# Patient Record
Sex: MALE | Race: WHITE | NOT HISPANIC OR LATINO | Employment: OTHER | URBAN - NONMETROPOLITAN AREA
[De-identification: names, ages, dates, MRNs, and addresses within clinical notes are randomized per-mention and may not be internally consistent; named-entity substitution may affect disease eponyms.]

---

## 2017-01-10 ENCOUNTER — OFFICE VISIT (OUTPATIENT)
Dept: FAMILY MEDICINE CLINIC | Facility: CLINIC | Age: 37
End: 2017-01-10

## 2017-01-10 VITALS
HEIGHT: 67 IN | DIASTOLIC BLOOD PRESSURE: 82 MMHG | BODY MASS INDEX: 37.51 KG/M2 | OXYGEN SATURATION: 97 % | HEART RATE: 91 BPM | SYSTOLIC BLOOD PRESSURE: 140 MMHG | WEIGHT: 239 LBS

## 2017-01-10 DIAGNOSIS — B35.6 JOCK ITCH: ICD-10-CM

## 2017-01-10 DIAGNOSIS — F20.9 SCHIZOPHRENIA, UNSPECIFIED TYPE (HCC): Primary | ICD-10-CM

## 2017-01-10 DIAGNOSIS — F32.A CHRONIC DEPRESSION: ICD-10-CM

## 2017-01-10 DIAGNOSIS — I10 ESSENTIAL HYPERTENSION: ICD-10-CM

## 2017-01-10 PROCEDURE — 99214 OFFICE O/P EST MOD 30 MIN: CPT | Performed by: FAMILY MEDICINE

## 2017-01-10 RX ORDER — CLOTRIMAZOLE 1 G/ML
SOLUTION TOPICAL 2 TIMES DAILY
Qty: 60 ML | Refills: 2 | Status: SHIPPED | OUTPATIENT
Start: 2017-01-10 | End: 2017-07-13

## 2017-01-10 NOTE — MR AVS SNAPSHOT
Moshe Duane Nuha   1/10/2017 1:30 PM   Office Visit    Dept Phone:  [phone]   Encounter #:  [id number]    Provider:  Calos Greenberg MD   Department:  [facility] FAMILY MEDICINE                Your Full Care Plan              Today's Medication Changes          These changes are accurate as of: 1/10/17  3:06 PM.  If you have any questions, ask your nurse or doctor.               New Medication(s)Ordered:     clotrimazole 1 % external solution   Commonly known as:  LOTRIMIN   Apply  topically 2 (Two) Times a Day.   Started by:  Calos Greenberg MD            Where to Get Your Medications      These medications were sent to [facility] [address] [phone] [facility] [phone] [address] [address]     Phone:  [phone]     clotrimazole 1 % external solution                  Your Updated Medication List          This list is accurate as of: 1/10/17  3:06 PM.  Always use your most recent med list.                amLODIPine 10 MG tablet   Commonly known as:  NORVASC   TAKE ONE TABLET BY MOUTH EVERY DAY       CELEXA 20 MG tablet   Generic drug:  citalopram       clotrimazole 1 % external solution   Commonly known as:  LOTRIMIN   Apply  topically 2 (Two) Times a Day.       haloperidol 2 MG tablet   Commonly known as:  HALDOL       hydrOXYzine 25 MG tablet   Commonly known as:  ATARAX       KEPPRA 500 MG tablet   Generic drug:  levETIRAcetam       metoprolol tartrate 50 MG tablet   Commonly known as:  LOPRESSOR   TAKE ONE TABLET BY MOUTH TWO TIMES A DAY       nystatin 018896 UNIT/GM ointment   Commonly known as:  MYCOSTATIN       primidone 50 MG tablet   Commonly known as:  MYSOLINE       risperiDONE 4 MG tablet   Commonly known as:  risperDAL       TEGRETOL- MG 12 hr tablet   Generic drug:  carBAMazepine XR       traZODone 50 MG tablet   Commonly known as:  DESYREL               We Performed the  Following     CBC & Differential     Comprehensive Metabolic Panel     Lipid Panel       You Were Diagnosed With        Codes Comments    Schizophrenia, unspecified type    -  Primary ICD-10-CM: F20.9     Chronic depression     ICD-10-CM: F32.9  ICD-9-CM: 311     Essential hypertension     ICD-10-CM: I10  ICD-9-CM: 401.9     Jock itch     ICD-10-CM: L29.8  ICD-9-CM: 698.9       Instructions    Return in 3 months.  Will contact with labs.     Patient Instructions History      Upcoming Appointments     Visit Type Date Time Department    OFFICE VISIT 1/10/2017  1:30 PM [facility] FM FACULTY [facility]    FOLLOW UP 6/8/2017 10:30 AM [facility] SLEEP CENTER [facility]    OFFICE VISIT 8/28/2017  1:15 PM [facility] CARDIOLOGY [facility]      [facility] Signup     Our records indicate that you have an active [employer] account.    You can view your After Visit Summary by going to [employer] and logging in with your [employer] username and password.  If you don't have a [employer] username and password but a parent or guardian has access to your record, the parent or guardian should login with their own [employer] username and password and access your record to view the After Visit Summary.    If you have questions, you can email [email or web address] or call [phone] to talk to our [employer] staff.  Remember, [employer] is NOT to be used for urgent needs.  For medical emergencies, dial 911.               Other Info from Your Visit           Your Appointments     Jun 08, 2017 10:30 AM CDT   Follow Up with SLEEP CLINIC [facility] (--)    [address] [place] [address] [place] [address] [phone]           Arrive 15 minutes prior to appointment.            Aug 28, 2017  1:15 PM CDT   Office Visit with Nick Lao MD PhD   [facility] CARDIOLOGY (--)    [address] [place] [address] [address]  [facility] [address] [place] [id number] [phone]           Arrive 15 minutes prior to appointment.             "  Allergies     Abilify [Aripiprazole]  Confusion, Hallucinations    Depakote [Valproic Acid]  Other (See Comments)    Other reaction(s): TREMORS    Lexapro [Escitalopram]  Other (See Comments)    Other reaction(s): JERKING    Mirtazapine  Other (See Comments)    Other reaction(s): JERKING IN SLEEP    Phenobarbital  Confusion, Hallucinations      Reason for Visit     Insomnia     Hypertension     Depression           Vital Signs     Blood Pressure Pulse Height Weight Oxygen Saturation Body Mass Index    140/82 (BP Location: Right arm, Patient Position: Sitting, Cuff Size: Adult) 91 67\" (170.2 cm) 239 lb (108 kg) 97% 37.43 kg/m2    Smoking Status                   Never Smoker           Problems and Diagnoses Noted     Mood problem    High blood pressure    Mental health problem    Jock itch            "

## 2017-01-10 NOTE — PROGRESS NOTES
Subjective   Nicholas Duane Stovall is a 36 y.o. male.     History of Present Illness The patient presents for check of his chronic medical issues. He is doing well. He is having itching in the genital area.    The following portions of the patient's history were reviewed and updated as appropriate: allergies, current medications, past family history, past medical history, past social history, past surgical history and problem list.    Review of Systems   Constitutional: Negative for fatigue and fever.   Respiratory: Negative for cough, chest tightness and wheezing.    Cardiovascular: Negative for chest pain, palpitations and leg swelling.       Objective   Physical Exam   Constitutional: He is oriented to person, place, and time. He appears well-developed and well-nourished.   HENT:   Head: Normocephalic and atraumatic.   Right Ear: External ear normal.   Left Ear: External ear normal.   Nose: Nose normal.   Mouth/Throat: Oropharynx is clear and moist.   Eyes: EOM are normal. Pupils are equal, round, and reactive to light.   Neck: Normal range of motion.   Cardiovascular: Normal rate, regular rhythm and normal heart sounds.  Exam reveals no gallop and no friction rub.    No murmur heard.  Pulmonary/Chest: Effort normal and breath sounds normal. No respiratory distress. He has no wheezes. He has no rales.   Abdominal: Soft. Bowel sounds are normal. There is no tenderness.   Musculoskeletal: Normal range of motion.   Neurological: He is alert and oriented to person, place, and time.   Skin: Skin is warm and dry.   Some erythema in the creases around the groin.   Psychiatric: He has a normal mood and affect. His behavior is normal. Thought content normal.   Nursing note and vitals reviewed.      Assessment/Plan   Moshe was seen today for insomnia, hypertension and depression.    Diagnoses and all orders for this visit:    Schizophrenia, unspecified type    Chronic depression    Essential hypertension  -     CBC &  Differential  -     Comprehensive Metabolic Panel  -     Lipid Panel    Jock itch    Other orders  -     clotrimazole (LOTRIMIN) 1 % external solution; Apply  topically 2 (Two) Times a Day.          Return in three months.  Will contact with labs.

## 2017-01-13 LAB
ALBUMIN SERPL-MCNC: 3.8 GM/DL (ref 3.4–4.8)
ALP SERPL-CCNC: 86 U/L (ref 38–126)
ALT SERPL-CCNC: 54 U/L (ref 21–72)
ANION GAP SERPL CALCULATED.3IONS-SCNC: 8 MMOL/L (ref 5–15)
AST SERPL-CCNC: 45 U/L (ref 17–59)
BASOPHILS # BLD AUTO: 0.13 X1000/UL (ref 0–0.2)
BASOPHILS NFR BLD AUTO: 2 % (ref 0–2)
BILIRUB SERPL-MCNC: 0.5 MG/DL (ref 0.2–1.3)
BUN SERPL-MCNC: 8 MG/DL (ref 7–21)
CALCIUM SERPL-MCNC: 8.8 MG/DL (ref 8.4–10.2)
CHLORIDE SERPL-SCNC: 103 MMOL/L (ref 95–110)
CHOLEST SERPL-MCNC: 169 MG/DL (ref 0–199)
CO2 SERPL-SCNC: 28 MMOL/L (ref 22–31)
CONV AUTO IG#: 0.03 X1000/UL (ref 0.01–0.02)
CONV AUTO IG%: 0.5 % (ref 0–0.5)
CREAT SERPL-MCNC: 0.8 MG/DL (ref 0.7–1.3)
EOSINOPHIL # BLD AUTO: 0.13 X1000/UL (ref 0–0.7)
EOSINOPHIL NFR BLD AUTO: 2 % (ref 0–7)
ERYTHROCYTE [DISTWIDTH] IN BLOOD: 13.5 % (ref 11.5–14.5)
GLUCOSE SERPL-MCNC: 83 MG/DL (ref 60–100)
GRANULOCYTES # BLD AUTO: 2.63 X1000/UL (ref 2–8.6)
GRANULOCYTES NFR BLD AUTO: 40.9 % (ref 37–80)
HCT VFR BLD CALC: 38.2 % (ref 39–49)
HDLC SERPL-MCNC: 30 MG/DL (ref 60–200)
HGB BLD-MCNC: 13.3 GM/DL (ref 13.7–17.3)
LDLC SERPL CALC-MCNC: 105 MG/DL (ref 0–129)
LYMPHOCYTES # BLD AUTO: 2.97 X1000/UL (ref 0.6–4.2)
LYMPHOCYTES NFR BLD AUTO: 46.3 % (ref 10–50)
MCH RBC QN: 28.2 PG (ref 26–34)
MCHC RBC-ENTMCNC: 34.8 GM/DL (ref 31.5–36.3)
MCV RBC: 80.9 FL (ref 80–98)
MONOCYTES # BLD AUTO: 0.53 X1000/UL (ref 0–0.9)
MONOCYTES NFR BLD AUTO: 8.3 % (ref 0–12)
NRBC BLD AUTO-RTO: 0 % (ref 0–0.2)
NRBC SPEC MANUAL: 0 X1000/UL
PLATELET # BLD: 168 X1000/MM3 (ref 150–450)
PMV BLD: 8.6 FL (ref 8–12)
POTASSIUM SERPL-SCNC: 4.3 MMOL/L (ref 3.5–5.1)
PROT SERPL-MCNC: 6.4 GM/DL (ref 6.3–8.6)
RBC # BLD: 4.72 MEGA/MM3 (ref 4.37–5.74)
SODIUM SERPL-SCNC: 139 MMOL/L (ref 137–145)
TRIGL SERPL-MCNC: 172 MG/DL (ref 20–199)
WBC # BLD: 6.4 X1000/UL (ref 3.2–9.8)

## 2017-02-23 RX ORDER — METOPROLOL TARTRATE 50 MG/1
TABLET, FILM COATED ORAL
Qty: 60 TABLET | Refills: 5 | Status: SHIPPED | OUTPATIENT
Start: 2017-02-23 | End: 2017-09-18 | Stop reason: SDUPTHER

## 2017-04-11 ENCOUNTER — OFFICE VISIT (OUTPATIENT)
Dept: FAMILY MEDICINE CLINIC | Facility: CLINIC | Age: 37
End: 2017-04-11

## 2017-04-11 ENCOUNTER — APPOINTMENT (OUTPATIENT)
Dept: LAB | Facility: HOSPITAL | Age: 37
End: 2017-04-11

## 2017-04-11 VITALS
SYSTOLIC BLOOD PRESSURE: 102 MMHG | HEIGHT: 67 IN | DIASTOLIC BLOOD PRESSURE: 70 MMHG | WEIGHT: 245 LBS | HEART RATE: 72 BPM | BODY MASS INDEX: 38.45 KG/M2 | OXYGEN SATURATION: 96 %

## 2017-04-11 DIAGNOSIS — I10 ESSENTIAL HYPERTENSION: ICD-10-CM

## 2017-04-11 DIAGNOSIS — F20.9 SCHIZOPHRENIA, UNSPECIFIED TYPE (HCC): Primary | ICD-10-CM

## 2017-04-11 DIAGNOSIS — G40.909 NONINTRACTABLE EPILEPSY WITHOUT STATUS EPILEPTICUS, UNSPECIFIED EPILEPSY TYPE (HCC): ICD-10-CM

## 2017-04-11 LAB
ALBUMIN SERPL-MCNC: 4.8 G/DL (ref 3.4–4.8)
ALBUMIN/GLOB SERPL: 2.3 G/DL (ref 1.1–1.8)
ALP SERPL-CCNC: 89 U/L (ref 38–126)
ALT SERPL W P-5'-P-CCNC: 68 U/L (ref 21–72)
ANION GAP SERPL CALCULATED.3IONS-SCNC: 11 MMOL/L (ref 5–15)
AST SERPL-CCNC: 77 U/L (ref 17–59)
BASOPHILS # BLD AUTO: 0.18 10*3/MM3 (ref 0–0.2)
BASOPHILS NFR BLD AUTO: 3.7 % (ref 0–2)
BILIRUB SERPL-MCNC: 0.9 MG/DL (ref 0.2–1.3)
BUN BLD-MCNC: 14 MG/DL (ref 7–21)
BUN/CREAT SERPL: 15.6 (ref 7–25)
CALCIUM SPEC-SCNC: 9 MG/DL (ref 8.4–10.2)
CHLORIDE SERPL-SCNC: 103 MMOL/L (ref 95–110)
CO2 SERPL-SCNC: 25 MMOL/L (ref 22–31)
CREAT BLD-MCNC: 0.9 MG/DL (ref 0.7–1.3)
DEPRECATED RDW RBC AUTO: 51.4 FL (ref 35.1–43.9)
EOSINOPHIL # BLD AUTO: 0.07 10*3/MM3 (ref 0–0.7)
EOSINOPHIL NFR BLD AUTO: 1.4 % (ref 0–7)
ERYTHROCYTE [DISTWIDTH] IN BLOOD BY AUTOMATED COUNT: 16.5 % (ref 11.5–14.5)
GFR SERPL CREATININE-BSD FRML MDRD: 94 ML/MIN/1.73 (ref 70–162)
GLOBULIN UR ELPH-MCNC: 2.1 GM/DL (ref 2.3–3.5)
GLUCOSE BLD-MCNC: 98 MG/DL (ref 60–100)
HCT VFR BLD AUTO: 29.7 % (ref 39–49)
HGB BLD-MCNC: 10.7 G/DL (ref 13.7–17.3)
IMM GRANULOCYTES # BLD: 0.03 10*3/MM3 (ref 0–0.02)
IMM GRANULOCYTES NFR BLD: 0.6 % (ref 0–0.5)
LYMPHOCYTES # BLD AUTO: 2.14 10*3/MM3 (ref 0.6–4.2)
LYMPHOCYTES NFR BLD AUTO: 43.8 % (ref 10–50)
MCH RBC QN AUTO: 31.5 PG (ref 26.5–34)
MCHC RBC AUTO-ENTMCNC: 36 G/DL (ref 31.5–36.3)
MCV RBC AUTO: 87.4 FL (ref 80–98)
MONOCYTES # BLD AUTO: 0.53 10*3/MM3 (ref 0–0.9)
MONOCYTES NFR BLD AUTO: 10.8 % (ref 0–12)
NEUTROPHILS # BLD AUTO: 1.94 10*3/MM3 (ref 2–8.6)
NEUTROPHILS NFR BLD AUTO: 39.7 % (ref 37–80)
PLATELET # BLD AUTO: 195 10*3/MM3 (ref 150–450)
PMV BLD AUTO: 8.4 FL (ref 8–12)
POTASSIUM BLD-SCNC: 4.1 MMOL/L (ref 3.5–5.1)
PROT SERPL-MCNC: 6.9 G/DL (ref 6.3–8.6)
RBC # BLD AUTO: 3.4 10*6/MM3 (ref 4.37–5.74)
SODIUM BLD-SCNC: 139 MMOL/L (ref 137–145)
WBC NRBC COR # BLD: 4.89 10*3/MM3 (ref 3.2–9.8)

## 2017-04-11 PROCEDURE — 85025 COMPLETE CBC W/AUTO DIFF WBC: CPT | Performed by: FAMILY MEDICINE

## 2017-04-11 PROCEDURE — 80053 COMPREHEN METABOLIC PANEL: CPT | Performed by: FAMILY MEDICINE

## 2017-04-11 PROCEDURE — 36415 COLL VENOUS BLD VENIPUNCTURE: CPT | Performed by: FAMILY MEDICINE

## 2017-04-11 PROCEDURE — 99213 OFFICE O/P EST LOW 20 MIN: CPT | Performed by: FAMILY MEDICINE

## 2017-04-11 NOTE — PROGRESS NOTES
Subjective   Nicholas Duane Stovall is a 37 y.o. male.     History of Present Illness   Patient comes in for check of his chronic medical issues.  He is at his baseline.He has no major issues today.    The following portions of the patient's history were reviewed and updated as appropriate: allergies, current medications, past family history, past medical history, past social history, past surgical history and problem list.    Review of Systems   Constitutional: Negative for fatigue and fever.   Respiratory: Negative for cough, chest tightness and wheezing.    Cardiovascular: Negative for chest pain, palpitations and leg swelling.   Neurological: Negative for dizziness.   Psychiatric/Behavioral: Negative for agitation, behavioral problems and confusion.       Objective   Physical Exam   Constitutional: He appears well-developed and well-nourished.   HENT:   Head: Normocephalic and atraumatic.   Right Ear: External ear normal.   Left Ear: External ear normal.   Nose: Nose normal.   Mouth/Throat: Oropharynx is clear and moist.   Eyes: EOM are normal. Pupils are equal, round, and reactive to light.   Cardiovascular: Normal rate, regular rhythm and normal heart sounds.  Exam reveals no gallop and no friction rub.    No murmur heard.  Pulmonary/Chest: Effort normal and breath sounds normal. No respiratory distress. He has no wheezes. He has no rales.   Abdominal: Soft. Bowel sounds are normal. He exhibits no distension. There is no tenderness.   Skin: Skin is warm and dry.   Vitals reviewed.      Assessment/Plan   Moshe was seen today for hypertension, schizophrenia and follow-up.    Diagnoses and all orders for this visit:    Schizophrenia, unspecified type    Nonintractable epilepsy without status epilepticus, unspecified epilepsy type    Essential hypertension  -     CBC & Differential  -     Comprehensive Metabolic Panel      Will contact with labs.  Return to clinic in 3 months.

## 2017-04-21 DIAGNOSIS — D50.9 IRON DEFICIENCY ANEMIA, UNSPECIFIED IRON DEFICIENCY ANEMIA TYPE: Primary | ICD-10-CM

## 2017-05-03 ENCOUNTER — LAB (OUTPATIENT)
Dept: LAB | Facility: HOSPITAL | Age: 37
End: 2017-05-03

## 2017-05-03 DIAGNOSIS — D50.9 IRON DEFICIENCY ANEMIA, UNSPECIFIED IRON DEFICIENCY ANEMIA TYPE: ICD-10-CM

## 2017-05-03 LAB — HEMOCCULT STL QL IA: NEGATIVE

## 2017-05-03 PROCEDURE — 82274 ASSAY TEST FOR BLOOD FECAL: CPT | Performed by: FAMILY MEDICINE

## 2017-06-19 RX ORDER — AMLODIPINE BESYLATE 10 MG/1
TABLET ORAL
Qty: 30 TABLET | Refills: 5 | Status: SHIPPED | OUTPATIENT
Start: 2017-06-19 | End: 2017-12-11 | Stop reason: SDUPTHER

## 2017-07-10 ENCOUNTER — OFFICE VISIT (OUTPATIENT)
Dept: FAMILY MEDICINE CLINIC | Facility: CLINIC | Age: 37
End: 2017-07-10

## 2017-07-10 ENCOUNTER — LAB (OUTPATIENT)
Dept: LAB | Facility: HOSPITAL | Age: 37
End: 2017-07-10

## 2017-07-10 VITALS
DIASTOLIC BLOOD PRESSURE: 88 MMHG | BODY MASS INDEX: 37.35 KG/M2 | HEIGHT: 67 IN | HEART RATE: 68 BPM | WEIGHT: 238 LBS | OXYGEN SATURATION: 98 % | SYSTOLIC BLOOD PRESSURE: 120 MMHG

## 2017-07-10 DIAGNOSIS — I10 ESSENTIAL HYPERTENSION: Primary | ICD-10-CM

## 2017-07-10 DIAGNOSIS — G40.909 NONINTRACTABLE EPILEPSY WITHOUT STATUS EPILEPTICUS, UNSPECIFIED EPILEPSY TYPE (HCC): ICD-10-CM

## 2017-07-10 DIAGNOSIS — F20.9 SCHIZOPHRENIA, UNSPECIFIED TYPE (HCC): ICD-10-CM

## 2017-07-10 DIAGNOSIS — T63.464A WASP STING, UNDETERMINED INTENT, INITIAL ENCOUNTER: ICD-10-CM

## 2017-07-10 LAB
ALBUMIN SERPL-MCNC: 4.6 G/DL (ref 3.4–4.8)
ALBUMIN/GLOB SERPL: 2.1 G/DL (ref 1.1–1.8)
ALP SERPL-CCNC: 93 U/L (ref 38–126)
ALT SERPL W P-5'-P-CCNC: 70 U/L (ref 21–72)
ANION GAP SERPL CALCULATED.3IONS-SCNC: 12 MMOL/L (ref 5–15)
AST SERPL-CCNC: 60 U/L (ref 17–59)
BASOPHILS # BLD AUTO: 0.08 10*3/MM3 (ref 0–0.2)
BASOPHILS NFR BLD AUTO: 1.7 % (ref 0–2)
BILIRUB SERPL-MCNC: 0.3 MG/DL (ref 0.2–1.3)
BUN BLD-MCNC: 11 MG/DL (ref 7–21)
BUN/CREAT SERPL: 13.3 (ref 7–25)
CALCIUM SPEC-SCNC: 8.7 MG/DL (ref 8.4–10.2)
CHLORIDE SERPL-SCNC: 102 MMOL/L (ref 95–110)
CO2 SERPL-SCNC: 26 MMOL/L (ref 22–31)
CREAT BLD-MCNC: 0.83 MG/DL (ref 0.7–1.3)
DEPRECATED RDW RBC AUTO: 38.4 FL (ref 35.1–43.9)
EOSINOPHIL # BLD AUTO: 0.01 10*3/MM3 (ref 0–0.7)
EOSINOPHIL NFR BLD AUTO: 0.2 % (ref 0–7)
ERYTHROCYTE [DISTWIDTH] IN BLOOD BY AUTOMATED COUNT: 13 % (ref 11.5–14.5)
GFR SERPL CREATININE-BSD FRML MDRD: 103 ML/MIN/1.73 (ref 70–162)
GLOBULIN UR ELPH-MCNC: 2.2 GM/DL (ref 2.3–3.5)
GLUCOSE BLD-MCNC: 84 MG/DL (ref 60–100)
HCT VFR BLD AUTO: 37.9 % (ref 39–49)
HGB BLD-MCNC: 13.3 G/DL (ref 13.7–17.3)
IMM GRANULOCYTES # BLD: 0.01 10*3/MM3 (ref 0–0.02)
IMM GRANULOCYTES NFR BLD: 0.2 % (ref 0–0.5)
LYMPHOCYTES # BLD AUTO: 2.38 10*3/MM3 (ref 0.6–4.2)
LYMPHOCYTES NFR BLD AUTO: 49.9 % (ref 10–50)
MCH RBC QN AUTO: 28.6 PG (ref 26.5–34)
MCHC RBC AUTO-ENTMCNC: 35.1 G/DL (ref 31.5–36.3)
MCV RBC AUTO: 81.5 FL (ref 80–98)
MONOCYTES # BLD AUTO: 0.36 10*3/MM3 (ref 0–0.9)
MONOCYTES NFR BLD AUTO: 7.5 % (ref 0–12)
NEUTROPHILS # BLD AUTO: 1.93 10*3/MM3 (ref 2–8.6)
NEUTROPHILS NFR BLD AUTO: 40.5 % (ref 37–80)
PLATELET # BLD AUTO: 150 10*3/MM3 (ref 150–450)
PMV BLD AUTO: 9.2 FL (ref 8–12)
POTASSIUM BLD-SCNC: 4.2 MMOL/L (ref 3.5–5.1)
PROT SERPL-MCNC: 6.8 G/DL (ref 6.3–8.6)
RBC # BLD AUTO: 4.65 10*6/MM3 (ref 4.37–5.74)
SODIUM BLD-SCNC: 140 MMOL/L (ref 137–145)
WBC NRBC COR # BLD: 4.77 10*3/MM3 (ref 3.2–9.8)

## 2017-07-10 PROCEDURE — 90471 IMMUNIZATION ADMIN: CPT | Performed by: FAMILY MEDICINE

## 2017-07-10 PROCEDURE — 85025 COMPLETE CBC W/AUTO DIFF WBC: CPT | Performed by: FAMILY MEDICINE

## 2017-07-10 PROCEDURE — 99214 OFFICE O/P EST MOD 30 MIN: CPT | Performed by: FAMILY MEDICINE

## 2017-07-10 PROCEDURE — 80177 DRUG SCRN QUAN LEVETIRACETAM: CPT | Performed by: FAMILY MEDICINE

## 2017-07-10 PROCEDURE — 36415 COLL VENOUS BLD VENIPUNCTURE: CPT | Performed by: FAMILY MEDICINE

## 2017-07-10 PROCEDURE — 80053 COMPREHEN METABOLIC PANEL: CPT | Performed by: FAMILY MEDICINE

## 2017-07-10 PROCEDURE — 90715 TDAP VACCINE 7 YRS/> IM: CPT | Performed by: FAMILY MEDICINE

## 2017-07-10 PROCEDURE — 80165 DIPROPYLACETIC ACID FREE: CPT | Performed by: FAMILY MEDICINE

## 2017-07-10 RX ORDER — METRONIDAZOLE 7.5 MG/G
1 GEL TOPICAL 2 TIMES DAILY
COMMUNITY
End: 2019-08-14

## 2017-07-10 RX ORDER — MINOCYCLINE HYDROCHLORIDE 100 MG/1
100 CAPSULE ORAL DAILY
COMMUNITY
End: 2021-06-28 | Stop reason: ALTCHOICE

## 2017-07-10 RX ORDER — OXCARBAZEPINE 150 MG/1
150 TABLET, FILM COATED ORAL DAILY
COMMUNITY
End: 2017-10-02 | Stop reason: ALTCHOICE

## 2017-07-10 NOTE — PATIENT INSTRUCTIONS
Tdap given.  Baking soda mixed with water to make a paste applied to bee stings twice daily.  Will contact with labs.  Return to clinic in 3 months.

## 2017-07-10 NOTE — PROGRESS NOTES
Subjective   Nicholas Duane Stovall is a 37 y.o. male.     History of Present Illness no seizures.  The patient comes in for check of his chronic medical issues.  He is doing well.  No seizures.  The following portions of the patient's history were reviewed and updated as appropriate: allergies, current medications, past family history, past medical history, past social history, past surgical history and problem list.    Review of Systems   Constitutional: Negative for fatigue and fever.   Respiratory: Negative for cough and wheezing.    Neurological: Negative for seizures and syncope.       Objective   Physical Exam   Constitutional: He appears well-nourished.   HENT:   Head: Normocephalic and atraumatic.   Right Ear: External ear normal.   Left Ear: External ear normal.   Nose: Nose normal.   Mouth/Throat: Oropharynx is clear and moist.   Eyes: EOM are normal. Pupils are equal, round, and reactive to light.   Cardiovascular: Normal rate, regular rhythm, normal heart sounds and intact distal pulses.  Exam reveals no gallop and no friction rub.    No murmur heard.  Pulmonary/Chest: Effort normal and breath sounds normal. No respiratory distress. He has no wheezes. He has no rales.   Abdominal: Soft. Bowel sounds are normal. He exhibits no distension. There is no tenderness.   Skin:   Area of wasp sting under the scapulae.   Vitals reviewed.      Assessment/Plan   Moshe was seen today for hypertension and seizures.    Diagnoses and all orders for this visit:    Essential hypertension  -     CBC & Differential  -     Comprehensive Metabolic Panel  -     CBC Auto Differential    Schizophrenia, unspecified type    Nonintractable epilepsy without status epilepticus, unspecified epilepsy type  -     Valproic Acid Level, Free; Future  -     Levetiracetam Level (Keppra); Future    Wasp sting, undetermined intent, initial encounter    Other orders  -     Tdap Vaccine Greater Than or Equal To 8yo IM    Baking Soda paste to  back as discussed.  RTC in 3 months.  Will contact with labs.  Tdap as ordered.

## 2017-07-13 ENCOUNTER — OFFICE VISIT (OUTPATIENT)
Dept: SLEEP MEDICINE | Facility: HOSPITAL | Age: 37
End: 2017-07-13

## 2017-07-13 VITALS
HEART RATE: 68 BPM | WEIGHT: 237 LBS | BODY MASS INDEX: 37.2 KG/M2 | OXYGEN SATURATION: 97 % | DIASTOLIC BLOOD PRESSURE: 64 MMHG | HEIGHT: 67 IN | SYSTOLIC BLOOD PRESSURE: 124 MMHG

## 2017-07-13 DIAGNOSIS — G47.30 HYPERSOMNIA WITH SLEEP APNEA: Primary | ICD-10-CM

## 2017-07-13 DIAGNOSIS — G47.10 HYPERSOMNIA WITH SLEEP APNEA: Primary | ICD-10-CM

## 2017-07-13 PROCEDURE — 99213 OFFICE O/P EST LOW 20 MIN: CPT | Performed by: INTERNAL MEDICINE

## 2017-07-13 NOTE — PROGRESS NOTES
Sleep Clinic Follow Up    Date: 7/13/2017  Primary Care Physician: Calos Greenberg MD    CHIEF COMPLAINT: Follow up HEAVEN    Interim History:  Since the last visit on 06/09/2016, patient has remained compliant with CPAP.     PAP Data:  Time frame: 06/09/2016 - 07/12/2017   Compliance 97.7 %  PAP range : 10 cm H2O  Average 90% pressure: 10 cmH2O  Leak: 02:31:43 hours  Average AHI 4.9 events/hr  Mask type: FFM  DME: [facility]      Bed time: 2100  Sleep latency: 10 minutes  Number of times awakens during the night: 0-1  Wake time: 0645  Estimated total sleep time at night: 7 hours  Caffeine intake: 2-3 in the morning  Alcohol intake: none  Nap time: 9-10:30 am, 8:30-9 pm  Driving: not driving  Mask and machine issues: mask leak     He denies abnormal dreams, sleep paralysis, hypnagogic hallucinations, cataplexy symptoms     REVIEW OF SYSTEMS:   Negative for chest pain, fever, chills, SOA, abdominal pain.      Exam:  Vitals:    07/13/17 1500   BP: 124/64   Pulse: 68   SpO2: 97%     Body mass index is 37.12 kg/(m^2).    Gen:  No distress, appears stated age, alert, oriented to person, place, and time  Heent:   NC/AT, PERRLA, EOMI, anicteric sclera, external ears normal, OP clear, Mallamati 4, nares patent  NECK:  Supple, midline trachea  LUNGS: Clear breath sounds bilaterally, nonlabored breathing  CV:  Normal S1/S2  ABD:  Non tender, obese  EXT:  No cyanosis or clubbing    Past Medical History:   Diagnosis Date   • Abnormal electrocardiogram     Abnormal electrocardiogram [ECG] [EKG]     • Abnormal result of cardiovascular function study     Abnormal result of other cardiovascular function study   • Acute bronchitis    • Allergic rhinitis    • Anemia    • Burn of mouth and pharynx    • Chronic depression    • Chronic undifferentiated schizophrenia    • Common cold     Common cold - improving      • Contact dermatitis    • Cough    • Encounter for surgical aftercare following surgery of circulatory system     Encounter for  surgical aftercare following surgery on the circulatory system - VNS Implant 2/16/16   • Epidermoid cyst    • Epilepsy    • Epistaxis    • Essential hypertension    • History of echocardiogram 12/31/2015    Echocardiogram W/ color flow 34729 (1) - Mildly dilated left ventricle with normal function with Ef of 55-60%.Mildly dilated right ventricle with normal function. right ventricular EF of 50%.Diastolic function normal.No significant valvular regurg or stenosis.   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, intractable, with status epilepticus     Localization-related (focal) (partial) idiopathic epilepsy and epileptic syndromes with seizures of localized onset, intractable, without status epilepticus   • Multiple acquired skin tags     Multiple skin tags - Chronically irritated   • Need for immunization against influenza     Needs influenza immunization      • Panic disorder    • Renal function test abnormal     Renal function tests abnormal   • Schizophrenia     Schizophrenia, unspecified   • Sebaceous cyst    • Sleep apnea    • Snoring    • Tinea cruris    • Upper respiratory infection        Current Outpatient Prescriptions:   •  amLODIPine (NORVASC) 10 MG tablet, TAKE ONE TABLET BY MOUTH EVERY DAY, Disp: 30 tablet, Rfl: 5  •  carBAMazepine XR (TEGRETOL-XR) 200 MG 12 hr tablet, Take 200 mg by mouth 2 (two) times a day. Med Name: Tegretol  mg tablet,extended release; Indication: seizures, Disp: , Rfl:   •  citalopram (CELEXA) 20 MG tablet, Take 40 mg by mouth daily. Indication: depression, Disp: , Rfl:   •  haloperidol (HALDOL) 2 MG tablet, Take 2 mg by mouth. One tablet in the AM and two at night, Disp: , Rfl:   •  hydrOXYzine (ATARAX) 25 MG tablet, Take 1 tablet by mouth daily., Disp: , Rfl:   •  levETIRAcetam (KEPPRA) 500 MG tablet, Take  by mouth daily. 6 tablet;  Indication: seizures, Disp: , Rfl:   •  metoprolol tartrate (LOPRESSOR) 50 MG tablet, TAKE ONE TABLET BY MOUTH TWO TIMES A DAY, Disp: 60 tablet, Rfl: 5  •  metroNIDAZOLE (METROGEL) 0.75 % gel, Apply 1 application topically 2 (Two) Times a Day., Disp: , Rfl:   •  minocycline (MINOCIN,DYNACIN) 100 MG capsule, Take 100 mg by mouth Daily., Disp: , Rfl:   •  OXcarbazepine (TRILEPTAL) 150 MG tablet, Take 150 mg by mouth Daily., Disp: , Rfl:   •  primidone (MYSOLINE) 50 MG tablet, Take 3 tablets by mouth 2 (two) times a day., Disp: , Rfl:   •  risperiDONE (RisperDAL) 4 MG tablet, Take 4 mg by mouth 2 (two) times a day. Indication: schizophrenia, Disp: , Rfl:     ASSESSMENT:  1. Obstructive sleep apnea (PSG on 09/17/2010, AHI of 28.5, on CPAP of 8 cm H2O), currently on 10cm H2O, but starting to have more sleepiness. This can be multifactorial. His weight is essentially unchanged since his study in 2010. Will start with autoCPAP 5-20. Has mask leak - needs new supplies and education  2. Epilepsy - had one in March, has about q 4-6 months. Has vagus nerve stimulator.  3. RTC in 3 months        Jass Mendoza MD        CC: Calos Greenberg MD          No ref. provider found

## 2017-07-14 LAB — LEVETIRACETAM SERPL-MCNC: 32.5 UG/ML (ref 10–40)

## 2017-07-16 LAB — VALPROATE FREE SERPL-MCNC: NORMAL UG/ML (ref 6–22)

## 2017-08-28 ENCOUNTER — OFFICE VISIT (OUTPATIENT)
Dept: CARDIOLOGY | Facility: CLINIC | Age: 37
End: 2017-08-28

## 2017-08-28 VITALS
OXYGEN SATURATION: 97 % | BODY MASS INDEX: 37.46 KG/M2 | DIASTOLIC BLOOD PRESSURE: 80 MMHG | HEIGHT: 67 IN | HEART RATE: 62 BPM | SYSTOLIC BLOOD PRESSURE: 134 MMHG | WEIGHT: 238.7 LBS

## 2017-08-28 DIAGNOSIS — Q20.8 ABNORMALITY OF RIGHT VENTRICLE OF HEART: Primary | ICD-10-CM

## 2017-08-28 DIAGNOSIS — I51.7 LVH (LEFT VENTRICULAR HYPERTROPHY): Primary | ICD-10-CM

## 2017-08-28 PROCEDURE — 93000 ELECTROCARDIOGRAM COMPLETE: CPT | Performed by: INTERNAL MEDICINE

## 2017-08-28 PROCEDURE — 99213 OFFICE O/P EST LOW 20 MIN: CPT | Performed by: INTERNAL MEDICINE

## 2017-08-28 NOTE — PROGRESS NOTES
Cardiovascular Medicine   Nick Lao M.D., Ph.D., [facility]      The patient returns to cardiology clinic for follow-up of the following cardiac problems:    PROBLEM LIST:    1.  Seizure disorder  2.  Hypertension  a. Mild LVH  b. Mild LV dilation  3.  Schizophrenia  4. Mild RV dilation  a. RV EF: 50%  5. HEAVEN  a. CPAP compliant      RV Dilation  The patient had an echocardiogram in the past which showed mild RV dilation.  8 second echocardiogram showed no interval dilation.  He returns in follow-up today.  He remains asymptomatic.  He's had no resting, exertional or nocturnal angina.  He's had no dyspnea.  No early abdominal satiety, abdominal ascites or lower extremity edema.      Current Outpatient Prescriptions on File Prior to Visit   Medication Sig Dispense Refill   • amLODIPine (NORVASC) 10 MG tablet TAKE ONE TABLET BY MOUTH EVERY DAY 30 tablet 5   • carBAMazepine XR (TEGRETOL-XR) 200 MG 12 hr tablet Take 200 mg by mouth 2 (two) times a day. Med Name: Tegretol  mg tablet,extended release; Indication: seizures     • citalopram (CELEXA) 20 MG tablet Take 40 mg by mouth daily. Indication: depression     • haloperidol (HALDOL) 2 MG tablet Take 2 mg by mouth. One tablet in the AM and two at night     • hydrOXYzine (ATARAX) 25 MG tablet Take 1 tablet by mouth daily.     • levETIRAcetam (KEPPRA) 500 MG tablet Take  by mouth daily. 6 tablet; Indication: seizures     • metoprolol tartrate (LOPRESSOR) 50 MG tablet TAKE ONE TABLET BY MOUTH TWO TIMES A DAY 60 tablet 5   • metroNIDAZOLE (METROGEL) 0.75 % gel Apply 1 application topically 2 (Two) Times a Day.     • minocycline (MINOCIN,DYNACIN) 100 MG capsule Take 100 mg by mouth Daily.     • OXcarbazepine (TRILEPTAL) 150 MG tablet Take 150 mg by mouth Daily.     • primidone (MYSOLINE) 50 MG tablet Take 3 tablets by mouth 2 (two) times a day.     • risperiDONE (RisperDAL) 4 MG tablet Take 4 mg by mouth 2 (two) times a day. Indication: schizophrenia       No  current facility-administered medications on file prior to visit.      Allergies   Allergen Reactions   • Abilify [Aripiprazole] Confusion and Hallucinations   • Depakote [Valproic Acid] Other (See Comments)     Other reaction(s): TREMORS   • Lexapro [Escitalopram] Other (See Comments)     Other reaction(s): JERKING   • Mirtazapine Other (See Comments)     Other reaction(s): JERKING IN SLEEP   • Phenobarbital Confusion and Hallucinations     Social History     Social History   • Marital status: Single     Spouse name: N/A   • Number of children: N/A   • Years of education: N/A     Occupational History   • Not on file.     Social History Main Topics   • Smoking status: Never Smoker   • Smokeless tobacco: Never Used   • Alcohol use No   • Drug use: No   • Sexual activity: Not on file      Comment: Marital status: Single     Other Topics Concern   • Not on file     Social History Narrative       Physical Exam:  Vitals:    08/28/17 1304   BP: 134/80   Pulse: 62   SpO2: 97%     Body mass index is 37.39 kg/(m^2).    GEN: alert, appears stated age and cooperative  JVP:  6 HJR: Nonsustained.     Carotid:  Upstroke: Brisk.  Volume: Normal.    Carotid Bruit:  None  Subclavian Bruit: Not present.    Chest:  Normal Excursion: Normal.  I:E: Normal  Pulmonary:clear to auscultation, no wheezes, rales or rhonchi, symmetric air entry      Precordium:  No palpable heaves or thrusts. P2 is not palpable.   [place]:  normal size and placement Palpable S4: Not present.   Heart rate: normal     Heart Sounds: S1: normal intensity  S2: normal intensity  S3: Absent      S4: Absent  Opening Snap: Absent.      A2-OS:  N/A  Pericardial rub: None  Ejection click: None      Murmurs: Systolic: Not present  Diastolic: Not present  Extremity: no edema, cyanosis  Trophic changes: None   Pallor on elevation: Absent.    Rubor on dependency: None  Pulses: Right radial artery has 2+ (normal) pulse and Left radial artery has 2+ (normal) pulse    DATA  REVIEWED:   8/2016: Stable RV dilation    Echocardiogram finding December 31, 2015  Left ventricle mildly dilated 58 mm  LVEF 55-60%  Right ventricle mildly dilated  Right ventricle EF 50%  Diastolic function normal    Assessment/Plan      1. Mild RV dilation.  Unclear etiology.  Asymptomatic.   Repeat TTE with stable RV dimensions.   -TTE with agitated saline contrast    2.  Cardiac risk assessment: Low risk  -No current indication for aspirin or statin    3.  Tobacco status: Patient is a nonsmoker.            This document has been electronically signed by Nick Lao MD PhD on August 28, 2017 12:44 PM

## 2017-09-06 LAB
BH CV ECHO MEAS - AO ISTHMUS: 2.9 CM
BH CV ECHO MEAS - AO MAX PG (FULL): 4.8 MMHG
BH CV ECHO MEAS - AO MAX PG: 8.3 MMHG
BH CV ECHO MEAS - AO MEAN PG (FULL): 3 MMHG
BH CV ECHO MEAS - AO MEAN PG: 5 MMHG
BH CV ECHO MEAS - AO V2 MAX: 144 CM/SEC
BH CV ECHO MEAS - AO V2 MEAN: 104 CM/SEC
BH CV ECHO MEAS - AO V2 VTI: 29.5 CM
BH CV ECHO MEAS - ASC AORTA: 3.2 CM
BH CV ECHO MEAS - AVA(I,A): 2.3 CM^2
BH CV ECHO MEAS - AVA(I,D): 2.3 CM^2
BH CV ECHO MEAS - AVA(V,A): 2.2 CM^2
BH CV ECHO MEAS - AVA(V,D): 2.2 CM^2
BH CV ECHO MEAS - EDV(CUBED): 205.4 ML
BH CV ECHO MEAS - EDV(TEICH): 173.2 ML
BH CV ECHO MEAS - EF(CUBED): 66.4 %
BH CV ECHO MEAS - EF(TEICH): 57.1 %
BH CV ECHO MEAS - ESV(CUBED): 68.9 ML
BH CV ECHO MEAS - ESV(TEICH): 74.2 ML
BH CV ECHO MEAS - FS: 30.5 %
BH CV ECHO MEAS - IVS/LVPW: 1.1
BH CV ECHO MEAS - IVSD: 1 CM
BH CV ECHO MEAS - LA DIMENSION: 4.3 CM
BH CV ECHO MEAS - LV IVRT: 0.07 SEC
BH CV ECHO MEAS - LV MASS(C)D: 224.6 GRAMS
BH CV ECHO MEAS - LV MAX PG: 3.5 MMHG
BH CV ECHO MEAS - LV MEAN PG: 2 MMHG
BH CV ECHO MEAS - LV V1 MAX: 93.4 CM/SEC
BH CV ECHO MEAS - LV V1 MEAN: 62.7 CM/SEC
BH CV ECHO MEAS - LV V1 VTI: 19.8 CM
BH CV ECHO MEAS - LVIDD: 5.9 CM
BH CV ECHO MEAS - LVIDS: 4.1 CM
BH CV ECHO MEAS - LVOT AREA (M): 3.5 CM^2
BH CV ECHO MEAS - LVOT AREA: 3.5 CM^2
BH CV ECHO MEAS - LVOT DIAM: 2.1 CM
BH CV ECHO MEAS - LVPWD: 0.9 CM
BH CV ECHO MEAS - MR MAX PG: 67.9 MMHG
BH CV ECHO MEAS - MR MAX VEL: 412 CM/SEC
BH CV ECHO MEAS - MV A MAX VEL: 54 CM/SEC
BH CV ECHO MEAS - MV DEC SLOPE: 611 CM/SEC^2
BH CV ECHO MEAS - MV E MAX VEL: 71.9 CM/SEC
BH CV ECHO MEAS - MV E/A: 1.3
BH CV ECHO MEAS - MV MAX PG: 2.8 MMHG
BH CV ECHO MEAS - MV MEAN PG: 1 MMHG
BH CV ECHO MEAS - MV P1/2T MAX VEL: 85.4 CM/SEC
BH CV ECHO MEAS - MV P1/2T: 40.9 MSEC
BH CV ECHO MEAS - MV V2 MAX: 83.5 CM/SEC
BH CV ECHO MEAS - MV V2 MEAN: 49.5 CM/SEC
BH CV ECHO MEAS - MV V2 VTI: 20 CM
BH CV ECHO MEAS - MVA P1/2T LCG: 2.6 CM^2
BH CV ECHO MEAS - MVA(P1/2T): 5.4 CM^2
BH CV ECHO MEAS - MVA(VTI): 3.4 CM^2
BH CV ECHO MEAS - PA MAX PG: 4.9 MMHG
BH CV ECHO MEAS - PA V2 MAX: 111 CM/SEC
BH CV ECHO MEAS - RAP SYSTOLE: 5 MMHG
BH CV ECHO MEAS - RVDD: 3.2 CM
BH CV ECHO MEAS - RVSP: 19 MMHG
BH CV ECHO MEAS - SV(CUBED): 136.5 ML
BH CV ECHO MEAS - SV(LVOT): 68.6 ML
BH CV ECHO MEAS - SV(TEICH): 99 ML

## 2017-09-13 ENCOUNTER — APPOINTMENT (OUTPATIENT)
Dept: LAB | Facility: HOSPITAL | Age: 37
End: 2017-09-13

## 2017-09-13 ENCOUNTER — TRANSCRIBE ORDERS (OUTPATIENT)
Dept: LAB | Facility: HOSPITAL | Age: 37
End: 2017-09-13

## 2017-09-13 DIAGNOSIS — Z51.81 ENCOUNTER FOR THERAPEUTIC DRUG LEVEL MONITORING: Primary | ICD-10-CM

## 2017-09-13 DIAGNOSIS — G40.119 LOCALIZATION-RELATED (FOCAL) (PARTIAL) SYMPTOMATIC EPILEPSY AND EPILEPTIC SYNDROMES WITH SIMPLE PARTIAL SEIZURES, INTRACTABLE, WITHOUT STATUS EPILEPTICUS (HCC): ICD-10-CM

## 2017-09-13 LAB
ALBUMIN SERPL-MCNC: 4.5 G/DL (ref 3.4–4.8)
ALBUMIN/GLOB SERPL: 2 G/DL (ref 1.1–1.8)
ALP SERPL-CCNC: 75 U/L (ref 38–126)
ALT SERPL W P-5'-P-CCNC: 64 U/L (ref 21–72)
ANION GAP SERPL CALCULATED.3IONS-SCNC: 10 MMOL/L (ref 5–15)
AST SERPL-CCNC: 48 U/L (ref 17–59)
BASOPHILS # BLD AUTO: 0.11 10*3/MM3 (ref 0–0.2)
BASOPHILS NFR BLD AUTO: 2.1 % (ref 0–2)
BILIRUB SERPL-MCNC: 0.5 MG/DL (ref 0.2–1.3)
BUN BLD-MCNC: 9 MG/DL (ref 7–21)
BUN/CREAT SERPL: 11.3 (ref 7–25)
CALCIUM SPEC-SCNC: 9.2 MG/DL (ref 8.4–10.2)
CHLORIDE SERPL-SCNC: 100 MMOL/L (ref 95–110)
CO2 SERPL-SCNC: 29 MMOL/L (ref 22–31)
CREAT BLD-MCNC: 0.8 MG/DL (ref 0.7–1.3)
DEPRECATED RDW RBC AUTO: 40.6 FL (ref 35.1–43.9)
EOSINOPHIL # BLD AUTO: 0.14 10*3/MM3 (ref 0–0.7)
EOSINOPHIL NFR BLD AUTO: 2.6 % (ref 0–7)
ERYTHROCYTE [DISTWIDTH] IN BLOOD BY AUTOMATED COUNT: 13.7 % (ref 11.5–14.5)
GFR SERPL CREATININE-BSD FRML MDRD: 108 ML/MIN/1.73 (ref 70–162)
GLOBULIN UR ELPH-MCNC: 2.2 GM/DL (ref 2.3–3.5)
GLUCOSE BLD-MCNC: 81 MG/DL (ref 60–100)
HCT VFR BLD AUTO: 39.6 % (ref 39–49)
HGB BLD-MCNC: 13.5 G/DL (ref 13.7–17.3)
IMM GRANULOCYTES # BLD: 0.02 10*3/MM3 (ref 0–0.02)
IMM GRANULOCYTES NFR BLD: 0.4 % (ref 0–0.5)
LYMPHOCYTES # BLD AUTO: 2.57 10*3/MM3 (ref 0.6–4.2)
LYMPHOCYTES NFR BLD AUTO: 48.4 % (ref 10–50)
MCH RBC QN AUTO: 27.7 PG (ref 26.5–34)
MCHC RBC AUTO-ENTMCNC: 34.1 G/DL (ref 31.5–36.3)
MCV RBC AUTO: 81.3 FL (ref 80–98)
MONOCYTES # BLD AUTO: 0.42 10*3/MM3 (ref 0–0.9)
MONOCYTES NFR BLD AUTO: 7.9 % (ref 0–12)
NEUTROPHILS # BLD AUTO: 2.05 10*3/MM3 (ref 2–8.6)
NEUTROPHILS NFR BLD AUTO: 38.6 % (ref 37–80)
PLATELET # BLD AUTO: 149 10*3/MM3 (ref 150–450)
PMV BLD AUTO: 8.7 FL (ref 8–12)
POTASSIUM BLD-SCNC: 4.5 MMOL/L (ref 3.5–5.1)
PROT SERPL-MCNC: 6.7 G/DL (ref 6.3–8.6)
RBC # BLD AUTO: 4.87 10*6/MM3 (ref 4.37–5.74)
SODIUM BLD-SCNC: 139 MMOL/L (ref 137–145)
WBC NRBC COR # BLD: 5.31 10*3/MM3 (ref 3.2–9.8)

## 2017-09-13 PROCEDURE — 36415 COLL VENOUS BLD VENIPUNCTURE: CPT | Performed by: PSYCHIATRY & NEUROLOGY

## 2017-09-13 PROCEDURE — 85025 COMPLETE CBC W/AUTO DIFF WBC: CPT | Performed by: PSYCHIATRY & NEUROLOGY

## 2017-09-13 PROCEDURE — 80053 COMPREHEN METABOLIC PANEL: CPT | Performed by: PSYCHIATRY & NEUROLOGY

## 2017-09-13 PROCEDURE — 80183 DRUG SCRN QUANT OXCARBAZEPIN: CPT | Performed by: PSYCHIATRY & NEUROLOGY

## 2017-09-17 LAB — OXCARBAZEPINE: NORMAL UG/ML (ref 10–35)

## 2017-09-18 RX ORDER — METOPROLOL TARTRATE 50 MG/1
TABLET, FILM COATED ORAL
Qty: 60 TABLET | Refills: 5 | Status: SHIPPED | OUTPATIENT
Start: 2017-09-18 | End: 2018-02-13 | Stop reason: SDUPTHER

## 2017-10-02 ENCOUNTER — OFFICE VISIT (OUTPATIENT)
Dept: CARDIOLOGY | Facility: CLINIC | Age: 37
End: 2017-10-02

## 2017-10-02 VITALS
SYSTOLIC BLOOD PRESSURE: 124 MMHG | OXYGEN SATURATION: 97 % | DIASTOLIC BLOOD PRESSURE: 80 MMHG | HEIGHT: 67 IN | WEIGHT: 239.1 LBS | HEART RATE: 69 BPM | BODY MASS INDEX: 37.53 KG/M2

## 2017-10-02 DIAGNOSIS — Q20.8 ABNORMALITY OF RIGHT VENTRICLE OF HEART: ICD-10-CM

## 2017-10-02 DIAGNOSIS — I34.0 NON-RHEUMATIC MITRAL REGURGITATION: Primary | ICD-10-CM

## 2017-10-02 PROCEDURE — 99213 OFFICE O/P EST LOW 20 MIN: CPT | Performed by: INTERNAL MEDICINE

## 2017-10-02 NOTE — PROGRESS NOTES
Cardiovascular Medicine   Nick Lao M.D., Ph.D., [facility]      The patient returns to cardiology clinic for follow-up of the following cardiac problems:    PROBLEM LIST:    1.  Seizure disorder  2.  Hypertension  a. Mild LVH  b. Mild LV dilation  3.  Schizophrenia  4. Mild RV dilation  a. RV EF: 50%  5. HEAVEN  a. CPAP compliant      RV Dilation  The patient had an echocardiogram in the past which showed mild RV dilation.  A second echocardiogram showed no interval dilation.  He returns in follow-up today.  He remains asymptomatic.  He's had no resting, exertional or nocturnal angina.  He's had no dyspnea.  No early abdominal satiety, abdominal ascites or lower extremity edema.  I repeated his echocardiogram with agitated saline contrast.  No evidence of a PFO or ASD.  Right ventricle remained stable in diameter.    Mild MR  Most recent echocardiogram returned with mild MR.  The patient remains asymptomatic.      Current Outpatient Prescriptions on File Prior to Visit   Medication Sig Dispense Refill   • amLODIPine (NORVASC) 10 MG tablet TAKE ONE TABLET BY MOUTH EVERY DAY 30 tablet 5   • carBAMazepine XR (TEGRETOL-XR) 200 MG 12 hr tablet Take 200 mg by mouth 2 (two) times a day. Med Name: Tegretol  mg tablet,extended release; Indication: seizures     • citalopram (CELEXA) 20 MG tablet Take 40 mg by mouth daily. Indication: depression     • haloperidol (HALDOL) 2 MG tablet Take 2 mg by mouth. One tablet in the AM and two at night     • hydrOXYzine (ATARAX) 25 MG tablet Take 1 tablet by mouth daily.     • levETIRAcetam (KEPPRA) 500 MG tablet Take  by mouth daily. 6 tablet; Indication: seizures     • metoprolol tartrate (LOPRESSOR) 50 MG tablet TAKE ONE TABLET BY MOUTH TWO TIMES A DAY 60 tablet 5   • metroNIDAZOLE (METROGEL) 0.75 % gel Apply 1 application topically 2 (Two) Times a Day.     • minocycline (MINOCIN,DYNACIN) 100 MG capsule Take 100 mg by mouth Daily.     • OXcarbazepine (TRILEPTAL) 150 MG tablet  Take 150 mg by mouth Daily.     • primidone (MYSOLINE) 50 MG tablet Take 3 tablets by mouth 2 (two) times a day.     • risperiDONE (RisperDAL) 4 MG tablet Take 4 mg by mouth 2 (two) times a day. Indication: schizophrenia       No current facility-administered medications on file prior to visit.      Allergies   Allergen Reactions   • Abilify [Aripiprazole] Confusion and Hallucinations   • Depakote [Valproic Acid] Other (See Comments)     Other reaction(s): TREMORS   • Lexapro [Escitalopram] Other (See Comments)     Other reaction(s): JERKING   • Mirtazapine Other (See Comments)     Other reaction(s): JERKING IN SLEEP   • Phenobarbital Confusion and Hallucinations     Social History     Social History   • Marital status: Single     Spouse name: N/A   • Number of children: N/A   • Years of education: N/A     Occupational History   • Not on file.     Social History Main Topics   • Smoking status: Never Smoker   • Smokeless tobacco: Never Used   • Alcohol use No   • Drug use: No   • Sexual activity: Not on file      Comment: Marital status: Single     Other Topics Concern   • Not on file     Social History Narrative       Physical Exam:  There were no vitals filed for this visit.  There is no height or weight on file to calculate BMI.    GEN: alert, appears stated age and cooperative  JVP:  4  HJR: Nonsustained.     Pulmonary:clear to auscultation, no wheezes, rales or rhonchi, symmetric air entry      Precordium:  No palpable heaves or thrusts. P2 is not palpable.   [place]:  normal size and placement Palpable S4: Not present.   Heart rate: normal     Heart Sounds: S1: normal intensity  S2: normal intensity  S3: Absent      S4: Absent  Opening Snap: Absent.      A2-OS:  N/A  Pericardial rub: None  Ejection click: None      Murmurs: Systolic: Not present  Diastolic: Not present  Extremity: no edema, cyanosis  Pulses: Right radial artery has 2+ (normal) pulse and Left radial artery has 2+ (normal) pulse    DATA REVIEWED:    · Left Ventricle: Left ventricular systolic function is low normal. Estimated EF appears to be in the range of 51 - 55%  · Left ventricular diastolic function is normal.  · Right Ventricle: Normal right ventricular wall thickness, systolic function and septal motion noted. Right ventricular cavity is mildly dilated.  · RV EF: 50-55%  · No evidence of a patent foramen ovale. No evidence of an atrial septal defect present. Saline test results are negative  · Mild mitral valve regurgitation is present  · No evidence of pulmonary hypertension is present  · There is no evidence of pericardial effusion.    8/2016: Stable RV dilation    Echocardiogram finding December 31, 2015  Left ventricle mildly dilated 58 mm  LVEF 55-60%  Right ventricle mildly dilated  Right ventricle EF 50%  Diastolic function normal    Assessment/Plan      1. Mild RV dilation.  Unclear etiology.  Asymptomatic.   Repeat TTE with stable RV dimensions and no evidence of shunt.  -Reassurance    2. Mild MR. NYHA stage B.  -Annual follow-up for clinical examination  -Will plan on repeat TTE in 2020    3.   Cardiac risk assessment: Low risk  -No current indication for aspirin or statin    4.   Tobacco status: Patient is a nonsmoker.      Follow-up: One year          This document has been electronically signed by Nick Lao MD PhD on October 2, 2017 2:31 PM

## 2017-10-11 ENCOUNTER — OFFICE VISIT (OUTPATIENT)
Dept: SLEEP MEDICINE | Facility: HOSPITAL | Age: 37
End: 2017-10-11

## 2017-10-11 VITALS
HEIGHT: 67 IN | WEIGHT: 238 LBS | DIASTOLIC BLOOD PRESSURE: 60 MMHG | BODY MASS INDEX: 37.35 KG/M2 | SYSTOLIC BLOOD PRESSURE: 110 MMHG

## 2017-10-11 DIAGNOSIS — F51.3 SLEEPWALKING: ICD-10-CM

## 2017-10-11 DIAGNOSIS — G47.33 OBSTRUCTIVE SLEEP APNEA, ADULT: Primary | ICD-10-CM

## 2017-10-11 PROCEDURE — 99213 OFFICE O/P EST LOW 20 MIN: CPT | Performed by: INTERNAL MEDICINE

## 2017-10-11 NOTE — PROGRESS NOTES
Sleep Clinic Follow Up    Date: 10/11/2017  Primary Care Physician: Calos Greenberg MD      Interim History (1/3):  Since the last visit on 07/13/2017, patient was started on autocpap 5-20 in place of his 8cm H2O fixed.     1)  HEAVEN - Has remained compliant with CPAP. He denies machine issues, dry mouth, headaches, pressures intolerance, or non-compliance. He denies abnormal dreams, sleep paralysis, hypnagogic hallucinations, or cataplexy symptoms. He continues with high leak and stomach sleeping. He attributes leak to higher pressures.     PAP Data:  Time frame: 07/20/2017 - 10/10/2017   Compliance 100 %  PAP range : 5-20 cm H2O  Average 90% pressure: 9.8-13.1 cmH2O  Leak: 2 hours 45 minutes   Average AHI 6.8 events/hr  Mask type: FFM  DME:     Bed time: 2100  Sleep latency: 30-45 minutes  Number of times awakens during the night: 2-3  Wake time: 0600, but then falls asleep after breakfast until ~ 1100. He attributes this to his medication  Estimated total sleep time at night: 8-10 hours  Caffeine intake: 4-6 per day  Alcohol intake: none  Nap time: rarely after lunch  Sleepiness with Driving: N/A    [place] - 6    2) Patient denies RLS symptoms.     PMHx, FH, SH reviewed and pertinent changes are: oxcarbamazepine stopped, haldol reduced. Sleepwalking has stopped.     REVIEW OF SYSTEMS:   Negative for chest pain, fever, chills, SOA, abdominal pain. Smoking: none      Exam (6-11/12):    Vitals:    10/11/17 0844   BP: 110/60     Body mass index is 37.28 kg/(m^2).  Gen:  No distress, conversant, pleasant, appears stated age, alert, oriented  Eyes:   Anicteric sclera, moist conjunctiva, no lid lag     PERRLA, EOMI   Heent:   NC/AT    Oropharynx clear, Mallampati 4    Normal hearing  Lungs:  Normal effort, non-labored breathing    Clear to auscultation    CV:  Normal S1/S2, without murmur    No lower extremity edema  ABD:  Soft, normal bowel sounds    Skin:  Normal tone, texture and turgor    Psych:  Appropriate  affect  Neuro:  CN 2-12 intact        Past Medical History:   Diagnosis Date   • Abnormal electrocardiogram     Abnormal electrocardiogram [ECG] [EKG]     • Abnormal result of cardiovascular function study     Abnormal result of other cardiovascular function study   • Acute bronchitis    • Allergic rhinitis    • Anemia    • Burn of mouth and pharynx    • Chronic depression    • Chronic undifferentiated schizophrenia    • Common cold     Common cold - improving      • Contact dermatitis    • Cough    • Encounter for surgical aftercare following surgery of circulatory system     Encounter for surgical aftercare following surgery on the circulatory system - VNS Implant 2/16/16   • Epidermoid cyst    • Epilepsy    • Epistaxis    • Essential hypertension    • History of echocardiogram 12/31/2015    Echocardiogram W/ color flow 93473 (1) - Mildly dilated left ventricle with normal function with Ef of 55-60%.Mildly dilated right ventricle with normal function. right ventricular EF of 50%.Diastolic function normal.No significant valvular regurg or stenosis.   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, intractable, with status epilepticus     Localization-related (focal) (partial) idiopathic epilepsy and epileptic syndromes with seizures of localized onset, intractable, without status epilepticus   • Multiple acquired skin tags     Multiple skin tags - Chronically irritated   • Need for immunization against influenza     Needs influenza immunization      • Panic disorder    • Renal function test abnormal     Renal function tests abnormal   • Schizophrenia     Schizophrenia, unspecified   • Sebaceous cyst    • Sleep apnea    • Snoring    • Tinea cruris    • Upper respiratory infection        Current Outpatient Prescriptions:   •  amLODIPine (NORVASC) 10 MG tablet, TAKE ONE TABLET BY MOUTH EVERY  DAY, Disp: 30 tablet, Rfl: 5  •  carBAMazepine XR (TEGRETOL-XR) 200 MG 12 hr tablet, Take 200 mg by mouth 2 (two) times a day. Med Name: Tegretol  mg tablet,extended release; Indication: seizures, Disp: , Rfl:   •  citalopram (CELEXA) 20 MG tablet, Take 40 mg by mouth daily. Indication: depression, Disp: , Rfl:   •  haloperidol (HALDOL) 2 MG tablet, Take 2 mg by mouth. 3 hs, Disp: , Rfl:   •  hydrOXYzine (ATARAX) 25 MG tablet, Take 1 tablet by mouth daily., Disp: , Rfl:   •  levETIRAcetam (KEPPRA) 500 MG tablet, Take  by mouth daily. 6 tablet; Indication: seizures, Disp: , Rfl:   •  metoprolol tartrate (LOPRESSOR) 50 MG tablet, TAKE ONE TABLET BY MOUTH TWO TIMES A DAY, Disp: 60 tablet, Rfl: 5  •  metroNIDAZOLE (METROGEL) 0.75 % gel, Apply 1 application topically 2 (Two) Times a Day., Disp: , Rfl:   •  minocycline (MINOCIN,DYNACIN) 100 MG capsule, Take 100 mg by mouth Daily., Disp: , Rfl:   •  primidone (MYSOLINE) 50 MG tablet, Take 3 tablets by mouth 2 (two) times a day., Disp: , Rfl:   •  risperiDONE (RisperDAL) 4 MG tablet, Take 4 mg by mouth 2 (two) times a day. Indication: schizophrenia, Disp: , Rfl:       ASSESSMENT / PLAN:     1. Obstructive sleep apnea  1. PSG on 09/17/2010, AHI of 28.5  2. CPAP titration on same day, recommended 8 cm H2O  3. Currently on auto 5-20 cm H2O  4. Schedule sleep education to evaluate pressures and leak  5. Consider positional therapy with a foam wedge to prevent stomach sleeping  6. Suboptimal control  2. Sleepwalking - improved with change in medication  3. Schizophrenia - stable on medication  4. Caffeine excess - recommend reducing caffeine intake over time to no more than (3) caffeine beverages per day, all before 4pm.      Total time 25 min, more than half spent in face to face counseling and coordination of care.     This document has been electronically signed by Jass Mendoza MD on October 11, 2017         CC: Calos Gerenberg MD          No ref. provider found

## 2017-10-12 ENCOUNTER — APPOINTMENT (OUTPATIENT)
Dept: LAB | Facility: HOSPITAL | Age: 37
End: 2017-10-12

## 2017-10-12 ENCOUNTER — OFFICE VISIT (OUTPATIENT)
Dept: FAMILY MEDICINE CLINIC | Facility: CLINIC | Age: 37
End: 2017-10-12

## 2017-10-12 VITALS
HEART RATE: 71 BPM | HEIGHT: 67 IN | DIASTOLIC BLOOD PRESSURE: 82 MMHG | OXYGEN SATURATION: 96 % | BODY MASS INDEX: 37.67 KG/M2 | WEIGHT: 240 LBS | SYSTOLIC BLOOD PRESSURE: 130 MMHG

## 2017-10-12 DIAGNOSIS — F20.9 SCHIZOPHRENIA, UNSPECIFIED TYPE (HCC): ICD-10-CM

## 2017-10-12 DIAGNOSIS — D64.9 ANEMIA, UNSPECIFIED TYPE: ICD-10-CM

## 2017-10-12 DIAGNOSIS — I10 ESSENTIAL HYPERTENSION: Primary | ICD-10-CM

## 2017-10-12 LAB
ALBUMIN SERPL-MCNC: 4.6 G/DL (ref 3.4–4.8)
ALBUMIN/GLOB SERPL: 2.1 G/DL (ref 1.1–1.8)
ALP SERPL-CCNC: 85 U/L (ref 38–126)
ALT SERPL W P-5'-P-CCNC: 73 U/L (ref 21–72)
ANION GAP SERPL CALCULATED.3IONS-SCNC: 12 MMOL/L (ref 5–15)
AST SERPL-CCNC: 49 U/L (ref 17–59)
BASOPHILS # BLD AUTO: 0.09 10*3/MM3 (ref 0–0.2)
BASOPHILS NFR BLD AUTO: 1.8 % (ref 0–2)
BILIRUB SERPL-MCNC: 0.5 MG/DL (ref 0.2–1.3)
BUN BLD-MCNC: 10 MG/DL (ref 7–21)
BUN/CREAT SERPL: 11.6 (ref 7–25)
CALCIUM SPEC-SCNC: 9 MG/DL (ref 8.4–10.2)
CHLORIDE SERPL-SCNC: 99 MMOL/L (ref 95–110)
CO2 SERPL-SCNC: 29 MMOL/L (ref 22–31)
CREAT BLD-MCNC: 0.86 MG/DL (ref 0.7–1.3)
DEPRECATED RDW RBC AUTO: 38.9 FL (ref 35.1–43.9)
EOSINOPHIL # BLD AUTO: 0.08 10*3/MM3 (ref 0–0.7)
EOSINOPHIL NFR BLD AUTO: 1.6 % (ref 0–7)
ERYTHROCYTE [DISTWIDTH] IN BLOOD BY AUTOMATED COUNT: 13.3 % (ref 11.5–14.5)
GFR SERPL CREATININE-BSD FRML MDRD: 99 ML/MIN/1.73 (ref 70–162)
GLOBULIN UR ELPH-MCNC: 2.2 GM/DL (ref 2.3–3.5)
GLUCOSE BLD-MCNC: 83 MG/DL (ref 60–100)
HCT VFR BLD AUTO: 38.8 % (ref 39–49)
HGB BLD-MCNC: 14 G/DL (ref 13.7–17.3)
IMM GRANULOCYTES # BLD: 0.02 10*3/MM3 (ref 0–0.02)
IMM GRANULOCYTES NFR BLD: 0.4 % (ref 0–0.5)
LYMPHOCYTES # BLD AUTO: 2.26 10*3/MM3 (ref 0.6–4.2)
LYMPHOCYTES NFR BLD AUTO: 44.1 % (ref 10–50)
MCH RBC QN AUTO: 28.8 PG (ref 26.5–34)
MCHC RBC AUTO-ENTMCNC: 36.1 G/DL (ref 31.5–36.3)
MCV RBC AUTO: 79.8 FL (ref 80–98)
MONOCYTES # BLD AUTO: 0.35 10*3/MM3 (ref 0–0.9)
MONOCYTES NFR BLD AUTO: 6.8 % (ref 0–12)
NEUTROPHILS # BLD AUTO: 2.32 10*3/MM3 (ref 2–8.6)
NEUTROPHILS NFR BLD AUTO: 45.3 % (ref 37–80)
PLATELET # BLD AUTO: 171 10*3/MM3 (ref 150–450)
PMV BLD AUTO: 8.3 FL (ref 8–12)
POTASSIUM BLD-SCNC: 4 MMOL/L (ref 3.5–5.1)
PROT SERPL-MCNC: 6.8 G/DL (ref 6.3–8.6)
RBC # BLD AUTO: 4.86 10*6/MM3 (ref 4.37–5.74)
SODIUM BLD-SCNC: 140 MMOL/L (ref 137–145)
WBC NRBC COR # BLD: 5.12 10*3/MM3 (ref 3.2–9.8)

## 2017-10-12 PROCEDURE — 90686 IIV4 VACC NO PRSV 0.5 ML IM: CPT | Performed by: FAMILY MEDICINE

## 2017-10-12 PROCEDURE — G0008 ADMIN INFLUENZA VIRUS VAC: HCPCS | Performed by: FAMILY MEDICINE

## 2017-10-12 PROCEDURE — 80053 COMPREHEN METABOLIC PANEL: CPT | Performed by: FAMILY MEDICINE

## 2017-10-12 PROCEDURE — 99214 OFFICE O/P EST MOD 30 MIN: CPT | Performed by: FAMILY MEDICINE

## 2017-10-12 PROCEDURE — 85025 COMPLETE CBC W/AUTO DIFF WBC: CPT | Performed by: FAMILY MEDICINE

## 2017-10-12 PROCEDURE — 36415 COLL VENOUS BLD VENIPUNCTURE: CPT | Performed by: FAMILY MEDICINE

## 2017-10-12 NOTE — PROGRESS NOTES
Subjective   Nicholas Duane Stovall is a 37 y.o. male.     History of Present Illness   The patient comes in for check of his chronic medical issues.  He has been stung by a wasp on his arm 4 times.  He has a rash on his ankles.  He is due for a flu shot today.  The following portions of the patient's history were reviewed and updated as appropriate: allergies, current medications, past family history, past medical history, past social history, past surgical history and problem list.    Review of Systems   Cardiovascular: Negative for chest pain, palpitations and leg swelling.   Skin: Positive for rash.       Objective   Physical Exam   Constitutional: He appears well-developed and well-nourished.   HENT:   Head: Normocephalic and atraumatic.   Right Ear: External ear normal.   Left Ear: External ear normal.   Nose: Nose normal.   Mouth/Throat: Oropharynx is clear and moist.   Eyes: Pupils are equal, round, and reactive to light.   Neck: Normal range of motion.   Cardiovascular: Normal rate, regular rhythm and normal heart sounds.  Exam reveals no gallop and no friction rub.    No murmur heard.  Pulmonary/Chest: Effort normal and breath sounds normal. No respiratory distress. He has no wheezes. He has no rales.   Abdominal: Soft. Bowel sounds are normal.   Neurological: He is alert. He has normal reflexes.   Skin:   Bee sting on left forearm.  Rash on the ankles that is consistent with chiggers.   Vitals reviewed.      Assessment/Plan   Moshe was seen today for hypertension.    Diagnoses and all orders for this visit:    Essential hypertension  -     CBC & Differential  -     Comprehensive Metabolic Panel    Anemia, unspecified type    Schizophrenia, unspecified type    Other orders  -     Flu Vaccine Quad PF 3YR+ (FLUARIX [id number])    Continue with current meds.  Will contact with labs.  Flu shot given.  Return in 3 months.

## 2017-11-02 ENCOUNTER — HOSPITAL ENCOUNTER (OUTPATIENT)
Dept: SLEEP MEDICINE | Facility: HOSPITAL | Age: 37
Discharge: HOME | End: 2017-11-02
Attending: INTERNAL MEDICINE

## 2017-12-11 RX ORDER — AMLODIPINE BESYLATE 10 MG/1
TABLET ORAL
Qty: 30 TABLET | Refills: 5 | Status: SHIPPED | OUTPATIENT
Start: 2017-12-11 | End: 2018-06-11 | Stop reason: SDUPTHER

## 2018-01-04 ENCOUNTER — OFFICE VISIT (OUTPATIENT)
Dept: SLEEP MEDICINE | Facility: HOSPITAL | Age: 38
End: 2018-01-04

## 2018-01-04 VITALS
DIASTOLIC BLOOD PRESSURE: 70 MMHG | WEIGHT: 246 LBS | BODY MASS INDEX: 38.61 KG/M2 | HEIGHT: 67 IN | SYSTOLIC BLOOD PRESSURE: 130 MMHG

## 2018-01-04 DIAGNOSIS — G47.33 OBSTRUCTIVE SLEEP APNEA, ADULT: Primary | ICD-10-CM

## 2018-01-04 PROCEDURE — 99213 OFFICE O/P EST LOW 20 MIN: CPT | Performed by: INTERNAL MEDICINE

## 2018-01-04 NOTE — PROGRESS NOTES
Sleep Clinic Follow Up    Date: 1/4/2018  Primary Care Physician: Calos Greenberg MD      Interim History (1/3):  Since the last visit on 10/11/2017, patient has:      1)  HEAVEN - Has remained compliant with CPAP. He denies machine issues, dry mouth, headaches, pressures intolerance, or non-compliance. He develped ulceration on the bridge of the nose. We discussed gel pads, loosening the mask, sleep education, and [place] style.  He denies abnormal dreams, sleep paralysis, hypnagogic hallucinations, or cataplexy symptoms. He continues with high leak from stomach sleeping. His CPAP pressures have increased over time from (9-13) up to (11-16). His leak remains high, but the residual AHI   Has not increased.     PAP Data:  Time frame: 10/05/2017 - 01/02/2018   Compliance 100 %  PAP range : 5-20 cm H2O  Average 90% pressure: 14.4 cmH2O  Leak: 2.5 hours   Average AHI 6.6 events/hr  Mask type: FFM  DME: [facility]    Bed time: 2100  Sleep latency: 30-45minutes  Number of times awakens during the night: 1-2  Wake time: 0700  Estimated total sleep time at night: 8-9 hours  Caffeine intake: 2 coffee, 1 tea, 1 soda  Alcohol intake: none  Nap time: some in the early morning which he contributes to his bipolar medication  Sleepiness with Driving: N/A    [place] - 7    2) Patient denies RLS symptoms.     PMHx, FH, SH reviewed and pertinent changes are: unchanged from last office visit on 10/11/2017      REVIEW OF SYSTEMS:   Negative for chest pain, fever, chills, SOA, abdominal pain. Smoking: none      Exam (6-11/12):    Vitals:    01/04/18 0940   BP: 130/70     HR - 68  RR - 15    Body mass index is 38.53 kg/(m^2).  Gen:  No distress, conversant, pleasant, appears stated age, alert, oriented, flat facies  Eyes:   Anicteric sclera, moist conjunctiva, no lid lag     PERRLA, EOMI   Heent:   NC/AT    Oropharynx clear, Mallampati 4    normal hearing  Lungs:  Normal effort, non-labored breathing    Clear to auscultation    CV:  Normal  S1/S2, no murmur    no lower extremity edema  ABD:  Soft, normal bowel sounds, obese  Psych:  Appropriate affect  Neuro:  CN 2-12 intact    Past Medical History:   Diagnosis Date   • Abnormal electrocardiogram     Abnormal electrocardiogram [ECG] [EKG]     • Abnormal result of cardiovascular function study     Abnormal result of other cardiovascular function study   • Acute bronchitis    • Allergic rhinitis    • Anemia    • Burn of mouth and pharynx    • Chronic depression    • Chronic undifferentiated schizophrenia    • Common cold     Common cold - improving      • Contact dermatitis    • Cough    • Encounter for surgical aftercare following surgery of circulatory system     Encounter for surgical aftercare following surgery on the circulatory system - VNS Implant 2/16/16   • Epidermoid cyst    • Epilepsy    • Epistaxis    • Essential hypertension    • History of echocardiogram 12/31/2015    Echocardiogram W/ color flow 29915 (1) - Mildly dilated left ventricle with normal function with Ef of 55-60%.Mildly dilated right ventricle with normal function. right ventricular EF of 50%.Diastolic function normal.No significant valvular regurg or stenosis.   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, intractable, with status epilepticus     Localization-related (focal) (partial) idiopathic epilepsy and epileptic syndromes with seizures of localized onset, intractable, without status epilepticus   • Multiple acquired skin tags     Multiple skin tags - Chronically irritated   • Need for immunization against influenza     Needs influenza immunization      • Panic disorder    • Renal function test abnormal     Renal function tests abnormal   • Schizophrenia     Schizophrenia, unspecified   • Sebaceous cyst    • Sleep apnea    • Snoring    • Tinea cruris    • Upper respiratory infection         Current Outpatient Prescriptions:   •  amLODIPine (NORVASC) 10 MG tablet, TAKE ONE TABLET BY MOUTH EVERY DAY, Disp: 30 tablet, Rfl: 5  •  carBAMazepine XR (TEGRETOL-XR) 200 MG 12 hr tablet, Take 200 mg by mouth 2 (two) times a day. Med Name: Tegretol  mg tablet,extended release; Indication: seizures, Disp: , Rfl:   •  citalopram (CELEXA) 20 MG tablet, Take 40 mg by mouth daily. Indication: depression, Disp: , Rfl:   •  haloperidol (HALDOL) 2 MG tablet, Take 2 mg by mouth. 3 hs, Disp: , Rfl:   •  hydrOXYzine (ATARAX) 25 MG tablet, Take 1 tablet by mouth daily., Disp: , Rfl:   •  levETIRAcetam (KEPPRA) 500 MG tablet, Take  by mouth daily. 6 tablet; Indication: seizures, Disp: , Rfl:   •  metoprolol tartrate (LOPRESSOR) 50 MG tablet, TAKE ONE TABLET BY MOUTH TWO TIMES A DAY, Disp: 60 tablet, Rfl: 5  •  metroNIDAZOLE (METROGEL) 0.75 % gel, Apply 1 application topically 2 (Two) Times a Day., Disp: , Rfl:   •  minocycline (MINOCIN,DYNACIN) 100 MG capsule, Take 100 mg by mouth Daily., Disp: , Rfl:   •  primidone (MYSOLINE) 50 MG tablet, Take 3 tablets by mouth 2 (two) times a day., Disp: , Rfl:   •  risperiDONE (RisperDAL) 4 MG tablet, Take 4 mg by mouth 2 (two) times a day. Indication: schizophrenia, Disp: , Rfl:       ASSESSMENT / PLAN:   1. Obstructive sleep apnea  1. PSG on 09/17/2010, AHI of 28.5  2. CPAP titration on same day, recommended 8 cm H2O  3. Currently on auto 5-20 cm H2O, averaging 11- 16  4. Large leak but no AHI acceptable (> 50% reduction compared to pre-treatment)  5. Good compliance  6. Loosen mask  7. Sleep education  2. Sleepwalking - resolved  3. Schizophrenia - stable on medication  4. Caffeine excess - resolved. Now has < 5 beverages per day.      Total time 15 min, more than half spent in face to face counseling and coordination of care. RTC in 1 year or sooner if not better after education.     This document has been electronically signed by Jass Mendoza MD on January 4, 2018          CC: Calos Greenberg MD          No ref. provider found

## 2018-01-10 ENCOUNTER — OFFICE VISIT (OUTPATIENT)
Dept: FAMILY MEDICINE CLINIC | Facility: CLINIC | Age: 38
End: 2018-01-10

## 2018-01-10 VITALS
BODY MASS INDEX: 38.61 KG/M2 | SYSTOLIC BLOOD PRESSURE: 160 MMHG | HEART RATE: 70 BPM | OXYGEN SATURATION: 99 % | HEIGHT: 67 IN | DIASTOLIC BLOOD PRESSURE: 110 MMHG | WEIGHT: 246 LBS

## 2018-01-10 DIAGNOSIS — Z00.00 ENCOUNTER FOR MEDICARE ANNUAL WELLNESS EXAM: Primary | ICD-10-CM

## 2018-01-10 PROCEDURE — G0438 PPPS, INITIAL VISIT: HCPCS | Performed by: FAMILY MEDICINE

## 2018-01-10 NOTE — PROGRESS NOTES
Subjective:     Nicholas Duane Stovall is a 37 y.o. male who presents for Medicare Wellness exam.    The following portions of the patient's history were reviewed and updated as appropriate: allergies, current medications, past family history, past medical history, past social history, past surgical history and problem list.  He is at base line    Preventative:  Over the past 2 weeks, have you felt down, depressed, or hopeless?No   Over the past 2 weeks, have you felt little interest or pleasure in doing things?No  Clinical depression screening refused by patient.No     On osteoporosis therapy?Not Indicated     Past Medical Hx:  Past Medical History:   Diagnosis Date   • Abnormal electrocardiogram     Abnormal electrocardiogram [ECG] [EKG]     • Abnormal result of cardiovascular function study     Abnormal result of other cardiovascular function study   • Acute bronchitis    • Allergic rhinitis    • Anemia    • Burn of mouth and pharynx    • Chronic depression    • Chronic undifferentiated schizophrenia    • Common cold     Common cold - improving      • Contact dermatitis    • Cough    • Encounter for surgical aftercare following surgery of circulatory system     Encounter for surgical aftercare following surgery on the circulatory system - VNS Implant 2/16/16   • Epidermoid cyst    • Epilepsy    • Epistaxis    • Essential hypertension    • History of echocardiogram 12/31/2015    Echocardiogram W/ color flow 45899 (1) - Mildly dilated left ventricle with normal function with Ef of 55-60%.Mildly dilated right ventricle with normal function. right ventricular EF of 50%.Diastolic function normal.No significant valvular regurg or stenosis.   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, intractable, with status epilepticus     Localization-related (focal) (partial) idiopathic epilepsy and  epileptic syndromes with seizures of localized onset, intractable, without status epilepticus   • Multiple acquired skin tags     Multiple skin tags - Chronically irritated   • Need for immunization against influenza     Needs influenza immunization      • Panic disorder    • Renal function test abnormal     Renal function tests abnormal   • Schizophrenia     Schizophrenia, unspecified   • Sebaceous cyst    • Sleep apnea    • Snoring    • Tinea cruris    • Upper respiratory infection        Past Surgical Hx:  Past Surgical History:   Procedure Laterality Date   • EXCISION LESION  05/25/2015    Destruction of Benign Lesion (1-14) 89180 (2) - KRYSTAL OLVERA (General Surgery)    • INJECTION OF MEDICATION  06/20/2015    Kenalog (1) - SAFIA KNIGHT ([facility])    • OTHER SURGICAL HISTORY  02/16/2016    Implant neuroreceiver (1) - Implantation of VNS electrode array & impulse generator [employer] model 106 serial 48389 lead model 303 serial [id number] with excision of cervical lymph nodes.   • WOUND CLOSURE  05/10/2015    Layer Closure of Wound TR-EXT 2.6-7.5 CM 56812 (1) - KRYSTAL OLVERA (General Surgery)        Health Maintenance:  Health Maintenance   Topic Date Due   • MEDICARE ANNUAL WELLNESS  10/13/2016   • TDAP/TD VACCINES (2 - Td) 07/10/2027   • INFLUENZA VACCINE  Completed       Current Meds:    Current Outpatient Prescriptions:   •  amLODIPine (NORVASC) 10 MG tablet, TAKE ONE TABLET BY MOUTH EVERY DAY, Disp: 30 tablet, Rfl: 5  •  carBAMazepine XR (TEGRETOL-XR) 200 MG 12 hr tablet, Take 200 mg by mouth 2 (two) times a day. Med Name: Tegretol  mg tablet,extended release; Indication: seizures, Disp: , Rfl:   •  citalopram (CELEXA) 20 MG tablet, Take 40 mg by mouth daily. Indication: depression, Disp: , Rfl:   •  haloperidol (HALDOL) 2 MG tablet, Take 2 mg by mouth. 3 hs, Disp: , Rfl:   •  hydrOXYzine (ATARAX) 25 MG tablet, Take 1 tablet by mouth daily., Disp: , Rfl:   •  levETIRAcetam (KEPPRA) 500 MG tablet, Take  by mouth daily.  "6 tablet; Indication: seizures, Disp: , Rfl:   •  metoprolol tartrate (LOPRESSOR) 50 MG tablet, TAKE ONE TABLET BY MOUTH TWO TIMES A DAY, Disp: 60 tablet, Rfl: 5  •  metroNIDAZOLE (METROGEL) 0.75 % gel, Apply 1 application topically 2 (Two) Times a Day., Disp: , Rfl:   •  minocycline (MINOCIN,DYNACIN) 100 MG capsule, Take 100 mg by mouth Daily., Disp: , Rfl:   •  primidone (MYSOLINE) 50 MG tablet, Take 3 tablets by mouth 2 (two) times a day., Disp: , Rfl:   •  risperiDONE (RisperDAL) 4 MG tablet, Take 4 mg by mouth 2 (two) times a day. Indication: schizophrenia, Disp: , Rfl:     Allergies:  Abilify [aripiprazole]; Depakote [valproic acid]; Lexapro [escitalopram]; Mirtazapine; and Phenobarbital    Family Hx:  Family History   Problem Relation Age of Onset   • Hyperlipidemia Mother    • Hypertension Mother    • Other Mother      Respiratory disorder   • Rheum arthritis Mother    • Stroke Mother    • No Known Problems Father    • Breast cancer Other    • Diabetes Other    • Heart disease Other    • Seizures Neg Hx         Social History:  Social History     Social History   • Marital status: Single     Spouse name: N/A   • Number of children: N/A   • Years of education: N/A     Occupational History   • Not on file.     Social History Main Topics   • Smoking status: Never Smoker   • Smokeless tobacco: Never Used   • Alcohol use No   • Drug use: No   • Sexual activity: Not on file      Comment: Marital status: Single     Other Topics Concern   • Not on file     Social History Narrative       Review of Systems  Review of Systems    Objective:     BP (!) 160/110 (BP Location: Left arm, Cuff Size: Adult)  Pulse 70  Ht 170.2 cm (67.01\")  Wt 112 kg (246 lb)  SpO2 99%  BMI 38.52 kg/m2  Physical Exam    Lab Review  Results for orders placed or performed in visit on 10/12/17   Comprehensive Metabolic Panel   Result Value Ref Range    Glucose 83 60 - 100 mg/dL    BUN 10 7 - 21 mg/dL    Creatinine 0.86 0.70 - 1.30 mg/dL    " Sodium 140 137 - 145 mmol/L    Potassium 4.0 3.5 - 5.1 mmol/L    Chloride 99 95 - 110 mmol/L    CO2 29.0 22.0 - 31.0 mmol/L    Calcium 9.0 8.4 - 10.2 mg/dL    Total Protein 6.8 6.3 - 8.6 g/dL    Albumin 4.60 3.40 - 4.80 g/dL    ALT (SGPT) 73 (H) 21 - 72 U/L    AST (SGOT) 49 17 - 59 U/L    Alkaline Phosphatase 85 38 - 126 U/L    Total Bilirubin 0.5 0.2 - 1.3 mg/dL    eGFR Non  Amer 99 70 - 162 mL/min/1.73    Globulin 2.2 (L) 2.3 - 3.5 gm/dL    A/G Ratio 2.1 (H) 1.1 - 1.8 g/dL    BUN/Creatinine Ratio 11.6 7.0 - 25.0    Anion Gap 12.0 5.0 - 15.0 mmol/L   CBC Auto Differential   Result Value Ref Range    WBC 5.12 3.20 - 9.80 10*3/mm3    RBC 4.86 4.37 - 5.74 10*6/mm3    Hemoglobin 14.0 13.7 - 17.3 g/dL    Hematocrit 38.8 (L) 39.0 - 49.0 %    MCV 79.8 (L) 80.0 - 98.0 fL    MCH 28.8 26.5 - 34.0 pg    MCHC 36.1 31.5 - 36.3 g/dL    RDW 13.3 11.5 - 14.5 %    RDW-SD 38.9 35.1 - 43.9 fl    MPV 8.3 8.0 - 12.0 fL    Platelets 171 150 - 450 10*3/mm3    Neutrophil % 45.3 37.0 - 80.0 %    Lymphocyte % 44.1 10.0 - 50.0 %    Monocyte % 6.8 0.0 - 12.0 %    Eosinophil % 1.6 0.0 - 7.0 %    Basophil % 1.8 0.0 - 2.0 %    Immature Grans % 0.4 0.0 - 0.5 %    Neutrophils, Absolute 2.32 2.00 - 8.60 10*3/mm3    Lymphocytes, Absolute 2.26 0.60 - 4.20 10*3/mm3    Monocytes, Absolute 0.35 0.00 - 0.90 10*3/mm3    Eosinophils, Absolute 0.08 0.00 - 0.70 10*3/mm3    Basophils, Absolute 0.09 0.00 - 0.20 10*3/mm3    Immature Grans, Absolute 0.02 0.00 - 0.02 10*3/mm3       He is at base line     Assessment:     There are no diagnoses linked to this encounter.    Plan:     I have seen the patient and reviewed his responses. He is at his base line and doing well.

## 2018-01-22 DIAGNOSIS — G47.33 OSA (OBSTRUCTIVE SLEEP APNEA): Primary | ICD-10-CM

## 2018-02-13 RX ORDER — METOPROLOL TARTRATE 50 MG/1
TABLET, FILM COATED ORAL
Qty: 60 TABLET | Refills: 5 | Status: SHIPPED | OUTPATIENT
Start: 2018-02-13 | End: 2018-08-06 | Stop reason: SDUPTHER

## 2018-05-21 ENCOUNTER — TELEPHONE (OUTPATIENT)
Dept: FAMILY MEDICINE CLINIC | Facility: CLINIC | Age: 38
End: 2018-05-21

## 2018-05-21 NOTE — TELEPHONE ENCOUNTER
TRIED TO CALL PATIENT TO LET HIM KNOW WE HAD MADE HIM AN APPT FOR DR. ARZOLA    NO CALL; NO VOICEMAIL SET UP.

## 2018-06-11 RX ORDER — AMLODIPINE BESYLATE 10 MG/1
TABLET ORAL
Qty: 30 TABLET | Refills: 5 | Status: SHIPPED | OUTPATIENT
Start: 2018-06-11 | End: 2018-11-01 | Stop reason: SDUPTHER

## 2018-06-15 ENCOUNTER — OFFICE VISIT (OUTPATIENT)
Dept: FAMILY MEDICINE CLINIC | Facility: CLINIC | Age: 38
End: 2018-06-15

## 2018-06-15 ENCOUNTER — LAB (OUTPATIENT)
Dept: LAB | Facility: HOSPITAL | Age: 38
End: 2018-06-15

## 2018-06-15 VITALS
SYSTOLIC BLOOD PRESSURE: 122 MMHG | DIASTOLIC BLOOD PRESSURE: 80 MMHG | OXYGEN SATURATION: 98 % | HEIGHT: 67 IN | HEART RATE: 71 BPM | BODY MASS INDEX: 36.88 KG/M2 | WEIGHT: 235 LBS

## 2018-06-15 DIAGNOSIS — F20.9 SCHIZOPHRENIA, UNSPECIFIED TYPE (HCC): ICD-10-CM

## 2018-06-15 DIAGNOSIS — I10 ESSENTIAL HYPERTENSION: ICD-10-CM

## 2018-06-15 DIAGNOSIS — D64.9 ANEMIA, UNSPECIFIED TYPE: ICD-10-CM

## 2018-06-15 DIAGNOSIS — G40.909 NONINTRACTABLE EPILEPSY WITHOUT STATUS EPILEPTICUS, UNSPECIFIED EPILEPSY TYPE (HCC): ICD-10-CM

## 2018-06-15 DIAGNOSIS — G40.909 NONINTRACTABLE EPILEPSY WITHOUT STATUS EPILEPTICUS, UNSPECIFIED EPILEPSY TYPE (HCC): Primary | ICD-10-CM

## 2018-06-15 LAB
ALBUMIN SERPL-MCNC: 4.6 G/DL (ref 3.4–4.8)
ALBUMIN/GLOB SERPL: 2.1 G/DL (ref 1.1–1.8)
ALP SERPL-CCNC: 89 U/L (ref 38–126)
ALT SERPL W P-5'-P-CCNC: 83 U/L (ref 21–72)
ANION GAP SERPL CALCULATED.3IONS-SCNC: 10 MMOL/L (ref 5–15)
AST SERPL-CCNC: 62 U/L (ref 17–59)
BASOPHILS # BLD AUTO: 0.09 10*3/MM3 (ref 0–0.2)
BASOPHILS NFR BLD AUTO: 1.6 % (ref 0–2)
BILIRUB SERPL-MCNC: 0.4 MG/DL (ref 0.2–1.3)
BUN BLD-MCNC: 10 MG/DL (ref 7–21)
BUN/CREAT SERPL: 13.2 (ref 7–25)
CALCIUM SPEC-SCNC: 9.3 MG/DL (ref 8.4–10.2)
CHLORIDE SERPL-SCNC: 99 MMOL/L (ref 95–110)
CO2 SERPL-SCNC: 30 MMOL/L (ref 22–31)
CREAT BLD-MCNC: 0.76 MG/DL (ref 0.7–1.3)
DEPRECATED RDW RBC AUTO: 38.7 FL (ref 35.1–43.9)
EOSINOPHIL # BLD AUTO: 0.08 10*3/MM3 (ref 0–0.7)
EOSINOPHIL NFR BLD AUTO: 1.4 % (ref 0–7)
ERYTHROCYTE [DISTWIDTH] IN BLOOD BY AUTOMATED COUNT: 12.9 % (ref 11.5–14.5)
GFR SERPL CREATININE-BSD FRML MDRD: 114 ML/MIN/1.73 (ref 63–147)
GLOBULIN UR ELPH-MCNC: 2.2 GM/DL (ref 2.3–3.5)
GLUCOSE BLD-MCNC: 85 MG/DL (ref 60–100)
HCT VFR BLD AUTO: 38.6 % (ref 39–49)
HGB BLD-MCNC: 13.5 G/DL (ref 13.7–17.3)
IMM GRANULOCYTES # BLD: 0.02 10*3/MM3 (ref 0–0.02)
IMM GRANULOCYTES NFR BLD: 0.4 % (ref 0–0.5)
LYMPHOCYTES # BLD AUTO: 2.26 10*3/MM3 (ref 0.6–4.2)
LYMPHOCYTES NFR BLD AUTO: 40.4 % (ref 10–50)
MCH RBC QN AUTO: 28.6 PG (ref 26.5–34)
MCHC RBC AUTO-ENTMCNC: 35 G/DL (ref 31.5–36.3)
MCV RBC AUTO: 81.8 FL (ref 80–98)
MONOCYTES # BLD AUTO: 0.52 10*3/MM3 (ref 0–0.9)
MONOCYTES NFR BLD AUTO: 9.3 % (ref 0–12)
NEUTROPHILS # BLD AUTO: 2.62 10*3/MM3 (ref 2–8.6)
NEUTROPHILS NFR BLD AUTO: 46.9 % (ref 37–80)
PLATELET # BLD AUTO: 144 10*3/MM3 (ref 150–450)
PMV BLD AUTO: 9 FL (ref 8–12)
POTASSIUM BLD-SCNC: 4.1 MMOL/L (ref 3.5–5.1)
PROT SERPL-MCNC: 6.8 G/DL (ref 6.3–8.6)
RBC # BLD AUTO: 4.72 10*6/MM3 (ref 4.37–5.74)
SODIUM BLD-SCNC: 139 MMOL/L (ref 137–145)
WBC NRBC COR # BLD: 5.59 10*3/MM3 (ref 3.2–9.8)

## 2018-06-15 PROCEDURE — 36415 COLL VENOUS BLD VENIPUNCTURE: CPT

## 2018-06-15 PROCEDURE — 85025 COMPLETE CBC W/AUTO DIFF WBC: CPT

## 2018-06-15 PROCEDURE — 80053 COMPREHEN METABOLIC PANEL: CPT

## 2018-06-15 PROCEDURE — 99213 OFFICE O/P EST LOW 20 MIN: CPT | Performed by: FAMILY MEDICINE

## 2018-06-15 RX ORDER — BUSPIRONE HYDROCHLORIDE 10 MG/1
10 TABLET ORAL 2 TIMES DAILY
COMMUNITY

## 2018-06-15 NOTE — PROGRESS NOTES
Subjective   Nicholas Duane Stovall is a 38 y.o. male.     Hypertension   Pertinent negatives include no chest pain or palpitations.    The patient comes in for check of their chronic medical issues. They are doing well. They have no major medical complaint.He has been working in the yard. He has not had any siezures.      The following portions of the patient's history were reviewed and updated as appropriate: allergies, current medications, past family history, past medical history, past social history, past surgical history and problem list.    Review of Systems   Constitutional: Negative for fatigue and fever.   Respiratory: Negative for cough, chest tightness and stridor.    Cardiovascular: Negative for chest pain, palpitations and leg swelling.   Neurological: Negative for seizures.   Psychiatric/Behavioral: Negative for behavioral problems.       Objective   Physical Exam   Constitutional: He appears well-developed and well-nourished.   HENT:   Head: Normocephalic and atraumatic.   Right Ear: External ear normal.   Left Ear: External ear normal.   Nose: Nose normal.   Mouth/Throat: Oropharynx is clear and moist.   Eyes: Pupils are equal, round, and reactive to light.   Neck: Normal range of motion.   Cardiovascular: Normal rate, regular rhythm and normal heart sounds.  Exam reveals no gallop and no friction rub.    No murmur heard.  Pulmonary/Chest: Effort normal and breath sounds normal. No respiratory distress. He has no wheezes. He has no rales.   Abdominal: Soft. Bowel sounds are normal. He exhibits no distension. There is no tenderness. There is no guarding.   Skin: Skin is warm and dry.   Vitals reviewed.        Assessment/Plan   Moshe was seen today for hypertension and follow-up.    Diagnoses and all orders for this visit:    Nonintractable epilepsy without status epilepticus, unspecified epilepsy type  -     Comprehensive metabolic panel; Future    Anemia, unspecified type  -     CBC w AUTO  Differential; Future    Schizophrenia, unspecified type    Essential hypertension        Return in 3 months.  Will contact with labs.

## 2018-08-07 RX ORDER — METOPROLOL TARTRATE 50 MG/1
TABLET, FILM COATED ORAL
Qty: 60 TABLET | Refills: 5 | Status: SHIPPED | OUTPATIENT
Start: 2018-08-07 | End: 2019-02-02 | Stop reason: SDUPTHER

## 2018-08-10 ENCOUNTER — OFFICE VISIT (OUTPATIENT)
Dept: FAMILY MEDICINE CLINIC | Facility: CLINIC | Age: 38
End: 2018-08-10

## 2018-08-10 ENCOUNTER — LAB (OUTPATIENT)
Dept: LAB | Facility: HOSPITAL | Age: 38
End: 2018-08-10

## 2018-08-10 VITALS
SYSTOLIC BLOOD PRESSURE: 134 MMHG | WEIGHT: 222.25 LBS | HEART RATE: 80 BPM | HEIGHT: 66 IN | BODY MASS INDEX: 35.72 KG/M2 | DIASTOLIC BLOOD PRESSURE: 90 MMHG | OXYGEN SATURATION: 99 %

## 2018-08-10 DIAGNOSIS — I10 ESSENTIAL HYPERTENSION: Primary | ICD-10-CM

## 2018-08-10 DIAGNOSIS — G40.909 NONINTRACTABLE EPILEPSY WITHOUT STATUS EPILEPTICUS, UNSPECIFIED EPILEPSY TYPE (HCC): ICD-10-CM

## 2018-08-10 DIAGNOSIS — I10 ESSENTIAL HYPERTENSION: ICD-10-CM

## 2018-08-10 LAB
ALBUMIN SERPL-MCNC: 4.9 G/DL (ref 3.4–4.8)
ALBUMIN/GLOB SERPL: 2 G/DL (ref 1.1–1.8)
ALP SERPL-CCNC: 91 U/L (ref 38–126)
ALT SERPL W P-5'-P-CCNC: 92 U/L (ref 21–72)
ANION GAP SERPL CALCULATED.3IONS-SCNC: 10 MMOL/L (ref 5–15)
AST SERPL-CCNC: 61 U/L (ref 17–59)
BASOPHILS # BLD AUTO: 0.05 10*3/MM3 (ref 0–0.2)
BASOPHILS NFR BLD AUTO: 0.8 % (ref 0–2)
BILIRUB SERPL-MCNC: 0.4 MG/DL (ref 0.2–1.3)
BUN BLD-MCNC: 10 MG/DL (ref 7–21)
BUN/CREAT SERPL: 11 (ref 7–25)
CALCIUM SPEC-SCNC: 9.2 MG/DL (ref 8.4–10.2)
CHLORIDE SERPL-SCNC: 102 MMOL/L (ref 95–110)
CO2 SERPL-SCNC: 30 MMOL/L (ref 22–31)
CREAT BLD-MCNC: 0.91 MG/DL (ref 0.7–1.3)
DEPRECATED RDW RBC AUTO: 36.9 FL (ref 35.1–43.9)
EOSINOPHIL # BLD AUTO: 0.07 10*3/MM3 (ref 0–0.7)
EOSINOPHIL NFR BLD AUTO: 1.2 % (ref 0–7)
ERYTHROCYTE [DISTWIDTH] IN BLOOD BY AUTOMATED COUNT: 12.6 % (ref 11.5–14.5)
GFR SERPL CREATININE-BSD FRML MDRD: 92 ML/MIN/1.73 (ref 63–147)
GLOBULIN UR ELPH-MCNC: 2.5 GM/DL (ref 2.3–3.5)
GLUCOSE BLD-MCNC: 90 MG/DL (ref 60–100)
HCT VFR BLD AUTO: 40.4 % (ref 39–49)
HGB BLD-MCNC: 14.4 G/DL (ref 13.7–17.3)
IMM GRANULOCYTES # BLD: 0.02 10*3/MM3 (ref 0–0.02)
IMM GRANULOCYTES NFR BLD: 0.3 % (ref 0–0.5)
LYMPHOCYTES # BLD AUTO: 2.54 10*3/MM3 (ref 0.6–4.2)
LYMPHOCYTES NFR BLD AUTO: 43.1 % (ref 10–50)
MCH RBC QN AUTO: 29.1 PG (ref 26.5–34)
MCHC RBC AUTO-ENTMCNC: 35.6 G/DL (ref 31.5–36.3)
MCV RBC AUTO: 81.6 FL (ref 80–98)
MONOCYTES # BLD AUTO: 0.42 10*3/MM3 (ref 0–0.9)
MONOCYTES NFR BLD AUTO: 7.1 % (ref 0–12)
NEUTROPHILS # BLD AUTO: 2.79 10*3/MM3 (ref 2–8.6)
NEUTROPHILS NFR BLD AUTO: 47.5 % (ref 37–80)
PLATELET # BLD AUTO: 140 10*3/MM3 (ref 150–450)
PMV BLD AUTO: 8.6 FL (ref 8–12)
POTASSIUM BLD-SCNC: 3.9 MMOL/L (ref 3.5–5.1)
PROT SERPL-MCNC: 7.4 G/DL (ref 6.3–8.6)
RBC # BLD AUTO: 4.95 10*6/MM3 (ref 4.37–5.74)
SODIUM BLD-SCNC: 142 MMOL/L (ref 137–145)
WBC NRBC COR # BLD: 5.89 10*3/MM3 (ref 3.2–9.8)

## 2018-08-10 PROCEDURE — 99213 OFFICE O/P EST LOW 20 MIN: CPT | Performed by: FAMILY MEDICINE

## 2018-08-10 PROCEDURE — 36415 COLL VENOUS BLD VENIPUNCTURE: CPT

## 2018-08-10 PROCEDURE — 80053 COMPREHEN METABOLIC PANEL: CPT

## 2018-08-10 PROCEDURE — 85025 COMPLETE CBC W/AUTO DIFF WBC: CPT

## 2018-08-10 NOTE — PROGRESS NOTES
Subjective   Nicholas Duane Stovall is a 38 y.o. male. Hypertension and Epilepsy. He is doing well.    History of Present Illness The patient comes in for check of their chronic medical issues which include 30 .     The following portions of the patient's history were reviewed and updated as appropriate: allergies, current medications, past family history, past medical history, past social history, past surgical history and problem list.    Review of Systems   Constitutional: Negative for fatigue and fever.   Respiratory: Negative for cough, chest tightness and stridor.    Cardiovascular: Negative for chest pain, palpitations and leg swelling.       Objective   Physical Exam   Constitutional: He appears well-developed and well-nourished.   HENT:   Head: Normocephalic.   Right Ear: External ear normal.   Left Ear: External ear normal.   Nose: Nose normal.   Eyes: Pupils are equal, round, and reactive to light.   Cardiovascular: Normal rate, regular rhythm and normal heart sounds.  Exam reveals no gallop and no friction rub.    No murmur heard.  Pulmonary/Chest: Effort normal and breath sounds normal. No respiratory distress. He has no wheezes. He has no rales.   Abdominal: Soft. Bowel sounds are normal.   Skin: Skin is warm and dry.   Vitals reviewed.        Assessment/Plan   Moshe was seen today for personal problem.    Diagnoses and all orders for this visit:    Essential hypertension  -     Comprehensive metabolic panel; Future  -     CBC w AUTO Differential; Future    Nonintractable epilepsy without status epilepticus, unspecified epilepsy type (CMS/HCC)      Return to the clinic in 3 months.  Will contact with results as needed.

## 2018-10-02 DIAGNOSIS — I34.0 MITRAL VALVE INSUFFICIENCY, UNSPECIFIED ETIOLOGY: Primary | ICD-10-CM

## 2018-10-03 ENCOUNTER — OFFICE VISIT (OUTPATIENT)
Dept: CARDIOLOGY | Facility: CLINIC | Age: 38
End: 2018-10-03

## 2018-10-03 VITALS
WEIGHT: 221.1 LBS | OXYGEN SATURATION: 100 % | BODY MASS INDEX: 35.53 KG/M2 | DIASTOLIC BLOOD PRESSURE: 72 MMHG | HEART RATE: 63 BPM | SYSTOLIC BLOOD PRESSURE: 120 MMHG | HEIGHT: 66 IN

## 2018-10-03 DIAGNOSIS — Q20.8 ABNORMALITY OF RIGHT VENTRICLE OF HEART: Primary | ICD-10-CM

## 2018-10-03 DIAGNOSIS — E66.09 CLASS 2 OBESITY DUE TO EXCESS CALORIES WITHOUT SERIOUS COMORBIDITY WITH BODY MASS INDEX (BMI) OF 35.0 TO 35.9 IN ADULT: ICD-10-CM

## 2018-10-03 DIAGNOSIS — I34.0 NON-RHEUMATIC MITRAL REGURGITATION: ICD-10-CM

## 2018-10-03 PROCEDURE — 93000 ELECTROCARDIOGRAM COMPLETE: CPT | Performed by: INTERNAL MEDICINE

## 2018-10-03 PROCEDURE — 99214 OFFICE O/P EST MOD 30 MIN: CPT | Performed by: INTERNAL MEDICINE

## 2018-10-03 NOTE — PATIENT INSTRUCTIONS
Mitral Valve Regurgitation  Mitral valve regurgitation, also called mitral regurgitation, is a condition in which blood leaks from the mitral valve in the heart. The mitral valve is located between the upper left chamber (left atrium) and the lower left chamber (left ventricle) of the heart. Normally, this valve opens when the atrium pumps blood into the ventricle, and it closes when the ventricle pumps blood out to the body.  Mitral valve regurgitation happens when the mitral valve does not close properly. As a result, blood in the ventricle leaks back into the atrium. Mitral valve regurgitation causes the heart to work harder to pump blood. If the condition is mild, a person may not have symptoms. However, over time, this can lead to heart failure.  What are the causes?  This condition may be caused by:  · A condition in which the mitral valves do not close completely when the heart pumps blood (mitral valve prolapse).  · Infection, such as endocarditis or rheumatic fever.  · Damage to the mitral valve, such as from injury (trauma) to the heart, a problem present at birth (birth defect), or a heart attack.  · Certain medicines.    What increases the risk?  This condition is more likely to develop in people who have:  · Certain forms of heart disease.  · A family history of heart valve disease.  · Certain conditions that are present at birth (congenital).    You are also more likely to develop this condition if you have taken certain diet pills in the past.  What are the signs or symptoms?  Symptoms of this condition include:  · Shortness of breath with physical activity, like climbing stairs.  · Fast or irregular heartbeat.  · Cough.  · Suddenly waking up at night with difficulty breathing or needing to urinate.  · Heavy breathing.  · Extreme tiredness.  · Swelling in the lower legs, ankles, and feet.    In some cases of mild to moderate mitral regurgitation, there are no symptoms.  How is this diagnosed?  This  condition may be diagnosed based on the results of a physical exam. Your health care provider will listen to your heart for an abnormal heart sound (murmur). You may also have other tests, including:  · An echocardiogram. This test creates ultrasound images of the heart that allow your health care provider to see how the heart valves work while your heart is beating.  · Chest X-ray.  · Electrocardiogram (ECG). This is a test that records the electrical impulses of the heart.  · Cardiac catheterization. This test is used to look at the structure and function of the heart. A thin tube (catheter) is passed through the blood vessels and into the heart. Dye is injected into the blood vessels so the cardiac system can be seen on images that are taken.    How is this treated?  This condition may be treated with:  · Medicines. These may be given to treat symptoms and prevent complications.  · Surgery to repair or replace the mitral valve in severe, long-term (chronic) cases.    Follow these instructions at home:  Lifestyle  · Limit alcohol intake to no more than 1 drink a day for nonpregnant women and 2 drinks a day for men. One drink equals 12 oz of beer, 5 oz of wine, or 1½ oz of hard liquor.  · Do not use any products that contain nicotine or tobacco, such as cigarettes and e-cigarettes. If you need help quitting, ask your health care provider.  · Eat a heart-healthy diet that includes plenty of fresh fruits and vegetables, whole grains, low-fat (lean) protein, and low-fat dairy products. Consider working with a diet and nutrition specialist (dietitian) to help you make healthy food choices.  · Limit the amount of salt (sodium) in your diet. Avoid adding salt to foods, and avoid foods that are high in salt, such as:  ? Pickles.  ? Smoked and cured meats.  ? Processed foods.  · Maintain a healthy weight and stay physically active. Ask your health care provider to recommend activities that are safe for you.  · Try to get 7  or more hours of sleep each night.  · Find ways to manage stress. If you need help with this, ask your health care provider.  General instructions    · Take over-the-counter and prescription medicines only as told by your health care provider.  · Work closely with your health care provider to manage any other health conditions you have, such as diabetes or high blood pressure.  · If you plan to become pregnant, talk with your health care provider first.  · Keep all follow-up visits as told by your health care provider. This is important.  Contact a health care provider if:  · You have a fever.  · You feel more tired than usual when doing physical activity.  · You have a dry cough.  Get help right away if:  · You have shortness of breath.  · You develop chest pain.  · You have swelling in your hands, feet, ankles, or abdomen that is getting worse.  · You have trouble staying awake or you faint.  · You feel dizzy or unsteady.  · You suddenly gain weight.  · You feel confused.  · Any of your symptoms begin to get worse.  These symptoms may represent a serious problem that is an emergency. Do not wait to see if the symptoms will go away. Get medical help right away. Call your local emergency services (911 in the [place] Do not drive yourself to the hospital.  Summary  · Mitral valve regurgitation, also called mitral regurgitation, is a condition in which blood leaks from a valve between two chambers of the heart (mitral valve).  · Depending on how severe your condition is, you may be treated with medicines or surgery.  · Practice heart-healthy habits to manage this condition. These include limiting alcohol, avoiding nicotine and tobacco, and eating a balanced diet that is low in salt (sodium).  This information is not intended to replace advice given to you by your health care provider. Make sure you discuss any questions you have with your health care provider.  Document Released: 03/07/2006 Document Revised: 09/29/2017  Document Reviewed: 09/29/2017  [employer] Patient Education © 2018 [employer]

## 2018-10-03 NOTE — PROGRESS NOTES
Cardiovascular Medicine   Nick Lao M.D., Ph.D., [facility]      The patient returns to cardiovascular clinic for follow-up of the following:    PROBLEM LIST:  1.  RV dilatation without evidence of ASD  2.  Mitral regurgitation  3.  HEAVEN on CPAP    Nicholas Duane Stovall is a 38 y.o. male who returns to clinic today in follow-up.  He returns today in follow for his right ventricular dilatation.  He has had interval 2-D echoes showing no evidence of interval right ventricular dilatation.  No evidence of pulmonary hypertension.  No evidence of ASD.  He continues with stable dyspnea.  He does have HEAVEN for which he is on CPAP.  He denies resting, exertional or nocturnal angina.  His last 2-D TTE showed a mild degree of mitral regurgitation.  He remains asymptomatic.  He denies palpitations.  No dizziness, lightheadedness or syncope.    Review of Systems   Cardiovascular: Positive for dyspnea on exertion. Negative for chest pain, claudication, cyanosis, irregular heartbeat, leg swelling, near-syncope, orthopnea, palpitations, paroxysmal nocturnal dyspnea and syncope.   Respiratory: Positive for shortness of breath. Negative for cough and hemoptysis.      family history includes Breast cancer in his other; Diabetes in his other; Heart disease in his other; Hyperlipidemia in his mother; Hypertension in his mother; No Known Problems in his father; Other in his mother; Rheum arthritis in his mother; Stroke in his mother.   reports that he has never smoked. He has never used smokeless tobacco. He reports that he does not drink alcohol or use drugs.  Allergies   Allergen Reactions   • Abilify [Aripiprazole] Confusion and Hallucinations   • Lexapro [Escitalopram] Other (See Comments)     Other reaction(s): JERKING   • Depakote [Valproic Acid] Other (See Comments)     Other reaction(s): Other (See Comments)  Other reaction(s): TREMORS  Other reaction(s): TREMORS   • Mirtazapine Other (See Comments)     Other reaction(s):  Other (See Comments)  Other reaction(s): JERKING IN SLEEP  Other reaction(s): JERKING IN SLEEP   • Phenobarbital Confusion and Hallucinations     Hyper, Hallucination       Current Outpatient Prescriptions:   •  amLODIPine (NORVASC) 10 MG tablet, TAKE ONE TABLET BY MOUTH EVERY DAY, Disp: 30 tablet, Rfl: 5  •  busPIRone (BUSPAR) 10 MG tablet, Take 10 mg by mouth 2 (Two) Times a Day., Disp: , Rfl:   •  carBAMazepine XR (TEGRETOL-XR) 200 MG 12 hr tablet, Take 200 mg by mouth 2 (two) times a day. Med Name: Tegretol  mg tablet,extended release; Indication: seizures, Disp: , Rfl:   •  citalopram (CELEXA) 20 MG tablet, Take 40 mg by mouth daily. Indication: depression, Disp: , Rfl:   •  haloperidol (HALDOL) 2 MG tablet, Take 2 mg by mouth. 3 hs, Disp: , Rfl:   •  levETIRAcetam (KEPPRA) 500 MG tablet, Take  by mouth Daily. 3 tabs AM, 3 tabs PM; Indication: seizures, Disp: , Rfl:   •  metoprolol tartrate (LOPRESSOR) 50 MG tablet, TAKE ONE TABLET BY MOUTH TWO TIMES A DAY, Disp: 60 tablet, Rfl: 5  •  metroNIDAZOLE (METROGEL) 0.75 % gel, Apply 1 application topically 2 (Two) Times a Day., Disp: , Rfl:   •  minocycline (MINOCIN,DYNACIN) 100 MG capsule, Take 100 mg by mouth Daily., Disp: , Rfl:   •  primidone (MYSOLINE) 50 MG tablet, Take 3 tablets by mouth 2 (Two) Times a Day. 1 tablet in the AM, 2 tabs in the PM, Disp: , Rfl:   •  risperiDONE (RisperDAL) 4 MG tablet, Take 4 mg by mouth 2 (Two) Times a Day. 1/2 tablet in the AM, 1.5 tabs in the PM Indication: schizophrenia, Disp: , Rfl:     Physical Exam:  Vitals:    10/03/18 1310   BP: 120/72   Pulse: 63   SpO2: 100%     Body mass index is 35.69 kg/m².   Last Weights:   Wt Readings from Last 3 Encounters:   08/10/18 101 kg (222 lb 4 oz)   06/15/18 107 kg (235 lb)   01/10/18 112 kg (246 lb)     Pulse Ox: Normal   General: alert, appears stated age and cooperative  Body Habitus: obese  HEENT: Head: Normocephalic, no lesions, without obvious abnormality. No arcus senilis,  xanthelasma or xanthomas.  JVP: 6 cm of water at 45 degrees   Volume/Pulsation: Normal  Heart rate: normal    Heart Rhythm: regular     Heart Sounds: S1: normal intensity  S2: normal intensity  S3: absent   S4: absent  Opening Snap: absent  A2-OS:  absent.   Pericardial rub: absent    Ejection click: None      Murmurs:  absent   Extremity: moves all extremities equally.       DATA REVIEWED:           ECG 12 Lead  Date/Time: 10/3/2018 1:09 PM  Performed by: KADIE JOHNS  Authorized by: KADIE JOHNS   Comparison: compared with previous ECG from 8/28/2017  Similar to previous ECG  Rhythm: sinus rhythm and A-V block  Rate: normal  Conduction: 1st degree  ST Segments: ST segments normal  T Waves: T waves normal  QRS axis: normal  Other: no other findings  Clinical impression: abnormal ECG            TTE/NIKO:  Results for orders placed in visit on 08/28/17   Adult Transthoracic Echo Complete    Narrative · Left Ventricle: Left ventricular systolic function is low normal.   Estimated EF appears to be in the range of 51 - 55%  · Left ventricular diastolic function is normal.  · Right Ventricle: Normal right ventricular wall thickness, systolic   function and septal motion noted. Right ventricular cavity is mildly   dilated.  · RV EF: 50-55%  · No evidence of a patent foramen ovale. No evidence of an atrial septal   defect present. Saline test results are negative  · Mild mitral valve regurgitation is present  · No evidence of pulmonary hypertension is present  · There is no evidence of pericardial effusion.                --------------------------------------------------------------------------------------------------  LABS:   Lab Results   Component Value Date    GLUCOSE 90 08/10/2018    BUN 10 08/10/2018    CREATININE 0.91 08/10/2018    EGFRIFNONA 92 08/10/2018    BCR 11.0 08/10/2018    K 3.9 08/10/2018    CO2 30.0 08/10/2018    CALCIUM 9.2 08/10/2018    ALBUMIN 4.90 (H) 08/10/2018    AST 61 (H)  08/10/2018    ALT 92 (H) 08/10/2018     Lab Results   Component Value Date    WBC 5.89 08/10/2018    HGB 14.4 08/10/2018    HCT 40.4 08/10/2018    MCV 81.6 08/10/2018     (L) 08/10/2018     Lab Results   Component Value Date    CHLPL 169 01/13/2017    TRIG 172 01/13/2017    HDL 30 (L) 01/13/2017     01/13/2017     No results found for: TSH, H3RFUND, F8OSWPL, THYROIDAB  No results found for: CKTOTAL, CKMB, CKMBINDEX, TROPONINI, TROPONINT  No results found for: HGBA1C  No results found for: DDIMER  Lab Results   Component Value Date    ALT 92 (H) 08/10/2018     No results found for: HGBA1C  Lab Results   Component Value Date    CREATININE 0.91 08/10/2018     No results found for: IRON, TIBC, FERRITIN  Lab Results   Component Value Date    INR 0.9 02/09/2016    INR 0.9 12/17/2015    PROTIME 11.9 02/09/2016    PROTIME 12.5 12/17/2015       Assessment/Plan      Diagnosis Plan   1. Abnormality of right ventricle of heart.  Repeat 2-D TTE showed no evidence of ASD or pulmonary hypertension.  No interval dilatation.  I suspect this is from long-standing HEAVEN.   Weight loss, continue CPAP      2. Non-rheumatic mitral regurgitation, ACC stage B.   Clinical surveillance with plans for repeat 2-D TTE 2020    3. Obese · General patient education:  -Weight loss hand-out  -Exercise intervention:   Hand out, increase aerobic activity  -PCP Follow-up                       Return in about 1 year (around 10/3/2019).

## 2018-11-01 RX ORDER — AMLODIPINE BESYLATE 10 MG/1
TABLET ORAL
Qty: 30 TABLET | Refills: 5 | Status: SHIPPED | OUTPATIENT
Start: 2018-11-01 | End: 2019-06-01 | Stop reason: SDUPTHER

## 2018-11-09 ENCOUNTER — OFFICE VISIT (OUTPATIENT)
Dept: FAMILY MEDICINE CLINIC | Facility: CLINIC | Age: 38
End: 2018-11-09

## 2018-11-09 ENCOUNTER — LAB (OUTPATIENT)
Dept: LAB | Facility: HOSPITAL | Age: 38
End: 2018-11-09

## 2018-11-09 VITALS
SYSTOLIC BLOOD PRESSURE: 142 MMHG | WEIGHT: 218 LBS | OXYGEN SATURATION: 98 % | HEIGHT: 66 IN | DIASTOLIC BLOOD PRESSURE: 90 MMHG | HEART RATE: 69 BPM | BODY MASS INDEX: 35.03 KG/M2

## 2018-11-09 DIAGNOSIS — F32.A CHRONIC DEPRESSION: ICD-10-CM

## 2018-11-09 DIAGNOSIS — G40.909 NONINTRACTABLE EPILEPSY WITHOUT STATUS EPILEPTICUS, UNSPECIFIED EPILEPSY TYPE (HCC): ICD-10-CM

## 2018-11-09 DIAGNOSIS — D64.9 ANEMIA, UNSPECIFIED TYPE: ICD-10-CM

## 2018-11-09 DIAGNOSIS — Z23 NEEDS FLU SHOT: Primary | ICD-10-CM

## 2018-11-09 LAB
ALBUMIN SERPL-MCNC: 4.6 G/DL (ref 3.4–4.8)
ALBUMIN/GLOB SERPL: 1.9 G/DL (ref 1.1–1.8)
ALP SERPL-CCNC: 101 U/L (ref 38–126)
ALT SERPL W P-5'-P-CCNC: 62 U/L (ref 21–72)
ANION GAP SERPL CALCULATED.3IONS-SCNC: 8 MMOL/L (ref 5–15)
AST SERPL-CCNC: 50 U/L (ref 17–59)
BASOPHILS # BLD AUTO: 0.06 10*3/MM3 (ref 0–0.2)
BASOPHILS NFR BLD AUTO: 1.1 % (ref 0–2)
BILIRUB SERPL-MCNC: 0.4 MG/DL (ref 0.2–1.3)
BUN BLD-MCNC: 11 MG/DL (ref 7–21)
BUN/CREAT SERPL: 12.8 (ref 7–25)
CALCIUM SPEC-SCNC: 9.3 MG/DL (ref 8.4–10.2)
CHLORIDE SERPL-SCNC: 98 MMOL/L (ref 95–110)
CO2 SERPL-SCNC: 30 MMOL/L (ref 22–31)
CREAT BLD-MCNC: 0.86 MG/DL (ref 0.7–1.3)
DEPRECATED RDW RBC AUTO: 37 FL (ref 35.1–43.9)
EOSINOPHIL # BLD AUTO: 0.03 10*3/MM3 (ref 0–0.7)
EOSINOPHIL NFR BLD AUTO: 0.5 % (ref 0–7)
ERYTHROCYTE [DISTWIDTH] IN BLOOD BY AUTOMATED COUNT: 12.4 % (ref 11.5–14.5)
GFR SERPL CREATININE-BSD FRML MDRD: 98 ML/MIN/1.73 (ref 63–147)
GLOBULIN UR ELPH-MCNC: 2.4 GM/DL (ref 2.3–3.5)
GLUCOSE BLD-MCNC: 91 MG/DL (ref 60–100)
HCT VFR BLD AUTO: 39.3 % (ref 39–49)
HGB BLD-MCNC: 13.9 G/DL (ref 13.7–17.3)
IMM GRANULOCYTES # BLD: 0.01 10*3/MM3 (ref 0–0.02)
IMM GRANULOCYTES NFR BLD: 0.2 % (ref 0–0.5)
LYMPHOCYTES # BLD AUTO: 2.2 10*3/MM3 (ref 0.6–4.2)
LYMPHOCYTES NFR BLD AUTO: 39.5 % (ref 10–50)
MCH RBC QN AUTO: 29 PG (ref 26.5–34)
MCHC RBC AUTO-ENTMCNC: 35.4 G/DL (ref 31.5–36.3)
MCV RBC AUTO: 81.9 FL (ref 80–98)
MONOCYTES # BLD AUTO: 0.44 10*3/MM3 (ref 0–0.9)
MONOCYTES NFR BLD AUTO: 7.9 % (ref 0–12)
NEUTROPHILS # BLD AUTO: 2.83 10*3/MM3 (ref 2–8.6)
NEUTROPHILS NFR BLD AUTO: 50.8 % (ref 37–80)
PLATELET # BLD AUTO: 141 10*3/MM3 (ref 150–450)
PMV BLD AUTO: 8.6 FL (ref 8–12)
POTASSIUM BLD-SCNC: 4.6 MMOL/L (ref 3.5–5.1)
PROT SERPL-MCNC: 7 G/DL (ref 6.3–8.6)
RBC # BLD AUTO: 4.8 10*6/MM3 (ref 4.37–5.74)
SODIUM BLD-SCNC: 136 MMOL/L (ref 137–145)
WBC NRBC COR # BLD: 5.57 10*3/MM3 (ref 3.2–9.8)

## 2018-11-09 PROCEDURE — 80053 COMPREHEN METABOLIC PANEL: CPT

## 2018-11-09 PROCEDURE — G0008 ADMIN INFLUENZA VIRUS VAC: HCPCS | Performed by: FAMILY MEDICINE

## 2018-11-09 PROCEDURE — 90674 CCIIV4 VAC NO PRSV 0.5 ML IM: CPT | Performed by: FAMILY MEDICINE

## 2018-11-09 PROCEDURE — 85025 COMPLETE CBC W/AUTO DIFF WBC: CPT

## 2018-11-09 PROCEDURE — 99214 OFFICE O/P EST MOD 30 MIN: CPT | Performed by: FAMILY MEDICINE

## 2018-11-09 PROCEDURE — 36415 COLL VENOUS BLD VENIPUNCTURE: CPT

## 2018-11-09 RX ORDER — CYCLOBENZAPRINE HCL 5 MG
5 TABLET ORAL 3 TIMES DAILY PRN
Qty: 21 TABLET | Refills: 1 | Status: SHIPPED | OUTPATIENT
Start: 2018-11-09 | End: 2018-11-23 | Stop reason: SDUPTHER

## 2018-11-09 NOTE — PROGRESS NOTES
Subjective   Nicholas Duane Stovall is a 38 y.o. male.   Cc: follow up  History of Present Illness The patient comes in for check of their chronic medical issues which include Hypertension, Epilepsy and Schizophrenia. His back has been bothering him. It hurts in the lower Thoracic area. He has started sleeping in a new bed .       The following portions of the patient's history were reviewed and updated as appropriate: allergies, current medications, past family history, past medical history, past social history, past surgical history and problem list.    Review of Systems   Constitutional: Negative for fatigue and fever.   Respiratory: Negative for cough, chest tightness and stridor.    Cardiovascular: Negative for chest pain, palpitations and leg swelling.   Musculoskeletal: Positive for back pain.       Objective   Physical Exam   Constitutional: He appears well-developed and well-nourished.   HENT:   Head: Normocephalic and atraumatic.   Right Ear: External ear normal.   Left Ear: External ear normal.   Nose: Nose normal.   Mouth/Throat: Oropharynx is clear and moist.   Eyes: Pupils are equal, round, and reactive to light.   Neck: Normal range of motion.   Cardiovascular: Normal rate, regular rhythm and normal heart sounds.  Exam reveals no gallop and no friction rub.    No murmur heard.  Pulmonary/Chest: Effort normal and breath sounds normal. No respiratory distress. He has no wheezes. He has no rales.   Abdominal: Soft. Bowel sounds are normal.   Musculoskeletal:   Back is tender in the lower thoracic area.   Vitals reviewed.        Assessment/Plan   Moshe was seen today for follow-up.    Diagnoses and all orders for this visit:    Needs flu shot  -     Discontinue: influenza vac split quad (FLUZONE,FLUARIX,AFLURIA) injection 0.5 mL; Inject 0.5 mL into the appropriate muscle as directed by prescriber During Hospitalization for Immunization.  -     Flucelvax Quad=>4Years ([id number])    Nonintractable  epilepsy without status epilepticus, unspecified epilepsy type (CMS/HCC)    Anemia, unspecified type  -     CBC w AUTO Differential; Future    Chronic depression  -     Comprehensive metabolic panel; Future    Other orders  -     cyclobenzaprine (FLEXERIL) 5 MG tablet; Take 1 tablet by mouth 3 (Three) Times a Day As Needed for Muscle Spasms.          Return to the clinic in 3 month/s.  Will contact with results as needed.

## 2018-11-12 ENCOUNTER — TELEPHONE (OUTPATIENT)
Dept: FAMILY MEDICINE CLINIC | Facility: CLINIC | Age: 38
End: 2018-11-12

## 2018-11-26 RX ORDER — CYCLOBENZAPRINE HCL 5 MG
TABLET ORAL
Qty: 21 TABLET | Refills: 1 | Status: SHIPPED | OUTPATIENT
Start: 2018-11-26 | End: 2019-01-02 | Stop reason: SDUPTHER

## 2019-01-02 RX ORDER — CYCLOBENZAPRINE HCL 5 MG
TABLET ORAL
Qty: 21 TABLET | Refills: 1 | Status: SHIPPED | OUTPATIENT
Start: 2019-01-02 | End: 2019-02-02 | Stop reason: SDUPTHER

## 2019-01-15 ENCOUNTER — OFFICE VISIT (OUTPATIENT)
Dept: FAMILY MEDICINE CLINIC | Facility: CLINIC | Age: 39
End: 2019-01-15

## 2019-01-15 VITALS
TEMPERATURE: 98.8 F | BODY MASS INDEX: 34.6 KG/M2 | SYSTOLIC BLOOD PRESSURE: 144 MMHG | HEART RATE: 72 BPM | OXYGEN SATURATION: 98 % | WEIGHT: 220.44 LBS | DIASTOLIC BLOOD PRESSURE: 90 MMHG | HEIGHT: 67 IN

## 2019-01-15 DIAGNOSIS — H66.90 ACUTE OTITIS MEDIA, UNSPECIFIED OTITIS MEDIA TYPE: Primary | ICD-10-CM

## 2019-01-15 DIAGNOSIS — H61.22 IMPACTED CERUMEN OF LEFT EAR: ICD-10-CM

## 2019-01-15 PROCEDURE — 99213 OFFICE O/P EST LOW 20 MIN: CPT | Performed by: FAMILY MEDICINE

## 2019-01-15 RX ORDER — HALOPERIDOL 1 MG/1
1 TABLET ORAL
Refills: 2 | COMMUNITY
Start: 2018-12-01 | End: 2019-01-15

## 2019-01-15 RX ORDER — SULFAMETHOXAZOLE AND TRIMETHOPRIM 800; 160 MG/1; MG/1
1 TABLET ORAL 2 TIMES DAILY
Qty: 20 TABLET | Refills: 0 | Status: SHIPPED | OUTPATIENT
Start: 2019-01-15 | End: 2019-04-22

## 2019-01-15 NOTE — PROGRESS NOTES
Subjective   Nicholas Duane Stovall is a 38 y.o. male.    cc;Ear pain  History of Present Illness The patient comes in for evaluation of a right otitis media. He has been treated with Amoxicillin and it is still bothering him. He also feels as if the left ear is clogged up.    The following portions of the patient's history were reviewed and updated as appropriate: allergies, current medications, past family history, past medical history, past social history, past surgical history and problem list.    Review of Systems   Constitutional: Negative for fatigue and fever.   HENT: Positive for ear pain and hearing loss. Negative for ear discharge, rhinorrhea and sore throat.    Respiratory: Negative for cough.    Gastrointestinal: Negative for abdominal pain, diarrhea and vomiting.   Musculoskeletal: Negative for neck pain.   Skin: Negative for rash.       Objective   Physical Exam   Constitutional: He appears well-developed and well-nourished.   HENT:   Head: Atraumatic.   Nose: Nose normal.   Mouth/Throat: Oropharynx is clear and moist.   The right TM is mildly erythematous. His left external ear canal is impacted with cerumen.   Eyes: Pupils are equal, round, and reactive to light.   Neck: Normal range of motion.   Cardiovascular: Normal rate, regular rhythm and normal heart sounds. Exam reveals no gallop and no friction rub.   No murmur heard.  Pulmonary/Chest: Effort normal and breath sounds normal. No stridor. No respiratory distress. He has no wheezes. He has no rales.   Abdominal: Soft. Bowel sounds are normal.   Skin: Skin is warm and dry.   Vitals reviewed.        Assessment/Plan   Moshe was seen today for follow-up and ear fullness.    Diagnoses and all orders for this visit:    Acute otitis media, unspecified otitis media type    Impacted cerumen of left ear    Other orders  -     sulfamethoxazole-trimethoprim (BACTRIM DS) 800-160 MG per tablet; Take 1 tablet by mouth 2 (Two) Times a Day.  -     carbamide  peroxide (DEBROX) 6.5 % otic solution; Administer 5 drops into the left ear 2 (Two) Times a Day.      Return to the clinic on 01/22/2019.

## 2019-01-16 ENCOUNTER — PATIENT MESSAGE (OUTPATIENT)
Dept: FAMILY MEDICINE CLINIC | Facility: CLINIC | Age: 39
End: 2019-01-16

## 2019-01-17 ENCOUNTER — OFFICE VISIT (OUTPATIENT)
Dept: SLEEP MEDICINE | Facility: HOSPITAL | Age: 39
End: 2019-01-17

## 2019-01-17 VITALS
SYSTOLIC BLOOD PRESSURE: 105 MMHG | OXYGEN SATURATION: 97 % | DIASTOLIC BLOOD PRESSURE: 62 MMHG | WEIGHT: 224.6 LBS | BODY MASS INDEX: 35.25 KG/M2 | HEART RATE: 78 BPM | HEIGHT: 67 IN

## 2019-01-17 DIAGNOSIS — G47.33 OBSTRUCTIVE SLEEP APNEA, ADULT: Primary | ICD-10-CM

## 2019-01-17 PROCEDURE — 99213 OFFICE O/P EST LOW 20 MIN: CPT | Performed by: INTERNAL MEDICINE
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
## 2019-01-22 ENCOUNTER — OFFICE VISIT (OUTPATIENT)
Dept: FAMILY MEDICINE CLINIC | Facility: CLINIC | Age: 39
End: 2019-01-22

## 2019-01-22 VITALS
HEART RATE: 88 BPM | OXYGEN SATURATION: 98 % | HEIGHT: 67 IN | WEIGHT: 224.38 LBS | BODY MASS INDEX: 35.22 KG/M2 | DIASTOLIC BLOOD PRESSURE: 72 MMHG | SYSTOLIC BLOOD PRESSURE: 128 MMHG

## 2019-01-22 DIAGNOSIS — I10 ESSENTIAL HYPERTENSION: ICD-10-CM

## 2019-01-22 DIAGNOSIS — H61.23 BILATERAL IMPACTED CERUMEN: Primary | ICD-10-CM

## 2019-01-22 PROCEDURE — 99213 OFFICE O/P EST LOW 20 MIN: CPT | Performed by: FAMILY MEDICINE

## 2019-01-22 NOTE — PROGRESS NOTES
Subjective   Nicholas Duane Stovall is a 39 y.o. male.    cc:follow up on Cerumen impaction.  History of Present Illness The patient comes in for follow up of cerumen impaction., He has been using Debrox and his aunt irrigated his ears.    The following portions of the patient's history were reviewed and updated as appropriate: allergies, current medications, past family history, past medical history, past social history, past surgical history and problem list.    Review of Systems   Constitutional: Negative for fatigue and fever.   Respiratory: Negative for cough, chest tightness and stridor.    Cardiovascular: Negative for chest pain, palpitations and leg swelling.       Objective   Physical Exam   Constitutional: He appears well-developed and well-nourished.   HENT:   Head: Normocephalic and atraumatic.   Right Ear: External ear normal.   Left Ear: External ear normal.   The canals are clear.   Eyes: Pupils are equal, round, and reactive to light.   Neck: Normal range of motion.   Cardiovascular: Normal rate, regular rhythm and normal heart sounds. Exam reveals no gallop and no friction rub.   No murmur heard.  Pulmonary/Chest: Effort normal and breath sounds normal. No stridor. No respiratory distress. He has no wheezes.   Abdominal: Soft. Bowel sounds are normal. He exhibits no distension. There is no tenderness.   Vitals reviewed.        Assessment/Plan   Moshe was seen today for cerumen impaction.    Diagnoses and all orders for this visit:    Bilateral impacted cerumen    Essential hypertension  -     Comprehensive metabolic panel; Future  -     CBC w AUTO Differential; Future          Return to the clinic in 2 month/s.  Will contact with results as needed.

## 2019-02-04 RX ORDER — CYCLOBENZAPRINE HCL 5 MG
TABLET ORAL
Qty: 21 TABLET | Refills: 1 | Status: SHIPPED | OUTPATIENT
Start: 2019-02-04 | End: 2019-03-04 | Stop reason: SDUPTHER

## 2019-02-04 RX ORDER — METOPROLOL TARTRATE 50 MG/1
TABLET, FILM COATED ORAL
Qty: 60 TABLET | Refills: 5 | Status: SHIPPED | OUTPATIENT
Start: 2019-02-04 | End: 2019-08-01 | Stop reason: SDUPTHER

## 2019-03-04 RX ORDER — CYCLOBENZAPRINE HCL 5 MG
TABLET ORAL
Qty: 21 TABLET | Refills: 1 | Status: SHIPPED | OUTPATIENT
Start: 2019-03-04 | End: 2019-05-02 | Stop reason: SDUPTHER

## 2019-04-22 ENCOUNTER — LAB (OUTPATIENT)
Dept: LAB | Facility: HOSPITAL | Age: 39
End: 2019-04-22

## 2019-04-22 ENCOUNTER — OFFICE VISIT (OUTPATIENT)
Dept: FAMILY MEDICINE CLINIC | Facility: CLINIC | Age: 39
End: 2019-04-22

## 2019-04-22 VITALS
SYSTOLIC BLOOD PRESSURE: 148 MMHG | HEIGHT: 67 IN | HEART RATE: 94 BPM | WEIGHT: 217.38 LBS | OXYGEN SATURATION: 100 % | DIASTOLIC BLOOD PRESSURE: 72 MMHG | BODY MASS INDEX: 34.12 KG/M2

## 2019-04-22 DIAGNOSIS — F32.A CHRONIC DEPRESSION: ICD-10-CM

## 2019-04-22 DIAGNOSIS — I10 ESSENTIAL HYPERTENSION: Primary | ICD-10-CM

## 2019-04-22 DIAGNOSIS — G40.909 NONINTRACTABLE EPILEPSY WITHOUT STATUS EPILEPTICUS, UNSPECIFIED EPILEPSY TYPE (HCC): ICD-10-CM

## 2019-04-22 DIAGNOSIS — I10 ESSENTIAL HYPERTENSION: ICD-10-CM

## 2019-04-22 PROCEDURE — 99214 OFFICE O/P EST MOD 30 MIN: CPT | Performed by: FAMILY MEDICINE

## 2019-04-22 PROCEDURE — 80053 COMPREHEN METABOLIC PANEL: CPT

## 2019-04-22 PROCEDURE — 85025 COMPLETE CBC W/AUTO DIFF WBC: CPT

## 2019-04-22 PROCEDURE — 36415 COLL VENOUS BLD VENIPUNCTURE: CPT

## 2019-04-22 RX ORDER — RISPERIDONE 4 MG/1
TABLET ORAL
COMMUNITY

## 2019-04-22 NOTE — PROGRESS NOTES
Subjective   Nicholas Duane Stovall is a 39 y.o. male.    cc: follow up  History of Present Illness The patient comes in for check of their chronic medical issues which include Hypertension, Epilepsy and Chronic Depression. He is at his base line. .       The following portions of the patient's history were reviewed and updated as appropriate: allergies, current medications, past family history, past medical history, past social history, past surgical history and problem list.    Review of Systems   Constitutional: Negative for fatigue and fever.   Respiratory: Negative for cough, chest tightness and stridor.    Cardiovascular: Negative for chest pain, palpitations and leg swelling.       Objective   Physical Exam   Constitutional: He appears well-developed and well-nourished.   HENT:   Head: Normocephalic and atraumatic.   Right Ear: External ear normal.   Left Ear: External ear normal.   Nose: Nose normal.   Mouth/Throat: Oropharynx is clear and moist.   Eyes: Conjunctivae are normal.   Cardiovascular: Normal rate, regular rhythm and normal heart sounds. Exam reveals no gallop and no friction rub.   No murmur heard.  Pulmonary/Chest: Effort normal and breath sounds normal. No stridor. No respiratory distress. He has no wheezes. He has no rales.   Abdominal: Soft. Bowel sounds are normal. He exhibits no distension and no mass. There is no tenderness. There is no guarding.   Neurological: He is alert.   Skin: Skin is warm and dry.   Vitals reviewed.        Assessment/Plan   Moshe was seen today for follow-up.    Diagnoses and all orders for this visit:    Essential hypertension  -     CBC No Differential; Future  -     Comprehensive metabolic panel; Future    Nonintractable epilepsy without status epilepticus, unspecified epilepsy type (CMS/HCC)    Chronic depression        Return to the clinic in 3 month/s.  Will contact with results as needed.

## 2019-04-23 LAB
ALBUMIN SERPL-MCNC: 4.9 G/DL (ref 3.5–5.2)
ALBUMIN/GLOB SERPL: 2.1 G/DL
ALP SERPL-CCNC: 86 U/L (ref 39–117)
ALT SERPL W P-5'-P-CCNC: 43 U/L (ref 1–41)
ANION GAP SERPL CALCULATED.3IONS-SCNC: 13.9 MMOL/L
AST SERPL-CCNC: 27 U/L (ref 1–40)
BASOPHILS # BLD AUTO: 0.07 10*3/MM3 (ref 0–0.2)
BASOPHILS NFR BLD AUTO: 1.4 % (ref 0–1.5)
BILIRUB SERPL-MCNC: 0.2 MG/DL (ref 0.2–1.2)
BUN BLD-MCNC: 11 MG/DL (ref 6–20)
BUN/CREAT SERPL: 13.8 (ref 7–25)
CALCIUM SPEC-SCNC: 9.9 MG/DL (ref 8.6–10.5)
CHLORIDE SERPL-SCNC: 97 MMOL/L (ref 98–107)
CO2 SERPL-SCNC: 28.1 MMOL/L (ref 22–29)
CREAT BLD-MCNC: 0.8 MG/DL (ref 0.76–1.27)
DEPRECATED RDW RBC AUTO: 35.8 FL (ref 37–54)
EOSINOPHIL # BLD AUTO: 0.02 10*3/MM3 (ref 0–0.4)
EOSINOPHIL NFR BLD AUTO: 0.4 % (ref 0.3–6.2)
ERYTHROCYTE [DISTWIDTH] IN BLOOD BY AUTOMATED COUNT: 12.3 % (ref 12.3–15.4)
GFR SERPL CREATININE-BSD FRML MDRD: 107 ML/MIN/1.73
GLOBULIN UR ELPH-MCNC: 2.3 GM/DL
GLUCOSE BLD-MCNC: 132 MG/DL (ref 65–99)
HCT VFR BLD AUTO: 39.8 % (ref 37.5–51)
HGB BLD-MCNC: 13.9 G/DL (ref 13–17.7)
IMM GRANULOCYTES # BLD AUTO: 0.01 10*3/MM3 (ref 0–0.05)
IMM GRANULOCYTES NFR BLD AUTO: 0.2 % (ref 0–0.5)
LYMPHOCYTES # BLD AUTO: 1.94 10*3/MM3 (ref 0.7–3.1)
LYMPHOCYTES NFR BLD AUTO: 39.5 % (ref 19.6–45.3)
MCH RBC QN AUTO: 28.3 PG (ref 26.6–33)
MCHC RBC AUTO-ENTMCNC: 34.9 G/DL (ref 31.5–35.7)
MCV RBC AUTO: 80.9 FL (ref 79–97)
MONOCYTES # BLD AUTO: 0.51 10*3/MM3 (ref 0.1–0.9)
MONOCYTES NFR BLD AUTO: 10.4 % (ref 5–12)
NEUTROPHILS # BLD AUTO: 2.36 10*3/MM3 (ref 1.7–7)
NEUTROPHILS NFR BLD AUTO: 48.1 % (ref 42.7–76)
NRBC BLD AUTO-RTO: 0 /100 WBC (ref 0–0.2)
PLATELET # BLD AUTO: 166 10*3/MM3 (ref 140–450)
PMV BLD AUTO: 9.4 FL (ref 6–12)
POTASSIUM BLD-SCNC: 3.8 MMOL/L (ref 3.5–5.2)
PROT SERPL-MCNC: 7.2 G/DL (ref 6–8.5)
RBC # BLD AUTO: 4.92 10*6/MM3 (ref 4.14–5.8)
SODIUM BLD-SCNC: 139 MMOL/L (ref 136–145)
WBC NRBC COR # BLD: 4.91 10*3/MM3 (ref 3.4–10.8)

## 2019-05-02 RX ORDER — CYCLOBENZAPRINE HCL 5 MG
TABLET ORAL
Qty: 21 TABLET | Refills: 1 | Status: SHIPPED | OUTPATIENT
Start: 2019-05-02 | End: 2019-07-08 | Stop reason: SDUPTHER

## 2019-05-20 RX ORDER — CYCLOBENZAPRINE HCL 5 MG
TABLET ORAL
Qty: 21 TABLET | Refills: 1 | Status: SHIPPED | OUTPATIENT
Start: 2019-05-20 | End: 2019-07-23 | Stop reason: SDUPTHER

## 2019-06-03 RX ORDER — AMLODIPINE BESYLATE 10 MG/1
TABLET ORAL
Qty: 30 TABLET | Refills: 5 | Status: SHIPPED | OUTPATIENT
Start: 2019-06-03 | End: 2019-10-31 | Stop reason: SDUPTHER

## 2019-07-08 RX ORDER — CYCLOBENZAPRINE HCL 5 MG
TABLET ORAL
Qty: 21 TABLET | Refills: 1 | Status: SHIPPED | OUTPATIENT
Start: 2019-07-08 | End: 2019-09-12 | Stop reason: SDUPTHER

## 2019-07-23 ENCOUNTER — LAB (OUTPATIENT)
Dept: LAB | Facility: HOSPITAL | Age: 39
End: 2019-07-23

## 2019-07-23 ENCOUNTER — OFFICE VISIT (OUTPATIENT)
Dept: FAMILY MEDICINE CLINIC | Facility: CLINIC | Age: 39
End: 2019-07-23

## 2019-07-23 VITALS
HEIGHT: 67 IN | BODY MASS INDEX: 34.1 KG/M2 | HEART RATE: 73 BPM | DIASTOLIC BLOOD PRESSURE: 76 MMHG | WEIGHT: 217.25 LBS | SYSTOLIC BLOOD PRESSURE: 120 MMHG | OXYGEN SATURATION: 100 %

## 2019-07-23 DIAGNOSIS — I10 ESSENTIAL HYPERTENSION: ICD-10-CM

## 2019-07-23 DIAGNOSIS — G40.909 NONINTRACTABLE EPILEPSY WITHOUT STATUS EPILEPTICUS, UNSPECIFIED EPILEPSY TYPE (HCC): ICD-10-CM

## 2019-07-23 DIAGNOSIS — I10 ESSENTIAL HYPERTENSION: Primary | ICD-10-CM

## 2019-07-23 LAB
ALBUMIN SERPL-MCNC: 5.2 G/DL (ref 3.5–5.2)
ALBUMIN/GLOB SERPL: 3.3 G/DL
ALP SERPL-CCNC: 99 U/L (ref 39–117)
ALT SERPL W P-5'-P-CCNC: 36 U/L (ref 1–41)
ANION GAP SERPL CALCULATED.3IONS-SCNC: 10.7 MMOL/L (ref 5–15)
AST SERPL-CCNC: 28 U/L (ref 1–40)
BASOPHILS # BLD AUTO: 0.08 10*3/MM3 (ref 0–0.2)
BASOPHILS NFR BLD AUTO: 1.4 % (ref 0–1.5)
BILIRUB SERPL-MCNC: 0.4 MG/DL (ref 0.2–1.2)
BUN BLD-MCNC: 9 MG/DL (ref 6–20)
BUN/CREAT SERPL: 10.2 (ref 7–25)
CALCIUM SPEC-SCNC: 9.6 MG/DL (ref 8.6–10.5)
CHLORIDE SERPL-SCNC: 99 MMOL/L (ref 98–107)
CO2 SERPL-SCNC: 29.3 MMOL/L (ref 22–29)
CREAT BLD-MCNC: 0.88 MG/DL (ref 0.76–1.27)
DEPRECATED RDW RBC AUTO: 37.1 FL (ref 37–54)
EOSINOPHIL # BLD AUTO: 0.05 10*3/MM3 (ref 0–0.4)
EOSINOPHIL NFR BLD AUTO: 0.9 % (ref 0.3–6.2)
ERYTHROCYTE [DISTWIDTH] IN BLOOD BY AUTOMATED COUNT: 12.6 % (ref 12.3–15.4)
GFR SERPL CREATININE-BSD FRML MDRD: 95 ML/MIN/1.73
GLOBULIN UR ELPH-MCNC: 1.6 GM/DL
GLUCOSE BLD-MCNC: 94 MG/DL (ref 65–99)
HCT VFR BLD AUTO: 39.5 % (ref 37.5–51)
HGB BLD-MCNC: 13.6 G/DL (ref 13–17.7)
IMM GRANULOCYTES # BLD AUTO: 0.03 10*3/MM3 (ref 0–0.05)
IMM GRANULOCYTES NFR BLD AUTO: 0.5 % (ref 0–0.5)
LYMPHOCYTES # BLD AUTO: 2.06 10*3/MM3 (ref 0.7–3.1)
LYMPHOCYTES NFR BLD AUTO: 35.6 % (ref 19.6–45.3)
MCH RBC QN AUTO: 28.3 PG (ref 26.6–33)
MCHC RBC AUTO-ENTMCNC: 34.4 G/DL (ref 31.5–35.7)
MCV RBC AUTO: 82.1 FL (ref 79–97)
MONOCYTES # BLD AUTO: 0.5 10*3/MM3 (ref 0.1–0.9)
MONOCYTES NFR BLD AUTO: 8.7 % (ref 5–12)
NEUTROPHILS # BLD AUTO: 3.06 10*3/MM3 (ref 1.7–7)
NEUTROPHILS NFR BLD AUTO: 52.9 % (ref 42.7–76)
NRBC BLD AUTO-RTO: 0 /100 WBC (ref 0–0.2)
PLATELET # BLD AUTO: 168 10*3/MM3 (ref 140–450)
PMV BLD AUTO: 8.9 FL (ref 6–12)
POTASSIUM BLD-SCNC: 4.4 MMOL/L (ref 3.5–5.2)
PROT SERPL-MCNC: 6.8 G/DL (ref 6–8.5)
RBC # BLD AUTO: 4.81 10*6/MM3 (ref 4.14–5.8)
SODIUM BLD-SCNC: 139 MMOL/L (ref 136–145)
WBC NRBC COR # BLD: 5.78 10*3/MM3 (ref 3.4–10.8)

## 2019-07-23 PROCEDURE — 99214 OFFICE O/P EST MOD 30 MIN: CPT | Performed by: FAMILY MEDICINE

## 2019-07-23 PROCEDURE — 80053 COMPREHEN METABOLIC PANEL: CPT

## 2019-07-23 PROCEDURE — 85025 COMPLETE CBC W/AUTO DIFF WBC: CPT

## 2019-07-23 PROCEDURE — 36415 COLL VENOUS BLD VENIPUNCTURE: CPT

## 2019-07-23 NOTE — PROGRESS NOTES
Subjective   Nicholas Duane Stovall is a 39 y.o. male.    cc; Follow up  History of Present Illness The patient comes in for check of their chronic medical issues which include Hypertension and Epilepsy. He is at his base line. .       The following portions of the patient's history were reviewed and updated as appropriate: allergies, current medications, past family history, past medical history, past social history, past surgical history and problem list.    Review of Systems   Constitutional: Negative for fatigue and fever.   Respiratory: Negative for cough, chest tightness and stridor.    Cardiovascular: Negative for chest pain, palpitations and leg swelling.       Objective   Physical Exam   Constitutional: He is oriented to person, place, and time. He appears well-developed and well-nourished.   HENT:   Head: Normocephalic and atraumatic.   Right Ear: External ear normal.   Left Ear: External ear normal.   Nose: Nose normal.   Mouth/Throat: Oropharynx is clear and moist.   Eyes: Conjunctivae are normal.   Neck: Normal range of motion.   Cardiovascular: Normal rate, regular rhythm and normal heart sounds. Exam reveals no gallop and no friction rub.   No murmur heard.  Pulmonary/Chest: Effort normal and breath sounds normal. No stridor. No respiratory distress. He has no wheezes. He has no rales.   Abdominal: Soft. Bowel sounds are normal. He exhibits no distension and no mass. There is no tenderness. There is no guarding.   Neurological: He is alert and oriented to person, place, and time.   Skin: Skin is warm and dry.   Vitals reviewed.        Assessment/Plan   Moshe was seen today for follow-up.    Diagnoses and all orders for this visit:    Essential hypertension  -     Comprehensive metabolic panel; Future  -     CBC w AUTO Differential; Future    Nonintractable epilepsy without status epilepticus, unspecified epilepsy type (CMS/HCC)        Return to the clinic in 3 month/s.  Will contact with results as  needed.

## 2019-08-01 RX ORDER — METOPROLOL TARTRATE 50 MG/1
TABLET, FILM COATED ORAL
Qty: 60 TABLET | Refills: 5 | Status: SHIPPED | OUTPATIENT
Start: 2019-08-01 | End: 2020-03-02

## 2019-08-08 ENCOUNTER — TELEPHONE (OUTPATIENT)
Dept: FAMILY MEDICINE CLINIC | Facility: CLINIC | Age: 39
End: 2019-08-08

## 2019-08-14 ENCOUNTER — OFFICE VISIT (OUTPATIENT)
Dept: FAMILY MEDICINE CLINIC | Facility: CLINIC | Age: 39
End: 2019-08-14

## 2019-08-14 ENCOUNTER — OFFICE VISIT (OUTPATIENT)
Dept: SLEEP MEDICINE | Facility: HOSPITAL | Age: 39
End: 2019-08-14

## 2019-08-14 VITALS
HEIGHT: 67 IN | OXYGEN SATURATION: 100 % | WEIGHT: 215.5 LBS | HEART RATE: 75 BPM | BODY MASS INDEX: 33.82 KG/M2 | SYSTOLIC BLOOD PRESSURE: 142 MMHG | DIASTOLIC BLOOD PRESSURE: 80 MMHG

## 2019-08-14 VITALS
BODY MASS INDEX: 33.82 KG/M2 | HEART RATE: 72 BPM | HEIGHT: 67 IN | SYSTOLIC BLOOD PRESSURE: 138 MMHG | WEIGHT: 215.5 LBS | OXYGEN SATURATION: 97 % | DIASTOLIC BLOOD PRESSURE: 80 MMHG

## 2019-08-14 DIAGNOSIS — Z00.00 WELLNESS EXAMINATION: Primary | ICD-10-CM

## 2019-08-14 DIAGNOSIS — F51.3 SOMNAMBULISM: ICD-10-CM

## 2019-08-14 DIAGNOSIS — G47.33 OBSTRUCTIVE SLEEP APNEA, ADULT: Primary | ICD-10-CM

## 2019-08-14 PROCEDURE — 99214 OFFICE O/P EST MOD 30 MIN: CPT | Performed by: INTERNAL MEDICINE

## 2019-08-14 PROCEDURE — G0439 PPPS, SUBSEQ VISIT: HCPCS | Performed by: FAMILY MEDICINE

## 2019-08-14 RX ORDER — ESCITALOPRAM OXALATE 20 MG/1
20 TABLET ORAL DAILY
COMMUNITY
End: 2020-01-24

## 2019-08-14 RX ORDER — DIVALPROEX SODIUM 500 MG/1
500 TABLET, DELAYED RELEASE ORAL 3 TIMES DAILY
COMMUNITY
End: 2020-01-24

## 2019-08-14 RX ORDER — PHENOBARBITAL 100 MG/1
100 TABLET ORAL 2 TIMES DAILY
COMMUNITY
End: 2020-01-24

## 2019-08-14 RX ORDER — ARIPIPRAZOLE 10 MG/1
10 TABLET ORAL DAILY
COMMUNITY
End: 2020-01-24

## 2019-08-14 NOTE — PATIENT INSTRUCTIONS
Advance Directive    Advance directives are legal documents that let you make choices ahead of time about your health care and medical treatment in case you become unable to communicate for yourself. Advance directives are a way for you to communicate your wishes to family, friends, and health care providers. This can help convey your decisions about end-of-life care if you become unable to communicate.  Discussing and writing advance directives should happen over time rather than all at once. Advance directives can be changed depending on your situation and what you want, even after you have signed the advance directives.  If you do not have an advance directive, some states assign family decision makers to act on your behalf based on how closely you are related to them. Each state has its own laws regarding advance directives. You may want to check with your health care provider, , or state representative about the laws in your state. There are different types of advance directives, such as:  · Medical power of .  · Living will.  · Do not resuscitate (DNR) or do not attempt resuscitation (DNAR) order.  Health care proxy and medical power of   A health care proxy, also called a health care agent, is a person who is appointed to make medical decisions for you in cases in which you are unable to make the decisions yourself. Generally, people choose someone they know well and trust to represent their preferences. Make sure to ask this person for an agreement to act as your proxy. A proxy may have to exercise judgment in the event of a medical decision for which your wishes are not known.  A medical power of  is a legal document that names your health care proxy. Depending on the laws in your state, after the document is written, it may also need to be:  · Signed.  · Notarized.  · Dated.  · Copied.  · Witnessed.  · Incorporated into your medical record.  You may also want to appoint  someone to manage your financial affairs in a situation in which you are unable to do so. This is called a durable power of  for finances. It is a separate legal document from the durable power of  for health care. You may choose the same person or someone different from your health care proxy to act as your agent in financial matters.  If you do not appoint a proxy, or if there is a concern that the proxy is not acting in your best interests, a court-appointed guardian may be designated to act on your behalf.  Living will  A living will is a set of instructions documenting your wishes about medical care when you cannot express them yourself. Health care providers should keep a copy of your living will in your medical record. You may want to give a copy to family members or friends. To alert caregivers in case of an emergency, you can place a card in your wallet to let them know that you have a living will and where they can find it. A living will is used if you become:  · Terminally ill.  · Incapacitated.  · Unable to communicate or make decisions.  Items to consider in your living will include:  · The use or non-use of life-sustaining equipment, such as dialysis machines and breathing machines (ventilators).  · A DNR or DNAR order, which is the instruction not to use cardiopulmonary resuscitation (CPR) if breathing or heartbeat stops.  · The use or non-use of tube feeding.  · Withholding of food and fluids.  · Comfort (palliative) care when the goal becomes comfort rather than a cure.  · Organ and tissue donation.  A living will does not give instructions for distributing your money and property if you should pass away. It is recommended that you seek the advice of a  when writing a will. Decisions about taxes, beneficiaries, and asset distribution will be legally binding. This process can relieve your family and friends of any concerns surrounding disputes or questions that may come up about  "the distribution of your assets.  DNR or DNAR  A DNR or DNAR order is a request not to have CPR in the event that your heart stops beating or you stop breathing. If a DNR or DNAR order has not been made and shared, a health care provider will try to help any patient whose heart has stopped or who has stopped breathing. If you plan to have surgery, talk with your health care provider about how your DNR or DNAR order will be followed if problems occur.  Summary  · Advance directives are the legal documents that allow you to make choices ahead of time about your health care and medical treatment in case you become unable to communicate for yourself.  · The process of discussing and writing advance directives should happen over time. You can change the advance directives, even after you have signed them.  · Advance directives include DNR or DNAR orders, living cheney, and designating an agent as your medical power of .  This information is not intended to replace advice given to you by your health care provider. Make sure you discuss any questions you have with your health care provider.  Document Released: 03/26/2009 Document Revised: 11/06/2017 Document Reviewed: 11/06/2017  [employer] Interactive Patient Education © 2019 [employer]  DASH Eating Plan  DASH stands for \"Dietary Approaches to Stop Hypertension.\" The DASH eating plan is a healthy eating plan that has been shown to reduce high blood pressure (hypertension). It may also reduce your risk for type 2 diabetes, heart disease, and stroke. The DASH eating plan may also help with weight loss.  What are tips for following this plan?    General guidelines  · Avoid eating more than 2,300 mg (milligrams) of salt (sodium) a day. If you have hypertension, you may need to reduce your sodium intake to 1,500 mg a day.  · Limit alcohol intake to no more than 1 drink a day for nonpregnant women and 2 drinks a day for men. One drink equals 12 oz of beer, 5 oz of wine, " "or 1½ oz of hard liquor.  · Work with your health care provider to maintain a healthy body weight or to lose weight. Ask what an ideal weight is for you.  · Get at least 30 minutes of exercise that causes your heart to beat faster (aerobic exercise) most days of the week. Activities may include walking, swimming, or biking.  · Work with your health care provider or diet and nutrition specialist (dietitian) to adjust your eating plan to your individual calorie needs.  Reading food labels    · Check food labels for the amount of sodium per serving. Choose foods with less than 5 percent of the Daily Value of sodium. Generally, foods with less than 300 mg of sodium per serving fit into this eating plan.  · To find whole grains, look for the word \"whole\" as the first word in the ingredient list.  Shopping  · Buy products labeled as \"low-sodium\" or \"no salt added.\"  · Buy fresh foods. Avoid canned foods and premade or frozen meals.  Cooking  · Avoid adding salt when cooking. Use salt-free seasonings or herbs instead of table salt or sea salt. Check with your health care provider or pharmacist before using salt substitutes.  · Do not gamez foods. Cook foods using healthy methods such as baking, boiling, grilling, and broiling instead.  · Cook with heart-healthy oils, such as olive, canola, soybean, or sunflower oil.  Meal planning  · Eat a balanced diet that includes:  ? 5 or more servings of fruits and vegetables each day. At each meal, try to fill half of your plate with fruits and vegetables.  ? Up to 6-8 servings of whole grains each day.  ? Less than 6 oz of lean meat, poultry, or fish each day. A 3-oz serving of meat is about the same size as a deck of cards. One egg equals 1 oz.  ? 2 servings of low-fat dairy each day.  ? A serving of nuts, seeds, or beans 5 times each week.  ? Heart-healthy fats. Healthy fats called Omega-3 fatty acids are found in foods such as flaxseeds and coldwater fish, like sardines, salmon, " and mackerel.  · Limit how much you eat of the following:  ? Canned or prepackaged foods.  ? Food that is high in trans fat, such as fried foods.  ? Food that is high in saturated fat, such as fatty meat.  ? Sweets, desserts, sugary drinks, and other foods with added sugar.  ? Full-fat dairy products.  · Do not salt foods before eating.  · Try to eat at least 2 vegetarian meals each week.  · Eat more home-cooked food and less restaurant, buffet, and fast food.  · When eating at a restaurant, ask that your food be prepared with less salt or no salt, if possible.  What foods are recommended?  The items listed may not be a complete list. Talk with your dietitian about what dietary choices are best for you.  Grains  Whole-grain or whole-wheat bread. Whole-grain or whole-wheat pasta. Brown rice. Oatmeal. Quinoa. Bulgur. Whole-grain and low-sodium cereals. Ellen bread. Low-fat, low-sodium crackers. Whole-wheat flour tortillas.  Vegetables  Fresh or frozen vegetables (raw, steamed, roasted, or grilled). Low-sodium or reduced-sodium tomato and vegetable juice. Low-sodium or reduced-sodium tomato sauce and tomato paste. Low-sodium or reduced-sodium canned vegetables.  Fruits  All fresh, dried, or frozen fruit. Canned fruit in natural juice (without added sugar).  Meat and other protein foods  Skinless chicken or turkey. Ground chicken or turkey. Pork with fat trimmed off. Fish and seafood. Egg whites. Dried beans, peas, or lentils. Unsalted nuts, nut butters, and seeds. Unsalted canned beans. Lean cuts of beef with fat trimmed off. Low-sodium, lean deli meat.  Dairy  Low-fat (1%) or fat-free (skim) milk. Fat-free, low-fat, or reduced-fat cheeses. Nonfat, low-sodium ricotta or cottage cheese. Low-fat or nonfat yogurt. Low-fat, low-sodium cheese.  Fats and oils  Soft margarine without trans fats. Vegetable oil. Low-fat, reduced-fat, or light mayonnaise and salad dressings (reduced-sodium). Canola, safflower, olive, soybean, and  sunflower oils. Avocado.  Seasoning and other foods  Herbs. Spices. Seasoning mixes without salt. Unsalted popcorn and pretzels. Fat-free sweets.  What foods are not recommended?  The items listed may not be a complete list. Talk with your dietitian about what dietary choices are best for you.  Grains  Baked goods made with fat, such as croissants, muffins, or some breads. Dry pasta or rice meal packs.  Vegetables  Creamed or fried vegetables. Vegetables in a cheese sauce. Regular canned vegetables (not low-sodium or reduced-sodium). Regular canned tomato sauce and paste (not low-sodium or reduced-sodium). Regular tomato and vegetable juice (not low-sodium or reduced-sodium). Pickles. Olives.  Fruits  Canned fruit in a light or heavy syrup. Fried fruit. Fruit in cream or butter sauce.  Meat and other protein foods  Fatty cuts of meat. Ribs. Fried meat. Massey. Sausage. Bologna and other processed lunch meats. Salami. Fatback. Hotdogs. Bratwurst. Salted nuts and seeds. Canned beans with added salt. Canned or smoked fish. Whole eggs or egg yolks. Chicken or turkey with skin.  Dairy  Whole or 2% milk, cream, and half-and-half. Whole or full-fat cream cheese. Whole-fat or sweetened yogurt. Full-fat cheese. Nondairy creamers. Whipped toppings. Processed cheese and cheese spreads.  Fats and oils  Butter. Stick margarine. Lard. Shortening. Ghee. Massey fat. Tropical oils, such as coconut, palm kernel, or palm oil.  Seasoning and other foods  Salted popcorn and pretzels. Onion salt, garlic salt, seasoned salt, table salt, and sea salt. Worcestershire sauce. Tartar sauce. Barbecue sauce. Teriyaki sauce. Soy sauce, including reduced-sodium. Steak sauce. Canned and packaged gravies. Fish sauce. Oyster sauce. Cocktail sauce. Horseradish that you find on the shelf. Ketchup. Mustard. Meat flavorings and tenderizers. Bouillon cubes. Hot sauce and Tabasco sauce. Premade or packaged marinades. Premade or packaged taco seasonings.  Relishes. Regular salad dressings.  Where to find more information:  · National Heart, Lung, and Blood [place]: www.nhlbi.nih.gov  · American Heart Association: www.heart.org  Summary  · The DASH eating plan is a healthy eating plan that has been shown to reduce high blood pressure (hypertension). It may also reduce your risk for type 2 diabetes, heart disease, and stroke.  · With the DASH eating plan, you should limit salt (sodium) intake to 2,300 mg a day. If you have hypertension, you may need to reduce your sodium intake to 1,500 mg a day.  · When on the DASH eating plan, aim to eat more fresh fruits and vegetables, whole grains, lean proteins, low-fat dairy, and heart-healthy fats.  · Work with your health care provider or diet and nutrition specialist (dietitian) to adjust your eating plan to your individual calorie needs.  This information is not intended to replace advice given to you by your health care provider. Make sure you discuss any questions you have with your health care provider.  Document Released: 12/06/2012 Document Revised: 12/11/2017 Document Reviewed: 12/11/2017  [employer] Interactive Patient Education © 2019 [employer]  Fall Prevention in the Home, Adult  Falls can cause injuries. They can happen to people of all ages. There are many things you can do to make your home safe and to help prevent falls. Ask for help when making these changes, if needed.  What actions can I take to prevent falls?  General Instructions  · Use good lighting in all rooms. Replace any light bulbs that burn out.  · Turn on the lights when you go into a dark area. Use night-lights.  · Keep items that you use often in easy-to-reach places. Lower the shelves around your home if necessary.  · Set up your furniture so you have a clear path. Avoid moving your furniture around.  · Do not have throw rugs and other things on the floor that can make you trip.  · Avoid walking on wet floors.  · If any of your floors are  uneven, fix them.  · Add color or contrast paint or tape to clearly aidee and help you see:  ? Any grab bars or handrails.  ? First and last steps of stairways.  ? Where the edge of each step is.  · If you use a stepladder:  ? Make sure that it is fully opened. Do not climb a closed stepladder.  ? Make sure that both sides of the stepladder are locked into place.  ? Ask someone to hold the stepladder for you while you use it.  · If there are any pets around you, be aware of where they are.  What can I do in the bathroom?    · Keep the floor dry. Clean up any water that spills onto the floor as soon as it happens.  · Remove soap buildup in the tub or shower regularly.  · Use non-skid mats or decals on the floor of the tub or shower.  · Attach bath mats securely with double-sided, non-slip rug tape.  · If you need to sit down in the shower, use a plastic, non-slip stool.  · Install grab bars by the toilet and in the tub and shower. Do not use towel bars as grab bars.  What can I do in the bedroom?  · Make sure that you have a light by your bed that is easy to reach.  · Do not use any sheets or blankets that are too big for your bed. They should not hang down onto the floor.  · Have a firm chair that has side arms. You can use this for support while you get dressed.  What can I do in the kitchen?  · Clean up any spills right away.  · If you need to reach something above you, use a strong step stool that has a grab bar.  · Keep electrical cords out of the way.  · Do not use floor polish or wax that makes floors slippery. If you must use wax, use non-skid floor wax.  What can I do with my stairs?  · Do not leave any items on the stairs.  · Make sure that you have a light switch at the top of the stairs and the bottom of the stairs. If you do not have them, ask someone to add them for you.  · Make sure that there are handrails on both sides of the stairs, and use them. Fix handrails that are broken or loose. Make sure that  handrails are as long as the stairways.  · Install non-slip stair treads on all stairs in your home.  · Avoid having throw rugs at the top or bottom of the stairs. If you do have throw rugs, attach them to the floor with carpet tape.  · Choose a carpet that does not hide the edge of the steps on the stairway.  · Check any carpeting to make sure that it is firmly attached to the stairs. Fix any carpet that is loose or worn.  What can I do on the outside of my home?  · Use bright outdoor lighting.  · Regularly fix the edges of walkways and driveways and fix any cracks.  · Remove anything that might make you trip as you walk through a door, such as a raised step or threshold.  · Trim any bushes or trees on the path to your home.  · Regularly check to see if handrails are loose or broken. Make sure that both sides of any steps have handrails.  · Install guardrails along the edges of any raised decks and porches.  · Clear walking paths of anything that might make someone trip, such as tools or rocks.  · Have any leaves, snow, or ice cleared regularly.  · Use sand or salt on walking paths during winter.  · Clean up any spills in your garage right away. This includes grease or oil spills.  What other actions can I take?  · Wear shoes that:  ? Have a low heel. Do not wear high heels.  ? Have rubber bottoms.  ? Are comfortable and fit you well.  ? Are closed at the toe. Do not wear open-toe sandals.  · Use tools that help you move around (mobility aids) if they are needed. These include:  ? Canes.  ? Walkers.  ? Scooters.  ? Crutches.  · Review your medicines with your doctor. Some medicines can make you feel dizzy. This can increase your chance of falling.  Ask your doctor what other things you can do to help prevent falls.  Where to find more information  · Centers for Disease Control and Prevention, [place]: https://cdc.gov  · National [place] on Aging: https://jl0gbnd.emelia.nih.gov  Contact a doctor if:  · You are afraid  of falling at home.  · You feel weak, drowsy, or dizzy at home.  · You fall at home.  Summary  · There are many simple things that you can do to make your home safe and to help prevent falls.  · Ways to make your home safe include removing tripping hazards and installing grab bars in the bathroom.  · Ask for help when making these changes in your home.  This information is not intended to replace advice given to you by your health care provider. Make sure you discuss any questions you have with your health care provider.  Document Released: 10/14/2010 Document Revised: 08/02/2018 Document Reviewed: 08/02/2018  [employer] Interactive Patient Education © 2019 [employer]  How to Increase Your Level of Physical Activity    Getting regular physical activity is important for your overall health and well-being. Most people do not get enough exercise. There are easy ways to increase your level of physical activity, even if you have not been very active in the past or you are just starting out.  Why is physical activity important?  Physical activity has many short-term and long-term health benefits. Regular exercise can:  · Help you lose weight or maintain a healthy weight.  · Strengthen your muscles and bones.  · Boost your mood and improve self-esteem.  · Reduce your risk of certain long-term (chronic) diseases, like heart disease, cancer, and diabetes.  · Help you stay capable of walking and moving around (mobile) as you age.  · Prevent accidents, such as falls, as you age.  · Increase life expectancy.  What are the benefits of being physically active on a regular basis?  In addition to improving your physical health, being physically active on most days of the week can help you in ways that you may not expect. Benefits of regular physical activity may include:  · Feeling good about your body.  · Being able to move around more easily and for longer periods of time without getting tired (increased stamina).  · Finding new  sources of fun and enjoyment.  · Meeting new people who share a common interest.  · Being able to fight off illness better (enhanced immunity).  · Being able to sleep better.  What can happen if I am not physically active on a regular basis?  Not getting enough physical activity can lead to an unhealthy lifestyle and future health problems. This can increase your chances of:  · Becoming overweight or obese.  · Becoming sick.  · Developing chronic illnesses, like heart disease or diabetes.  · Having mental health problems, like depression or anxiety.  · Having sleep problems.  · Having trouble walking or getting yourself around (reduced mobility).  · Injuring yourself in a fall as you get older.  What steps can I take to be more physically active?  · Check with your health care provider about how to get started. Ask your health care provider what activities are safe for you.  · Start out slowly. Walking or doing some simple chair exercises is a good place to start, especially if you have not been active before or for a long time.  · Try to find activities that you enjoy. You are more likely to commit to an exercise routine if it does not feel like a chore.  · If you have bone or joint problems, choose low-impact exercises, like walking or swimming.  · Include physical activity in your everyday routine.  · Invite friends or family members to exercise with you. This also will help you commit to your workout plan.  · Set goals that you can work toward.  · Aim for at least 150 minutes of moderate-intensity exercise each week. Examples of moderate-intensity exercise include walking or riding a bike.  Where to find more information  · Centers for Disease Control and Prevention: www.cdc.gov/physicalactivity/index.html  · [employer] [place] on Fitness, Sports & Nutrition www.fitness.gov/resource-center  · [employer]My[employer]: www.choosemyplate.gov/physical-activity  Contact a health care provider if:  · You have headaches, muscle  aches, or joint pain.  · You feel dizzy or light-headed while exercising.  · You faint.  · You have chest pain while exercising.  Summary  · Exercise benefits your mind and body at any age, even if you are just starting out.  · If you have a chronic illness or have not been active for a while, check with your health care provider before increasing your physical activity.  · Choose activities that are safe and enjoyable for you. Ask your health care provider what activities are safe for you.  · Start slowly. Tell your health care provider if you have problems as you start to increase your activity level.  This information is not intended to replace advice given to you by your health care provider. Make sure you discuss any questions you have with your health care provider.  Document Released: 12/07/2017 Document Revised: 12/07/2017 Document Reviewed: 12/07/2017  [employer] Interactive Patient Education © 2019 [employer]

## 2019-08-14 NOTE — PROGRESS NOTES
The ABCs of the Annual Wellness Visit  Subsequent Medicare Wellness Visit    Chief Complaint   Patient presents with   • Medicare Wellness-subsequent       Subjective   History of Present Illness:  Nicholas Duane Stovall is a 39 y.o. male who presents for a Subsequent Medicare Wellness Visit.    HEALTH RISK ASSESSMENT    Recent Hospitalizations:  No hospitalization(s) within the last year.    Current Medical Providers:  Patient Care Team:  Calos Greenberg MD as PCP - General  Calos Greenberg MD as PCP - Claims Attributed  Jass Mendoza MD as Consulting Physician (Sleep Medicine)  Chance Narayanan MD as Consulting Physician (Dermatology)    Smoking Status:  Social History     Tobacco Use   Smoking Status Never Smoker   Smokeless Tobacco Never Used       Alcohol Consumption:  Social History     Substance and Sexual Activity   Alcohol Use No       Depression Screen:   PHQ-2/PHQ-9 Depression Screening 8/14/2019   Little interest or pleasure in doing things 0   Feeling down, depressed, or hopeless 0   Trouble falling or staying asleep, or sleeping too much -   Feeling tired or having little energy -   Poor appetite or overeating -   Feeling bad about yourself - or that you are a failure or have let yourself or your family down -   Trouble concentrating on things, such as reading the newspaper or watching television -   Moving or speaking so slowly that other people could have noticed. Or the opposite - being so fidgety or restless that you have been moving around a lot more than usual -   Thoughts that you would be better off dead, or of hurting yourself in some way -   Total Score 0   If you checked off any problems, how difficult have these problems made it for you to do your work, take care of things at home, or get along with other people? -       Fall Risk Screen:  STEADI Fall Risk Assessment has not been completed.    Health Habits and Functional and Cognitive Screening:  Functional & Cognitive Status  8/14/2019   Do you have difficulty preparing food and eating? No   Do you have difficulty bathing yourself, getting dressed or grooming yourself? No   Do you have difficulty using the toilet? No   Do you have difficulty moving around from place to place? No   Do you have trouble with steps or getting out of a bed or a chair? No   Current Diet Well Balanced Diet   Dental Exam Up to date   Eye Exam Up to date   Exercise (times per week) 0 times per week   Current Exercise Activities Include None   Do you need help using the phone?  No   Are you deaf or do you have serious difficulty hearing?  No   Do you need help with transportation? Yes   Do you need help shopping? No   Do you need help preparing meals?  No   Do you need help with housework?  No   Do you need help with laundry? No   Do you need help taking your medications? No   Do you need help managing money? No   Do you ever drive or ride in a car without wearing a seat belt? No   Have you felt unusual stress, anger or loneliness in the last month? No   Who do you live with? Other   If you need help, do you have trouble finding someone available to you? No   Have you been bothered in the last four weeks by sexual problems? No   Do you have difficulty concentrating, remembering or making decisions? No         Does the patient have evidence of cognitive impairment? No    Asprin use counseling:Does not need ASA (and currently is not on it)    Age-appropriate Screening Schedule:  Refer to the list below for future screening recommendations based on patient's age, sex and/or medical conditions. Orders for these recommended tests are listed in the plan section. The patient has been provided with a written plan.    Health Maintenance   Topic Date Due   • INFLUENZA VACCINE  08/01/2019   • TDAP/TD VACCINES (2 - Td) 07/10/2027          The following portions of the patient's history were reviewed and updated as appropriate: allergies, current medications, past family  history, past medical history, past social history, past surgical history and problem list.    Outpatient Medications Prior to Visit   Medication Sig Dispense Refill   • amLODIPine (NORVASC) 10 MG tablet TAKE ONE TABLET BY MOUTH EVERY DAY 30 tablet 5   • ARIPiprazole (ABILIFY) 10 MG tablet Take 10 mg by mouth Daily.     • busPIRone (BUSPAR) 10 MG tablet Take 10 mg by mouth 2 (Two) Times a Day.     • carBAMazepine XR (TEGRETOL-XR) 200 MG 12 hr tablet Take 200 mg by mouth 2 (two) times a day. Med Name: Tegretol  mg tablet,extended release; Indication: seizures     • citalopram (CELEXA) 20 MG tablet Take 40 mg by mouth daily. Indication: depression     • cyclobenzaprine (FLEXERIL) 5 MG tablet TAKE ONE TABLET BY MOUTH THREE TIMES A DAY AS NEEDED FOR MUSCLE SPASMS 21 tablet 1   • divalproex (DEPAKOTE) 500 MG DR tablet Take 500 mg by mouth 3 (Three) Times a Day.     • escitalopram (LEXAPRO) 20 MG tablet Take 20 mg by mouth Daily.     • levETIRAcetam (KEPPRA) 500 MG tablet Take  by mouth Daily. 3 tabs AM, 3 tabs PM; Indication: seizures     • metoprolol tartrate (LOPRESSOR) 50 MG tablet TAKE ONE TABLET BY MOUTH TWO TIMES A DAY 60 tablet 5   • minocycline (MINOCIN,DYNACIN) 100 MG capsule Take 100 mg by mouth Daily.     • Multiple Vitamins-Minerals (MULTIVITAMIN ADULT PO) Take 1 tablet by mouth 2 (Two) Times a Day.     • perphenazine (TRILAFON) 2 MG tablet Take 1 tablet by mouth Daily.  0   • PHENobarbital (LUMINAL) 100 MG tablet Take 100 mg by mouth 2 (Two) Times a Day.     • primidone (MYSOLINE) 50 MG tablet Take 3 tablets by mouth 2 (Two) Times a Day. 1 tablet in the AM, 2 tabs in the PM     • risperiDONE (RISPERDAL) 4 MG tablet TAKE 2 MG IN THE MORNING AND 6 MG IN THE EVENING     • metroNIDAZOLE (METROGEL) 0.75 % gel Apply 1 application topically 2 (Two) Times a Day.       No facility-administered medications prior to visit.        Patient Active Problem List   Diagnosis   • Schizophrenia (CMS/HCC)   • Sleep apnea  "  • Panic disorder   • Need for immunization against influenza   • Hypertensive disorder   • Essential hypertension   • Epilepsy (CMS/[facility])   • Chronic depression   • Anemia   • Abnormality of right ventricle of heart   • Non-rheumatic mitral regurgitation   • Class 2 obesity due to excess calories without serious comorbidity with body mass index (BMI) of 35.0 to 35.9 in adult       Advanced Care Planning:  Patient does not have an advance directive - information provided to the patient today    Review of Systems    Compared to one year ago, the patient feels his physical health is better.  Compared to one year ago, the patient feels his mental health is better.    Reviewed chart for potential of high risk medication in the elderly: not applicable  Reviewed chart for potential of harmful drug interactions in the elderly:not applicable    Objective         Vitals:    08/14/19 1409   BP: 142/80   Pulse: 75   SpO2: 100%   Weight: 97.8 kg (215 lb 8 oz)   Height: 170.2 cm (67\")       Body mass index is 33.75 kg/m².  Discussed the patient's BMI with him. The BMI is elevated. Will send an activity planhome with him..    Physical Exam          Assessment/Plan   Medicare Risks and Personalized Health Plan  CMS Preventative Services Quick Reference  Abdominal Aortic Aneurysm Screening  Fall Risk    The above risks/problems have been discussed with the patient.  Pertinent information has been shared with the patient in the After Visit Summary.  Follow up plans and orders are seen below in the Assessment/Plan Section.    There are no diagnoses linked to this encounter.  Follow Up:  No Follow-up on file.     An After Visit Summary and PPPS were given to the patient.             "
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
## 2019-08-28 ENCOUNTER — TELEPHONE (OUTPATIENT)
Dept: SLEEP MEDICINE | Facility: HOSPITAL | Age: 39
End: 2019-08-28

## 2019-09-12 RX ORDER — CYCLOBENZAPRINE HCL 5 MG
TABLET ORAL
Qty: 21 TABLET | Refills: 1 | Status: SHIPPED | OUTPATIENT
Start: 2019-09-12 | End: 2019-10-02 | Stop reason: SDUPTHER

## 2019-10-02 DIAGNOSIS — I34.0 MITRAL VALVE INSUFFICIENCY, UNSPECIFIED ETIOLOGY: Primary | ICD-10-CM

## 2019-10-02 RX ORDER — CYCLOBENZAPRINE HCL 5 MG
TABLET ORAL
Qty: 21 TABLET | Refills: 1 | Status: SHIPPED | OUTPATIENT
Start: 2019-10-02 | End: 2019-12-13 | Stop reason: SDUPTHER

## 2019-10-03 ENCOUNTER — OFFICE VISIT (OUTPATIENT)
Dept: CARDIOLOGY | Facility: CLINIC | Age: 39
End: 2019-10-03

## 2019-10-03 VITALS
OXYGEN SATURATION: 99 % | HEIGHT: 67 IN | DIASTOLIC BLOOD PRESSURE: 84 MMHG | HEART RATE: 68 BPM | BODY MASS INDEX: 34.36 KG/M2 | WEIGHT: 218.9 LBS | SYSTOLIC BLOOD PRESSURE: 124 MMHG

## 2019-10-03 DIAGNOSIS — Q20.8 ABNORMALITY OF RIGHT VENTRICLE OF HEART: ICD-10-CM

## 2019-10-03 DIAGNOSIS — R07.89 CHEST PAIN, ATYPICAL: Primary | ICD-10-CM

## 2019-10-03 DIAGNOSIS — I34.0 NONRHEUMATIC MITRAL VALVE REGURGITATION: ICD-10-CM

## 2019-10-03 DIAGNOSIS — E66.09 CLASS 2 OBESITY DUE TO EXCESS CALORIES WITHOUT SERIOUS COMORBIDITY WITH BODY MASS INDEX (BMI) OF 35.0 TO 35.9 IN ADULT: ICD-10-CM

## 2019-10-03 PROCEDURE — 93000 ELECTROCARDIOGRAM COMPLETE: CPT | Performed by: INTERNAL MEDICINE

## 2019-10-03 PROCEDURE — 99214 OFFICE O/P EST MOD 30 MIN: CPT | Performed by: INTERNAL MEDICINE

## 2019-10-03 NOTE — PROGRESS NOTES
Cardiovascular Medicine   Nick Lao M.D., Ph.D., [facility]      The patient returns to cardiovascular clinic for follow-up of the following:    PROBLEM LIST:  1.  RV dilatation without evidence of ASD  2.  Mitral regurgitation  3.  HEAVEN on CPAP    Nicholas Duane Stovall is a 39 y.o. male who returns to clinic today in follow-up.  He returns today in follow for his right ventricular dilatation.  He has had interval 2-D echoes showing no evidence of interval right ventricular dilatation.  No evidence of pulmonary hypertension.  No evidence of ASD.  He continues with stable dyspnea.  He does have HEAVEN for which he is on CPAP.    His last 2-D TTE showed a mild degree of mitral regurgitation.  He remains asymptomatic.  He denies palpitations.  No dizziness, lightheadedness or syncope. He does report an episode of CP about one month ago. This happened twice. It was non-exertional. This was a stabbing sensation. He has not had recurrence. These events lasted about 30 mins.     Review of Systems   Cardiovascular: Positive for chest pain. Negative for claudication, cyanosis, irregular heartbeat, leg swelling, near-syncope, orthopnea, palpitations, paroxysmal nocturnal dyspnea and syncope.   Respiratory: Negative for cough, hemoptysis and shortness of breath.      family history includes Breast cancer in an other family member; Cancer in his maternal grandmother and mother; Diabetes in an other family member; Heart disease in an other family member; Hyperlipidemia in his mother; Hypertension in his mother; No Known Problems in his father; Other in his mother; Rheum arthritis in his mother; Stroke in his maternal grandmother and mother.   reports that he has never smoked. He has never used smokeless tobacco. He reports that he does not drink alcohol or use drugs.  Allergies   Allergen Reactions   • Abilify [Aripiprazole] Confusion and Hallucinations   • Lexapro [Escitalopram] Other (See Comments)     Other reaction(s):  JERKING   • Depakote [Valproic Acid] Other (See Comments)     Other reaction(s): Other (See Comments)  Other reaction(s): TREMORS  Other reaction(s): TREMORS   • Mirtazapine Other (See Comments)     Other reaction(s): Other (See Comments)  Other reaction(s): JERKING IN SLEEP  Other reaction(s): JERKING IN SLEEP   • Phenobarbital Confusion and Hallucinations     Hyper, Hallucination       Current Outpatient Medications:   •  amLODIPine (NORVASC) 10 MG tablet, TAKE ONE TABLET BY MOUTH EVERY DAY, Disp: 30 tablet, Rfl: 5  •  busPIRone (BUSPAR) 10 MG tablet, Take 10 mg by mouth 2 (Two) Times a Day., Disp: , Rfl:   •  carBAMazepine XR (TEGRETOL-XR) 200 MG 12 hr tablet, Take 200 mg by mouth 2 (two) times a day. Med Name: Tegretol  mg tablet,extended release; Indication: seizures, Disp: , Rfl:   •  citalopram (CELEXA) 20 MG tablet, Take 40 mg by mouth daily. Indication: depression, Disp: , Rfl:   •  cyclobenzaprine (FLEXERIL) 5 MG tablet, TAKE 1 TABLET BY MOUTH THREE TIMES A DAY AS NEEDED FOR MUSCLE SPASMS, Disp: 21 tablet, Rfl: 1  •  levETIRAcetam (KEPPRA) 500 MG tablet, Take  by mouth Daily. 3 tabs AM, 3 tabs PM; Indication: seizures, Disp: , Rfl:   •  metoprolol tartrate (LOPRESSOR) 50 MG tablet, TAKE ONE TABLET BY MOUTH TWO TIMES A DAY, Disp: 60 tablet, Rfl: 5  •  minocycline (MINOCIN,DYNACIN) 100 MG capsule, Take 100 mg by mouth Daily., Disp: , Rfl:   •  Multiple Vitamins-Minerals (MULTIVITAMIN ADULT PO), Take 1 tablet by mouth 2 (Two) Times a Day., Disp: , Rfl:   •  primidone (MYSOLINE) 50 MG tablet, Take 3 tablets by mouth 2 (Two) Times a Day. 1 tablet in the AM, 2 tabs in the PM, Disp: , Rfl:   •  risperiDONE (RISPERDAL) 4 MG tablet, TAKE 2 MG IN THE MORNING AND 6 MG IN THE EVENING, Disp: , Rfl:   •  ARIPiprazole (ABILIFY) 10 MG tablet, Take 10 mg by mouth Daily., Disp: , Rfl:   •  divalproex (DEPAKOTE) 500 MG DR tablet, Take 500 mg by mouth 3 (Three) Times a Day., Disp: , Rfl:   •  escitalopram (LEXAPRO) 20  MG tablet, Take 20 mg by mouth Daily., Disp: , Rfl:   •  perphenazine (TRILAFON) 2 MG tablet, Take 1 tablet by mouth Daily., Disp: , Rfl: 0  •  PHENobarbital (LUMINAL) 100 MG tablet, Take 100 mg by mouth 2 (Two) Times a Day., Disp: , Rfl:     Physical Exam:  Vitals:    10/03/19 1319   BP: 124/84   Pulse: 68   SpO2: 99%     Body mass index is 34.28 kg/m².   Last Weights:   Wt Readings from Last 3 Encounters:   10/03/19 99.3 kg (218 lb 14.4 oz)   08/14/19 97.8 kg (215 lb 8 oz)   08/14/19 97.8 kg (215 lb 8 oz)     Pulse Ox: Normal   General: alert, appears stated age and cooperative  Body Habitus: obese  HEENT: Head: Normocephalic, no lesions, without obvious abnormality. No arcus senilis, xanthelasma or xanthomas.  JVP: 6 cm of water at 45 degrees   Volume/Pulsation: Normal  Heart rate: normal    Heart Rhythm: regular     Heart Sounds: S1: normal intensity  S2: normal intensity  S3: absent   S4: absent  Opening Snap: absent  A2-OS:  absent.   Pericardial rub: absent    Ejection click: None      Murmurs:  absent   Extremity: moves all extremities equally.       DATA REVIEWED:             TTE/NIKO:  Results for orders placed in visit on 08/28/17   Adult Transthoracic Echo Complete    Narrative · Left Ventricle: Left ventricular systolic function is low normal.   Estimated EF appears to be in the range of 51 - 55%  · Left ventricular diastolic function is normal.  · Right Ventricle: Normal right ventricular wall thickness, systolic   function and septal motion noted. Right ventricular cavity is mildly   dilated.  · RV EF: 50-55%  · No evidence of a patent foramen ovale. No evidence of an atrial septal   defect present. Saline test results are negative  · Mild mitral valve regurgitation is present  · No evidence of pulmonary hypertension is present  · There is no evidence of pericardial effusion.                --------------------------------------------------------------------------------------------------  LABS:   Lab  Results   Component Value Date    GLUCOSE 94 07/23/2019    BUN 8 09/25/2019    CREATININE 0.9 09/25/2019    EGFRIFNONA 95 07/23/2019    BCR 10.2 07/23/2019    K 4.5 09/25/2019    CO2 29 09/25/2019    CALCIUM 9.5 09/25/2019    ALBUMIN 4.8 09/25/2019    LABIL2 2.5 09/25/2019    AST 28 09/25/2019    ALT 30 09/25/2019     Lab Results   Component Value Date    WBC 5.4 09/25/2019    HGB 14.0 09/25/2019    HCT 42.4 09/25/2019    MCV 84.0 09/25/2019     09/25/2019     Lab Results   Component Value Date    CHLPL 169 01/13/2017    TRIG 172 01/13/2017    HDL 30 (L) 01/13/2017     01/13/2017     No results found for: TSH, T3OHRDA, F3BLDFU, THYROIDAB  No results found for: CKTOTAL, CKMB, CKMBINDEX, TROPONINI, TROPONINT  No results found for: HGBA1C  No results found for: DDIMER  Lab Results   Component Value Date    ALT 30 09/25/2019     No results found for: HGBA1C  Lab Results   Component Value Date    CREATININE 0.9 09/25/2019     No results found for: IRON, TIBC, FERRITIN  Lab Results   Component Value Date    INR 0.9 02/09/2016    INR 0.9 12/17/2015    PROTIME 11.9 02/09/2016    PROTIME 12.5 12/17/2015       Assessment/Plan     1. Chest pain, atypical. He had two episodes and does have risk factors. He is unable to exercise on a treadmill due to back pain.   - Adult Stress Echo W/ Cont or Stress Agent if Necessary Per Protocol    2. Nonrheumatic mitral valve regurgitation. ACC stage B.  There are no surgical indications at this time.  · The patient has been advised to remain in clinical surveillance every 12 months.  · Signs and symptoms of worsening valve disease discussed.  I've asked the patient contact me for an earlier appointment if these develop.  · I've recommended a repeat 2D TTE every 3 years.  · TTE due: 2020.  No indication based on 2017 ACC/AHA guidelines for IE prophylaxis for dental procedures: Optimal oral health is recommended through regular professional dental care and the use of appropriate  dental products, such as manual, powered, and ultrasonic toothbrushes; dental floss; and other plaque-removal devices    3. Abnormality of right ventricle of heart  -Monitor    4. Cardiac Risk Assessments based on 2019 ACCF guidelines:  · A team-based care approach is recommended for the control of risk factors associated with ASCAD.  As such, Nicholas Duane Stovall was requested to have ongoing follow-up with their PCP.  · Continue follow-up visits with PCP for monitoring of labs; diet emphasizing intake of vegetables, fruits, nuts, whole grains and fish is recommended.  · Physical activity recommendations were provided.  · Essential HTN is a significant risk factor for stroke, heart disease and vascular disease. I've recommended the patient continue current medications, if any, as prescribed by the primary care provider. I recommended they have close follow-up for ongoing mgmt of this and the medical comorbidities associated with HTN with their PCP.  They were also provided with information regarding maintaining a healthy weight, heart-healthy dietary pattern DASH information.  Goal blood pressure less than 130/80.   · The patient's BMI is recommended to be calculated at least annually.  The patient's BMI is Body mass index is 34.28 kg/m²..  This places the patient in weight class:  Obese Class I: 30-34.9kg/m2.   Hand out, increase aerobic activity  cut out extra servings, decrease soda or juice intake, eat breakfast, eat more fruits and vegetables, family to eat at dinner table more often, have 3 meals a day, increase physical activity, increase water intake, keep TV off during meals, plan meals, reduce fast food intake, reduce portion size and reduce screen time; close PCP follow-up.  · Tobacco status assessed at every visits.  The patient's nicotine status: has never smoked      Return in about 1 year (around 10/3/2020).

## 2019-10-03 NOTE — PATIENT INSTRUCTIONS
Dr. Lao has recommended a pharmacologic (dobutamine) stress test with echocardiography.    What is a Dobutamine Stress Echo Test?     Why do I need a stress test?     This test helps your physician determine how your heart functions when it is made to work harder by injecting dobutamine. Dobutamine is a drug that has an effect on the heart similar to exercise. This test is done on patients that are unable to exercise adequately or have severe lung disease. An echocardiogram, the ultrasound study of the heart, evaluates the heart’s size, how strongly it pumps blood, and how well the valves are working.     The dobutamine stress echo test is useful to determine:   • If there is a decreased supply of blood and oxygen to the heart at rest as well as with exercise   • If there is reduced movement of the heart muscle.   • Overall level of cardiovascular conditioning   • How quickly the heart recovers after exercise   • How hard the heart can work before symptoms develop   • Estimate the risk of surgery that can cause cardiac complications     What happens during the test?   • An echo technician will do a resting scan of your heart while you are lying down on an exam table. You will lie on your back and on your left side  . • A stress  will prep ten small areas on your chest and place electrodes (small, flat, sticky patches) on these areas. The electrodes are attached to an EKG monitor that charts your heart’s electrical activity during the test.   • You will be lying down on an exam table while the technician performs a resting EKG and blood pressure.   • A nurse will place an IV into a vein in your arm or hand. Next, your heart will be “stressed” by injecting a medication called dobutamine into the IV. This medication will increase your heart rate.   • Please tell the technician immediately if you have chest pain, shortness of breath, or any other unusual symptoms at any time.   • The stress lab staff  will watch for any changes on the EKG monitor that suggest the test should be stopped. The testing area is supervised by a physician.   • The echo technician will take images of your heart every three minutes during the test. • At the peak effects of dobutamine, a second echocardiogram will be taken to visualize the heart’s motion with exercise.   • Your heart rate, blood pressure, and EKG will continue to be monitored until the levels are returning to normal. One more echo scan will be done as your heart rate returns to normal    The risks     This test is very safe and there are usually no problems. There is a small risk of an abnormal heartbeat, chest pain or nausea and, if this happens, the appropriate action will be taken. On very rare occasions (approximately 1 in 2000) the test is associated with life-threatening events. If you have any questions about the risks associated with the procedure, your medical team will be able to discuss them with you at the appointment.      Instruction checklist for the dobutamine stress echo test:     . • Bring a list of medications you take with you to the test. Include the name and dosage amounts of each medicine. This information can be found on the prescription or bottle label.     You may take any of your regular morning medications unless otherwise instructed.       § If you have asthma, please bring your inhaler medication with you.     § If you have diabetes, ask your physician how to adjust your medications the day of your test.     • Please refrain from any strenuous exercise or activities the day before your test, or the day of the test     • Do Not eat or drink anything except for small amounts of water for 4 hours prior to your testing time.      • Do Not use lotion or powders on your chest the day of the test.     • Wear loose fitting, comfortable clothing. Pants or shorts and short sleeve shirts are preferred. (No long sleeves.) Do not wear one-piece  undergarments or body suits.      • The Dobutamine Stress Echo takes about one hour to complete including check-in time and actual test time.      • If you need to CANCEL OR CHANGE your appointment, please call [phone].     • If you have any QUESTIONS about the test, Please call [phone] and ask to speak to Dr. Tashia Bae's Medical Assistant. Please leave a message if prompted to do so. We will return your call as soon as possible.     • We request a 24 hour notice for changes or cancellations.    You MUST complete the DSE within 30 calendar days of being ordered by Dr. Lao.     You MUST arrive for your DSE appointment 10 minutes BEFORE the scheduled time, or your test will be rescheduled.    Results:    Dr. Lao will be physically present during your dobutamine stress echo.  You will be given the results of your test in real time.      Mitral Valve Regurgitation    Mitral valve regurgitation, also called mitral regurgitation, is a condition in which blood leaks from the mitral valve in the heart. The mitral valve is located between the upper left chamber (left atrium) and the lower left chamber (left ventricle) of the heart. Normally, this valve opens when the atrium pumps blood into the ventricle, and it closes when the ventricle pumps blood out to the body.  Mitral valve regurgitation happens when the mitral valve does not close properly. As a result, blood in the ventricle leaks back into the atrium. Mitral valve regurgitation causes the heart to work harder to pump blood. If the condition is mild, a person may not have symptoms. However, over time, this can lead to heart failure.  What are the causes?  This condition may be caused by:  · A condition in which the mitral valves do not close completely when the heart pumps blood (mitral valve prolapse).  · Infection, such as endocarditis or rheumatic fever.  · Damage to the mitral valve, such as from injury (trauma) to the heart, a  problem present at birth (birth defect), or a heart attack.  · Certain medicines.  What increases the risk?  This condition is more likely to develop in people who have:  · Certain forms of heart disease.  · A family history of heart valve disease.  · Certain conditions that are present at birth (congenital).  You are also more likely to develop this condition if you have taken certain diet pills in the past.  What are the signs or symptoms?  Symptoms of this condition include:  · Shortness of breath with physical activity, like climbing stairs.  · Fast or irregular heartbeat.  · Cough.  · Suddenly waking up at night with difficulty breathing or needing to urinate.  · Heavy breathing.  · Extreme tiredness.  · Swelling in the lower legs, ankles, and feet.  In some cases of mild to moderate mitral regurgitation, there are no symptoms.  How is this diagnosed?  This condition may be diagnosed based on the results of a physical exam. Your health care provider will listen to your heart for an abnormal heart sound (murmur). You may also have other tests, including:  · An echocardiogram. This test creates ultrasound images of the heart that allow your health care provider to see how the heart valves work while your heart is beating.  · Chest X-ray.  · Electrocardiogram (ECG). This is a test that records the electrical impulses of the heart.  · Cardiac catheterization. This test is used to look at the structure and function of the heart. A thin tube (catheter) is passed through the blood vessels and into the heart. Dye is injected into the blood vessels so the cardiac system can be seen on images that are taken.  How is this treated?  This condition may be treated with:  · Medicines. These may be given to treat symptoms and prevent complications.  · Surgery to repair or replace the mitral valve in severe, long-term (chronic) cases.  Follow these instructions at home:    Lifestyle  · Limit alcohol intake to no more than 1  drink a day for nonpregnant women and 2 drinks a day for men. One drink equals 12 oz of beer, 5 oz of wine, or 1½ oz of hard liquor.  · Do not use any products that contain nicotine or tobacco, such as cigarettes and e-cigarettes. If you need help quitting, ask your health care provider.  · Eat a heart-healthy diet that includes plenty of fresh fruits and vegetables, whole grains, low-fat (lean) protein, and low-fat dairy products. Consider working with a diet and nutrition specialist (dietitian) to help you make healthy food choices.  · Limit the amount of salt (sodium) in your diet. Avoid adding salt to foods, and avoid foods that are high in salt, such as:  ? Pickles.  ? Smoked and cured meats.  ? Processed foods.  · Maintain a healthy weight and stay physically active. Ask your health care provider to recommend activities that are safe for you.  · Try to get 7 or more hours of sleep each night.  · Find ways to manage stress. If you need help with this, ask your health care provider.  General instructions  · Take over-the-counter and prescription medicines only as told by your health care provider.  · Work closely with your health care provider to manage any other health conditions you have, such as diabetes or high blood pressure.  · If you plan to become pregnant, talk with your health care provider first.  · Keep all follow-up visits as told by your health care provider. This is important.  Contact a health care provider if:  · You have a fever.  · You feel more tired than usual when doing physical activity.  · You have a dry cough.  Get help right away if:  · You have shortness of breath.  · You develop chest pain.  · You have swelling in your hands, feet, ankles, or abdomen that is getting worse.  · You have trouble staying awake or you faint.  · You feel dizzy or unsteady.  · You suddenly gain weight.  · You feel confused.  · Any of your symptoms begin to get worse.  These symptoms may represent a serious  problem that is an emergency. Do not wait to see if the symptoms will go away. Get medical help right away. Call your local emergency services (911 in the [place] Do not drive yourself to the hospital.  Summary  · Mitral valve regurgitation, also called mitral regurgitation, is a condition in which blood leaks from a valve between two chambers of the heart (mitral valve).  · Depending on how severe your condition is, you may be treated with medicines or surgery.  · Practice heart-healthy habits to manage this condition. These include limiting alcohol, avoiding nicotine and tobacco, and eating a balanced diet that is low in salt (sodium).  This information is not intended to replace advice given to you by your health care provider. Make sure you discuss any questions you have with your health care provider.  Document Released: 03/07/2006 Document Revised: 09/29/2017 Document Reviewed: 09/29/2017  [employer] Interactive Patient Education © 2019 [employer]

## 2019-10-15 ENCOUNTER — DOCUMENTATION (OUTPATIENT)
Dept: CARDIOLOGY | Facility: CLINIC | Age: 39
End: 2019-10-15

## 2019-10-15 ENCOUNTER — HOSPITAL ENCOUNTER (OUTPATIENT)
Dept: CARDIOLOGY | Facility: HOSPITAL | Age: 39
Discharge: HOME | End: 2019-10-15
Admitting: INTERNAL MEDICINE

## 2019-10-15 VITALS — WEIGHT: 213.4 LBS | BODY MASS INDEX: 33.42 KG/M2

## 2019-10-15 DIAGNOSIS — R07.89 CHEST PAIN, ATYPICAL: Primary | ICD-10-CM

## 2019-10-15 LAB
BH CV ECHO MEAS - BSA(HAYCOCK): 2.2 M^2
BH CV ECHO MEAS - BSA: 2.1 M^2
BH CV ECHO MEAS - BZI_BMI: 34.1 KILOGRAMS/M^2
BH CV ECHO MEAS - BZI_METRIC_HEIGHT: 170.2 CM
BH CV ECHO MEAS - BZI_METRIC_WEIGHT: 98.9 KG
BH CV STRESS BP STAGE 1: NORMAL
BH CV STRESS BP STAGE 2: NORMAL
BH CV STRESS BP STAGE 3: NORMAL
BH CV STRESS DOSE DOBUTAMINE STAGE 1: 10
BH CV STRESS DOSE DOBUTAMINE STAGE 2: 20
BH CV STRESS DOSE DOBUTAMINE STAGE 3: 30
BH CV STRESS DURATION MIN STAGE 1: 3
BH CV STRESS DURATION MIN STAGE 2: 3
BH CV STRESS DURATION MIN STAGE 3: 2
BH CV STRESS DURATION SEC STAGE 1: 0
BH CV STRESS DURATION SEC STAGE 2: 0
BH CV STRESS DURATION SEC STAGE 3: 37
BH CV STRESS ECHO POST STRESS EJECTION FRACTION EF: 70 %
BH CV STRESS HR STAGE 1: 75
BH CV STRESS HR STAGE 2: 78
BH CV STRESS HR STAGE 3: 146
BH CV STRESS PROTOCOL 1: NORMAL
BH CV STRESS RECOVERY BP: NORMAL MMHG
BH CV STRESS RECOVERY HR: 100 BPM
BH CV STRESS STAGE 1: 1
BH CV STRESS STAGE 2: 2
BH CV STRESS STAGE 3: 3
MAXIMAL PREDICTED HEART RATE: 179 BPM
PERCENT MAX PREDICTED HR: 82.68 %
STRESS BASELINE BP: NORMAL MMHG
STRESS BASELINE HR: 77 BPM
STRESS PERCENT HR: 97 %
STRESS POST ESTIMATED WORKLOAD: 1 METS
STRESS POST EXERCISE DUR MIN: 8 MIN
STRESS POST EXERCISE DUR SEC: 36 SEC
STRESS POST PEAK BP: NORMAL MMHG
STRESS POST PEAK HR: 148 BPM
STRESS TARGET HR: 152 BPM

## 2019-10-15 PROCEDURE — 93320 DOPPLER ECHO COMPLETE: CPT

## 2019-10-15 PROCEDURE — 93017 CV STRESS TEST TRACING ONLY: CPT

## 2019-10-15 PROCEDURE — 93350 STRESS TTE ONLY: CPT

## 2019-10-15 PROCEDURE — 93325 DOPPLER ECHO COLOR FLOW MAPG: CPT

## 2019-10-15 PROCEDURE — 25010000002 DOBUTAMINE: Performed by: INTERNAL MEDICINE

## 2019-10-15 PROCEDURE — 93351 STRESS TTE COMPLETE: CPT | Performed by: INTERNAL MEDICINE

## 2019-10-15 PROCEDURE — 25010000002 ATROPINE PER 0.01 MG: Performed by: INTERNAL MEDICINE

## 2019-10-15 RX ORDER — ATROPINE SULFATE 1 MG/ML
0.8 INJECTION, SOLUTION INTRAMUSCULAR; INTRAVENOUS; SUBCUTANEOUS ONCE
Status: COMPLETED | OUTPATIENT
Start: 2019-10-15 | End: 2019-10-15

## 2019-10-15 RX ADMIN — DOBUTAMINE 10 MCG/KG/MIN: 12.5 INJECTION, SOLUTION, CONCENTRATE INTRAVENOUS at 11:19

## 2019-10-15 RX ADMIN — ATROPINE SULFATE 0.8 MG: 1 INJECTION, SOLUTION INTRAMUSCULAR; INTRAVENOUS; SUBCUTANEOUS at 11:20

## 2019-10-15 NOTE — PROGRESS NOTES
Stress test scheduled. Patient given date, time, and instructions. Patient verbalized understanding

## 2019-10-23 ENCOUNTER — HOSPITAL ENCOUNTER (OUTPATIENT)
Dept: NUCLEAR MEDICINE | Facility: HOSPITAL | Age: 39
Discharge: HOME | End: 2019-10-23

## 2019-10-23 PROCEDURE — A9500 TC99M SESTAMIBI: HCPCS | Performed by: INTERNAL MEDICINE

## 2019-10-23 PROCEDURE — 0 TECHNETIUM SESTAMIBI: Performed by: INTERNAL MEDICINE

## 2019-10-23 PROCEDURE — 78452 HT MUSCLE IMAGE SPECT MULT: CPT

## 2019-10-23 PROCEDURE — 93017 CV STRESS TEST TRACING ONLY: CPT

## 2019-10-23 RX ADMIN — TECHNETIUM TC 99M SESTAMIBI 1 DOSE: 1 INJECTION INTRAVENOUS at 08:26

## 2019-10-24 ENCOUNTER — LAB (OUTPATIENT)
Dept: LAB | Facility: HOSPITAL | Age: 39
End: 2019-10-24

## 2019-10-24 ENCOUNTER — HOSPITAL ENCOUNTER (OUTPATIENT)
Dept: NUCLEAR MEDICINE | Facility: HOSPITAL | Age: 39
Discharge: HOME | End: 2019-10-24

## 2019-10-24 ENCOUNTER — HOSPITAL ENCOUNTER (OUTPATIENT)
Dept: CARDIOLOGY | Facility: HOSPITAL | Age: 39
Discharge: HOME | End: 2019-10-24

## 2019-10-24 ENCOUNTER — OFFICE VISIT (OUTPATIENT)
Dept: FAMILY MEDICINE CLINIC | Facility: CLINIC | Age: 39
End: 2019-10-24

## 2019-10-24 VITALS
HEIGHT: 67 IN | OXYGEN SATURATION: 99 % | SYSTOLIC BLOOD PRESSURE: 122 MMHG | WEIGHT: 215.06 LBS | BODY MASS INDEX: 33.75 KG/M2 | HEART RATE: 74 BPM | DIASTOLIC BLOOD PRESSURE: 70 MMHG

## 2019-10-24 DIAGNOSIS — I10 ESSENTIAL HYPERTENSION: ICD-10-CM

## 2019-10-24 DIAGNOSIS — G40.B09 NONINTRACTABLE JUVENILE MYOCLONIC EPILEPSY WITHOUT STATUS EPILEPTICUS (HCC): ICD-10-CM

## 2019-10-24 DIAGNOSIS — I10 ESSENTIAL HYPERTENSION: Primary | ICD-10-CM

## 2019-10-24 DIAGNOSIS — Z23 NEEDS FLU SHOT: ICD-10-CM

## 2019-10-24 PROBLEM — F99 MENTAL DISORDER: Status: ACTIVE | Noted: 2019-03-20

## 2019-10-24 LAB
BH CV STRESS BP STAGE 1: NORMAL
BH CV STRESS COMMENTS STAGE 1: NORMAL
BH CV STRESS DOSE REGADENOSON STAGE 1: 0.4
BH CV STRESS DURATION MIN STAGE 1: 0
BH CV STRESS DURATION SEC STAGE 1: 10
BH CV STRESS HR STAGE 1: 105
BH CV STRESS PROTOCOL 1: NORMAL
BH CV STRESS RECOVERY BP: NORMAL MMHG
BH CV STRESS RECOVERY HR: 100 BPM
BH CV STRESS STAGE 1: 1
LV EF NUC BP: 69 %
MAXIMAL PREDICTED HEART RATE: 179 BPM
PERCENT MAX PREDICTED HR: 60.89 %
STRESS BASELINE BP: NORMAL MMHG
STRESS BASELINE HR: 75 BPM
STRESS PERCENT HR: 72 %
STRESS POST ESTIMATED WORKLOAD: 1 METS
STRESS POST PEAK BP: NORMAL MMHG
STRESS POST PEAK HR: 109 BPM
STRESS TARGET HR: 152 BPM

## 2019-10-24 PROCEDURE — 80156 ASSAY CARBAMAZEPINE TOTAL: CPT

## 2019-10-24 PROCEDURE — 99214 OFFICE O/P EST MOD 30 MIN: CPT | Performed by: FAMILY MEDICINE

## 2019-10-24 PROCEDURE — 93018 CV STRESS TEST I&R ONLY: CPT | Performed by: INTERNAL MEDICINE

## 2019-10-24 PROCEDURE — 80053 COMPREHEN METABOLIC PANEL: CPT

## 2019-10-24 PROCEDURE — 85025 COMPLETE CBC W/AUTO DIFF WBC: CPT

## 2019-10-24 PROCEDURE — 90686 IIV4 VACC NO PRSV 0.5 ML IM: CPT | Performed by: FAMILY MEDICINE

## 2019-10-24 PROCEDURE — G0008 ADMIN INFLUENZA VIRUS VAC: HCPCS | Performed by: FAMILY MEDICINE

## 2019-10-24 PROCEDURE — 36415 COLL VENOUS BLD VENIPUNCTURE: CPT

## 2019-10-24 PROCEDURE — A9500 TC99M SESTAMIBI: HCPCS | Performed by: INTERNAL MEDICINE

## 2019-10-24 PROCEDURE — 25010000002 REGADENOSON 0.4 MG/5ML SOLUTION: Performed by: INTERNAL MEDICINE

## 2019-10-24 PROCEDURE — 93016 CV STRESS TEST SUPVJ ONLY: CPT | Performed by: INTERNAL MEDICINE

## 2019-10-24 PROCEDURE — 0 TECHNETIUM SESTAMIBI: Performed by: INTERNAL MEDICINE

## 2019-10-24 PROCEDURE — 78452 HT MUSCLE IMAGE SPECT MULT: CPT | Performed by: INTERNAL MEDICINE

## 2019-10-24 RX ORDER — 0.9 % SODIUM CHLORIDE 0.9 %
10 VIAL (ML) INJECTION ONCE
Status: COMPLETED | OUTPATIENT
Start: 2019-10-24 | End: 2019-10-24

## 2019-10-24 RX ADMIN — REGADENOSON 0.4 MG: 0.08 INJECTION, SOLUTION INTRAVENOUS at 08:39

## 2019-10-24 RX ADMIN — SODIUM CHLORIDE, PRESERVATIVE FREE 10 ML: 5 INJECTION INTRAVENOUS at 08:39

## 2019-10-24 RX ADMIN — TECHNETIUM TC 99M SESTAMIBI 1 DOSE: 1 INJECTION INTRAVENOUS at 08:39

## 2019-10-24 NOTE — PROGRESS NOTES
Subjective   Nicholas Duane Stovall is a 39 y.o. male.    cc: follow up for chronic medical issues  History of Present Illness The patient comes in for check of their chronic medical issues which include Hypertension and Epilepsy. He is doing will. He needs a flu shot. .       The following portions of the patient's history were reviewed and updated as appropriate: allergies, current medications, past family history, past medical history, past social history, past surgical history and problem list.    Review of Systems   Constitutional: Negative for fatigue and fever.   Respiratory: Negative for cough, chest tightness and stridor.    Cardiovascular: Negative for chest pain, palpitations and leg swelling.       Objective   Physical Exam   Constitutional: He is oriented to person, place, and time. He appears well-developed and well-nourished.   HENT:   Head: Normocephalic and atraumatic.   Right Ear: External ear normal.   Left Ear: External ear normal.   Nose: Nose normal.   Mouth/Throat: Oropharynx is clear and moist.   Eyes: Conjunctivae are normal.   Neck: Normal range of motion. Neck supple.   Cardiovascular: Normal rate, regular rhythm and normal heart sounds. Exam reveals no gallop and no friction rub.   No murmur heard.  Pulmonary/Chest: Effort normal and breath sounds normal. No stridor. No respiratory distress. He has no wheezes. He has no rales.   Abdominal: Soft. Bowel sounds are normal. He exhibits no distension and no mass. There is no tenderness. There is no guarding.   Neurological: He is alert and oriented to person, place, and time.   Skin: Skin is warm and dry.   Vitals reviewed.        Assessment/Plan   Moshe was seen today for follow-up and hypertension.    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  Will contact with results as needed.

## 2019-10-25 LAB
ALBUMIN SERPL-MCNC: 4.9 G/DL (ref 3.5–5.2)
ALBUMIN/GLOB SERPL: 2.9 G/DL
ALP SERPL-CCNC: 91 U/L (ref 39–117)
ALT SERPL W P-5'-P-CCNC: 51 U/L (ref 1–41)
ANION GAP SERPL CALCULATED.3IONS-SCNC: 12.3 MMOL/L (ref 5–15)
AST SERPL-CCNC: 47 U/L (ref 1–40)
BASOPHILS # BLD AUTO: 0.07 10*3/MM3 (ref 0–0.2)
BASOPHILS NFR BLD AUTO: 1.2 % (ref 0–1.5)
BILIRUB SERPL-MCNC: 0.3 MG/DL (ref 0.2–1.2)
BUN BLD-MCNC: 13 MG/DL (ref 6–20)
BUN/CREAT SERPL: 16.3 (ref 7–25)
CALCIUM SPEC-SCNC: 8.9 MG/DL (ref 8.6–10.5)
CARBAMAZEPINE SERPL-MCNC: 4.7 MCG/ML (ref 4–12)
CHLORIDE SERPL-SCNC: 100 MMOL/L (ref 98–107)
CO2 SERPL-SCNC: 28.7 MMOL/L (ref 22–29)
CREAT BLD-MCNC: 0.8 MG/DL (ref 0.76–1.27)
DEPRECATED RDW RBC AUTO: 42.7 FL (ref 37–54)
EOSINOPHIL # BLD AUTO: 0.06 10*3/MM3 (ref 0–0.4)
EOSINOPHIL NFR BLD AUTO: 1 % (ref 0.3–6.2)
ERYTHROCYTE [DISTWIDTH] IN BLOOD BY AUTOMATED COUNT: 13.8 % (ref 12.3–15.4)
GFR SERPL CREATININE-BSD FRML MDRD: 107 ML/MIN/1.73
GLOBULIN UR ELPH-MCNC: 1.7 GM/DL
GLUCOSE BLD-MCNC: 86 MG/DL (ref 65–99)
HCT VFR BLD AUTO: 39.4 % (ref 37.5–51)
HGB BLD-MCNC: 13.4 G/DL (ref 13–17.7)
IMM GRANULOCYTES # BLD AUTO: 0.03 10*3/MM3 (ref 0–0.05)
IMM GRANULOCYTES NFR BLD AUTO: 0.5 % (ref 0–0.5)
LYMPHOCYTES # BLD AUTO: 2.2 10*3/MM3 (ref 0.7–3.1)
LYMPHOCYTES NFR BLD AUTO: 37 % (ref 19.6–45.3)
MCH RBC QN AUTO: 28.8 PG (ref 26.6–33)
MCHC RBC AUTO-ENTMCNC: 34 G/DL (ref 31.5–35.7)
MCV RBC AUTO: 84.7 FL (ref 79–97)
MONOCYTES # BLD AUTO: 0.62 10*3/MM3 (ref 0.1–0.9)
MONOCYTES NFR BLD AUTO: 10.4 % (ref 5–12)
NEUTROPHILS # BLD AUTO: 2.97 10*3/MM3 (ref 1.7–7)
NEUTROPHILS NFR BLD AUTO: 49.9 % (ref 42.7–76)
NRBC BLD AUTO-RTO: 0 /100 WBC (ref 0–0.2)
PLATELET # BLD AUTO: 174 10*3/MM3 (ref 140–450)
PMV BLD AUTO: 8.7 FL (ref 6–12)
POTASSIUM BLD-SCNC: 4.4 MMOL/L (ref 3.5–5.2)
PROT SERPL-MCNC: 6.6 G/DL (ref 6–8.5)
RBC # BLD AUTO: 4.65 10*6/MM3 (ref 4.14–5.8)
SODIUM BLD-SCNC: 141 MMOL/L (ref 136–145)
WBC NRBC COR # BLD: 5.95 10*3/MM3 (ref 3.4–10.8)

## 2019-10-28 ENCOUNTER — OFFICE VISIT (OUTPATIENT)
Dept: CARDIOLOGY | Facility: CLINIC | Age: 39
End: 2019-10-28

## 2019-10-28 VITALS
OXYGEN SATURATION: 99 % | WEIGHT: 214.9 LBS | SYSTOLIC BLOOD PRESSURE: 116 MMHG | HEIGHT: 67 IN | DIASTOLIC BLOOD PRESSURE: 66 MMHG | BODY MASS INDEX: 33.73 KG/M2 | HEART RATE: 71 BPM

## 2019-10-28 DIAGNOSIS — E66.09 CLASS 2 OBESITY DUE TO EXCESS CALORIES WITHOUT SERIOUS COMORBIDITY WITH BODY MASS INDEX (BMI) OF 35.0 TO 35.9 IN ADULT: ICD-10-CM

## 2019-10-28 DIAGNOSIS — I31.39 PERICARDIAL EFFUSION: ICD-10-CM

## 2019-10-28 DIAGNOSIS — Q20.8 ABNORMALITY OF RIGHT VENTRICLE OF HEART: ICD-10-CM

## 2019-10-28 DIAGNOSIS — I34.0 NON-RHEUMATIC MITRAL REGURGITATION: Primary | ICD-10-CM

## 2019-10-28 PROCEDURE — 99214 OFFICE O/P EST MOD 30 MIN: CPT | Performed by: INTERNAL MEDICINE

## 2019-10-28 NOTE — PATIENT INSTRUCTIONS
Pericardial Effusion    Pericardial effusion is a buildup of fluid around the heart. The heart is surrounded by a thin, double-layered sac (pericardium). When fluid builds up in this sac, it can put pressure on the heart and cause problems.  When fluid builds up in the pericardial sac and pressure on the heart increases, it becomes harder for the heart to pump blood. The fluid can prevent the heart from pumping enough blood (cardiac tamponade). This can be life-threatening.  What are the causes?  Often, the cause of pericardial effusion is not known (idiopathic effusion). In some cases, the condition may be caused by:  · Infections from a virus, fungus, parasite, or bacteria.  · Damage to the pericardium from heart surgery or a heart attack.  · Inflammatory diseases, such as rheumatoid arthritis or lupus.  · Kidney disease.  · Thyroid disease.  · Cancer or treatment for cancer, including radiation or chemotherapy.  · Certain medicines, including medicines for tuberculosis or seizures.  · Chest injury.  What are the signs or symptoms?  Pericardial effusion may not cause symptoms at first, especially if the fluid builds up slowly. In time, pressure on the heart may cause:  · Chest pain.  · Trouble with breathing.  · Pain and shortness of breath that get worse when lying down.  · Dizziness.  · Fainting.  · Cough.  · Hiccups.  · Skipped heartbeats (palpitations).  · Anxiety and confusion.  · A bluish skin color (cyanosis).  · Swollen legs and ankles.  · A feeling of fullness in the chest.  How is this diagnosed?  This condition is diagnosed based on your symptoms and testing, which may include:  · A test that creates ultrasound images of your heart (echocardiogram).  · A test to examine the electrical functions of your heart (electrocardiogram, ECG).  · Chest X-ray.  · CT scan.  · MRI.  · Blood tests.  How is this treated?  Treatment for this condition depends on the cause of your condition and how severe your symptoms  are. Treatment may include:  · Medicines, such as:  ? NSAIDs or other anti-inflammatory medicines such as steroids.  ? Antibiotic medicine.  ? Antifungal medicine.  · Hospital treatment. This may be necessary for cardiac tamponade. Treatment in the hospital may include:  ? IV fluids.  ? Breathing support.  · Surgery. This may be needed in severe cases. Surgery may include:  ? A procedure to remove fluid from the pericardium by placing a needle into it (pericardiocentesis).  ? A procedure to make a permanent opening in the pericardium (pericardial window).  ? Open heart surgery.  Follow these instructions at home:  · Take over-the-counter and prescription medicines only as told by your health care provider.  · If you were prescribed antibiotic medicine, take it as told by your health care provider. Do not stop taking the antibiotic even if you start to feel better.  · Rest as told by your health care provider. Ask your health care provider what activities are safe for you.  · Keep all follow-up visits as told by your health care provider. This is important.  Contact a health care provider if:  · You have a cough or hiccups that do not go away.  · You have severe swelling in your legs or ankles.  Get help right away if:  · You have fast or irregular heartbeats (palpitations).  · You feel dizzy or light-headed.  · You faint.  · You have chest pain.  · You have trouble breathing.  These symptoms may represent a serious problem that is an emergency. Do not wait to see if the symptoms will go away. Get medical help right away. Call your local emergency services (911 in the [place] Do not drive yourself to the hospital.  Summary  · Pericardial effusion is a buildup of fluid around the heart. The fluid can eventually prevent the heart from pumping enough blood (cardiac tamponade), which can be life-threatening.  · Pericardial effusion may not cause symptoms at first.  · Treatment for pericardial effusion depends on the cause  of your condition and how severe your symptoms are. In severe cases, hospital treatment or surgery may be required.  · Rest as told by your health care provider. Ask your health care provider what activities are safe for you.  This information is not intended to replace advice given to you by your health care provider. Make sure you discuss any questions you have with your health care provider.  Document Released: 08/15/2006 Document Revised: 01/25/2018 Document Reviewed: 01/25/2018  [employer] Interactive Patient Education © 2019 [employer]

## 2019-10-28 NOTE — PROGRESS NOTES
Cardiovascular Medicine   Nick Lao M.D., Ph.D., [facility]      The patient returns to cardiovascular clinic for follow-up of the following:    PROBLEM LIST:  1.  RV dilatation without evidence of ASD  2.  Mitral regurgitation  3. Pericardial Effusion  4.  HEAVEN on CPAP    Nicholas Duane Stovall is a 39 y.o. male who returns to clinic today in follow-up.  He returns today in follow for his right ventricular dilatation.  He has had interval 2-D echoes showing no evidence of interval right ventricular dilatation.  No evidence of pulmonary hypertension.  No evidence of ASD.  He continues with stable dyspnea.  He does have HEVAEN for which he is on CPAP.    His last 2-D TTE showed a mild degree of mitral regurgitation.  He remains asymptomatic.  He denies palpitations.  No dizziness, lightheadedness or syncope.  At his last visit he was reporting some atypical chest pain symptoms.  He was sent for a DSE, which was sub-target.  He then was sent for a MPS.  He returns today for these results.  Fortunately, he is had no further resting, exertional or nocturnal angina. He now believes he had spicy food the night prior and has blamed this on indigestion.     Review of Systems   Cardiovascular: Negative for chest pain, claudication, cyanosis, irregular heartbeat, leg swelling, near-syncope, orthopnea, palpitations, paroxysmal nocturnal dyspnea and syncope.   Respiratory: Negative for cough, hemoptysis and shortness of breath.      family history includes Breast cancer in an other family member; Cancer in his maternal grandmother and mother; Diabetes in an other family member; Heart disease in an other family member; Hyperlipidemia in his mother; Hypertension in his mother; No Known Problems in his father; Other in his mother; Rheum arthritis in his mother; Stroke in his maternal grandmother and mother.   reports that he has never smoked. He has never used smokeless tobacco. He reports that he does not drink alcohol or  use drugs.  Allergies   Allergen Reactions   • Abilify [Aripiprazole] Confusion and Hallucinations   • Lexapro [Escitalopram] Other (See Comments)     Other reaction(s): JERKING   • Perphenazine Other (See Comments)     Sleep walking   • Depakote [Valproic Acid] Other (See Comments)     Other reaction(s): Other (See Comments)  Other reaction(s): TREMORS  Other reaction(s): TREMORS   • Mirtazapine Other (See Comments)     Other reaction(s): Other (See Comments)  Other reaction(s): JERKING IN SLEEP  Other reaction(s): JERKING IN SLEEP   • Phenobarbital Confusion and Hallucinations     Hyper, Hallucination       Current Outpatient Medications:   •  amLODIPine (NORVASC) 10 MG tablet, TAKE ONE TABLET BY MOUTH EVERY DAY, Disp: 30 tablet, Rfl: 5  •  ARIPiprazole (ABILIFY) 10 MG tablet, Take 10 mg by mouth Daily., Disp: , Rfl:   •  busPIRone (BUSPAR) 10 MG tablet, Take 10 mg by mouth 2 (Two) Times a Day., Disp: , Rfl:   •  carBAMazepine XR (TEGRETOL-XR) 200 MG 12 hr tablet, Take 200 mg by mouth 2 (two) times a day. Med Name: Tegretol  mg tablet,extended release; Indication: seizures, Disp: , Rfl:   •  citalopram (CELEXA) 20 MG tablet, Take 40 mg by mouth daily. Indication: depression, Disp: , Rfl:   •  cyclobenzaprine (FLEXERIL) 5 MG tablet, TAKE 1 TABLET BY MOUTH THREE TIMES A DAY AS NEEDED FOR MUSCLE SPASMS, Disp: 21 tablet, Rfl: 1  •  divalproex (DEPAKOTE) 500 MG DR tablet, Take 500 mg by mouth 3 (Three) Times a Day., Disp: , Rfl:   •  escitalopram (LEXAPRO) 20 MG tablet, Take 20 mg by mouth Daily., Disp: , Rfl:   •  levETIRAcetam (KEPPRA) 500 MG tablet, Take  by mouth Daily. 3 tabs AM, 3 tabs PM; Indication: seizures, Disp: , Rfl:   •  metoprolol tartrate (LOPRESSOR) 50 MG tablet, TAKE ONE TABLET BY MOUTH TWO TIMES A DAY, Disp: 60 tablet, Rfl: 5  •  minocycline (MINOCIN,DYNACIN) 100 MG capsule, Take 100 mg by mouth Daily., Disp: , Rfl:   •  Multiple Vitamins-Minerals (MULTIVITAMIN ADULT PO), Take 1 tablet by mouth  2 (Two) Times a Day., Disp: , Rfl:   •  perphenazine (TRILAFON) 2 MG tablet, Take 1 tablet by mouth Daily., Disp: , Rfl: 0  •  PHENobarbital (LUMINAL) 100 MG tablet, Take 100 mg by mouth 2 (Two) Times a Day., Disp: , Rfl:   •  primidone (MYSOLINE) 50 MG tablet, Take 3 tablets by mouth 2 (Two) Times a Day. 1 tablet in the AM, 2 tabs in the PM, Disp: , Rfl:   •  risperiDONE (RISPERDAL) 4 MG tablet, TAKE 2 MG IN THE MORNING AND 6 MG IN THE EVENING, Disp: , Rfl:     Physical Exam:  Vitals:    10/28/19 1302   BP: 116/66   Pulse: 71   SpO2: 99%     Body mass index is 33.66 kg/m².   Last Weights:   Wt Readings from Last 3 Encounters:   10/24/19 97.6 kg (215 lb 1 oz)   10/15/19 96.8 kg (213 lb 6.4 oz)   10/03/19 99.3 kg (218 lb 14.4 oz)     Pulse Ox: Normal   General: alert, appears stated age and cooperative  Body Habitus: obese  HEENT: Head: Normocephalic, no lesions, without obvious abnormality. No arcus senilis, xanthelasma or xanthomas.  JVP: 6 cm of water at 45 degrees   Volume/Pulsation: Normal  Heart rate: normal    Heart Rhythm: regular     Heart Sounds: S1: normal intensity  S2: normal intensity  S3: absent   S4: absent  Opening Snap: absent  A2-OS:  absent.   Pericardial rub: absent    Ejection click: None      Murmurs:  absent   Extremity: moves all extremities equally.       DATA REVIEWED:             TTE/NIKO:  Results for orders placed in visit on 10/03/19   Adult Stress Echo W/ Cont or Stress Agent if Necessary Per Protocol    Narrative · Resting left ventricle systolic function is normal and estimated at   56-60%.  · Trace pericardial effusion adjacent to the left ventricle.  · Target heart rate not achieved. Patient achieved 82% of the maximal,   age-predicted heart rate  · Stress LVEF was hyperdynamic at greater than 70%.  · No overt evidence of inducible ischemia at sub--target heart rate.                 --------------------------------------------------------------------------------------------------  LABS:   Lab Results   Component Value Date    GLUCOSE 86 10/24/2019    BUN 13 10/24/2019    CREATININE 0.80 10/24/2019    EGFRIFNONA 107 10/24/2019    BCR 16.3 10/24/2019    K 4.4 10/24/2019    CO2 28.7 10/24/2019    CALCIUM 8.9 10/24/2019    ALBUMIN 4.90 10/24/2019    LABIL2 2.5 09/25/2019    AST 47 (H) 10/24/2019    ALT 51 (H) 10/24/2019     Lab Results   Component Value Date    WBC 5.95 10/24/2019    HGB 13.4 10/24/2019    HCT 39.4 10/24/2019    MCV 84.7 10/24/2019     10/24/2019     Lab Results   Component Value Date    CHLPL 169 01/13/2017    TRIG 172 01/13/2017    HDL 30 (L) 01/13/2017     01/13/2017     No results found for: TSH, B0XDGOA, I3WGBWQ, THYROIDAB  No results found for: CKTOTAL, CKMB, CKMBINDEX, TROPONINI, TROPONINT  No results found for: HGBA1C  No results found for: DDIMER  Lab Results   Component Value Date    ALT 51 (H) 10/24/2019     No results found for: HGBA1C  Lab Results   Component Value Date    CREATININE 0.80 10/24/2019     No results found for: IRON, TIBC, FERRITIN  Lab Results   Component Value Date    INR 0.9 02/09/2016    INR 0.9 12/17/2015    PROTIME 11.9 02/09/2016    PROTIME 12.5 12/17/2015       Assessment/Plan     1. Chest pain, atypical.  He has had no further chest pain.  I reviewed the NPV of his most recent low-risk MPS.  I do believe the basal segment is an artifact.  I have encouraged him to contact me for any other concerning symptoms.  Otherwise, he will continue follow-up with his PCP for management of his medical comorbidities.    2. Nonrheumatic mitral valve regurgitation. ACC stage B.  There are no surgical indications at this time.  · The patient has been advised to remain in clinical surveillance every 12 months.  · Signs and symptoms of worsening valve disease discussed.  I've asked the patient contact me for an earlier appointment if these  develop.  · I've recommended a repeat 2D TTE every 3 years.  · TTE due: 2020.  No indication based on 2017 ACC/AHA guidelines for IE prophylaxis for dental procedures: Optimal oral health is recommended through regular professional dental care and the use of appropriate dental products, such as manual, powered, and ultrasonic toothbrushes; dental floss; and other plaque-removal devices    3. Abnormality of right ventricle of heart  -Monitor    4. Cardiac Risk Assessments based on 2019 ACCF guidelines:  · A team-based care approach is recommended for the control of risk factors associated with ASCAD.  As such, Nicholas Duane Stovall was requested to have ongoing follow-up with their PCP.  · Continue follow-up visits with PCP for monitoring of labs; diet emphasizing intake of vegetables, fruits, nuts, whole grains and fish is recommended.  · Physical activity recommendations were provided.  · Essential HTN is a significant risk factor for stroke, heart disease and vascular disease. I've recommended the patient continue current medications, if any, as prescribed by the primary care provider. I recommended they have close follow-up for ongoing mgmt of this and the medical comorbidities associated with HTN with their PCP.  They were also provided with information regarding maintaining a healthy weight, heart-healthy dietary pattern DASH information.  Goal blood pressure less than 130/80.   · The patient's BMI is recommended to be calculated at least annually.  The patient's BMI is There is no height or weight on file to calculate BMI..  This places the patient in weight class:  Obese Class I: 30-34.9kg/m2.   Hand out, increase aerobic activity  cut out extra servings, decrease soda or juice intake, eat breakfast, eat more fruits and vegetables, family to eat at dinner table more often, have 3 meals a day, increase physical activity, increase water intake, keep TV off during meals, plan meals, reduce fast food intake,  reduce portion size and reduce screen time; close PCP follow-up.  · Tobacco status assessed at every visits.  The patient's nicotine status: has never smoked    5.  Pericardial effusion. The prevalence of pericardial effusion is about 3%.  The patient is currently Asymptomatic.  The size of the effusion is small.  I briefly discussed the etiology of pericardial effusions.  A handout was also offered to the patient which explained the disease process, including signs of worsening pericardial effusion.  At this point I have counseled conservative management.   -Will repeat a 2D TTE prior to next appt  -Please call for worsening signs or symptoms.    Return in about 6 months (around 4/28/2020).

## 2019-10-31 RX ORDER — AMLODIPINE BESYLATE 10 MG/1
TABLET ORAL
Qty: 30 TABLET | Refills: 5 | Status: SHIPPED | OUTPATIENT
Start: 2019-10-31 | End: 2020-06-03

## 2019-12-13 RX ORDER — CYCLOBENZAPRINE HCL 5 MG
TABLET ORAL
Qty: 21 TABLET | Refills: 1 | Status: SHIPPED | OUTPATIENT
Start: 2019-12-13 | End: 2020-01-24 | Stop reason: SDUPTHER

## 2020-01-22 ENCOUNTER — OFFICE VISIT (OUTPATIENT)
Dept: SLEEP MEDICINE | Facility: HOSPITAL | Age: 40
End: 2020-01-22

## 2020-01-22 VITALS
BODY MASS INDEX: 34.69 KG/M2 | SYSTOLIC BLOOD PRESSURE: 114 MMHG | OXYGEN SATURATION: 98 % | DIASTOLIC BLOOD PRESSURE: 70 MMHG | WEIGHT: 221 LBS | HEART RATE: 71 BPM | HEIGHT: 67 IN

## 2020-01-22 DIAGNOSIS — G47.33 OBSTRUCTIVE SLEEP APNEA, ADULT: Primary | ICD-10-CM

## 2020-01-22 PROCEDURE — 99213 OFFICE O/P EST LOW 20 MIN: CPT | Performed by: NURSE PRACTITIONER

## 2020-01-22 NOTE — PROGRESS NOTES
Sleep Clinic Follow Up    Date: 2020  Primary Care Physician: Calos Greenberg MD    Last office visit: 2019 (I reviewed this note)    CC: Follow up: HEAVEN on CPAP      Interim History:  Since the last visit:    1) moderate HEAVEN -  Nicholas Duane Stovall has remained compliant with CPAP. He denies mask and machine issues, dry mouth, headaches, or pressures intolerance. He denies abnormal dreams, sleep paralysis, nasal congestion, URI sx. Has not been having any sleepwalking since medication changes.      Sleep Testin. PSG on 2010, AHI of 28.5   2. CPAP titration on same day recommended 8 cm H2O   3. Currently on 5-20 cm H2O    PAP Data:    Time frame: 2019-2020   Compliance 99.4 %  Average use on days used: 8 hrs 58 min  Percent of days with usage greater than or equal to 4 hours: 98.6%  PAP range : 5-20 cm H2O  Average 90% pressure: 14.6 cmH2O  Leak: 40 minutes  Average AHI 10.1 events/hr  Mask type: Full face mask  [facility] [place]    Bed time: 2100  Sleep latency: 30 minutes  Number of times awakens during the night: 1-2  Wake time: 0700  Estimated total sleep time at night: 10 hours  Caffeine intake: 5 cups of coffee, 2 cups of tea, and 2-3 sodas per day  Alcohol intake: 0 drinks per week  Nap time: very rare   Sleepiness with Driving: does not drive currently    [place] - 7    2) Patient denies RLS symptoms.     PMHx, FH, SH reviewed and pertinent changes are: Stopped perphenazine. Saw Cardiology - had stress test - negative.      REVIEW OF SYSTEMS:   Negative for chest pain, SOA, fever, chills, cough, N/V/D, abdominal pain.    Smoking:none      Exam:  Vitals:    20 0856   BP: 114/70   Pulse: 71   SpO2: 98%           20  0856   Weight: 100 kg (221 lb)     Body mass index is 34.61 kg/m². Patient's Body mass index is 34.61 kg/m². BMI is above normal parameters. Recommendations include: referral to primary care.      Gen:                No distress, conversant, pleasant,  appears stated age, alert, oriented  Eyes:               Anicteric sclera, moist conjunctiva, no lid lag                           PERRL, EOMI   Heent:             NC/AT                          Oropharynx clear                          Normal hearing  Lungs:             Normal effort, non-labored breathing                          Clear to auscultation bilaterally          CV:                  Normal S1/S2, no murmur                          No lower extremity edema  ABD:               Soft, rounded, non-distended                          Normal bowel sounds                    Psych:             Appropriate affect  Neuro:             CN 2-12 appear intact    Past Medical History:   Diagnosis Date   • Abnormal electrocardiogram     Abnormal electrocardiogram [ECG] [EKG]     • Abnormal result of cardiovascular function study     Abnormal result of other cardiovascular function study   • Acute bronchitis    • Allergic rhinitis    • Anemia    • Burn of mouth and pharynx    • Chronic depression    • Chronic undifferentiated schizophrenia (CMS/HCC)    • Common cold     Common cold - improving      • Contact dermatitis    • Cough    • Encounter for surgical aftercare following surgery of circulatory system     Encounter for surgical aftercare following surgery on the circulatory system - VNS Implant 2/16/16   • Epidermoid cyst    • Epilepsy (CMS/HCC)    • Epistaxis    • Essential hypertension    • History of echocardiogram 12/31/2015    Echocardiogram W/ color flow 03916 (1) - Mildly dilated left ventricle with normal function with Ef of 55-60%.Mildly dilated right ventricle with normal function. right ventricular EF of 50%.Diastolic function normal.No significant valvular regurg or stenosis.   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, intractable, with status epilepticus (CMS/HCC)      Localization-related (focal) (partial) idiopathic epilepsy and epileptic syndromes with seizures of localized onset, intractable, without status epilepticus   • Multiple acquired skin tags     Multiple skin tags - Chronically irritated   • Need for immunization against influenza     Needs influenza immunization      • Panic disorder    • Renal function test abnormal     Renal function tests abnormal   • Schizophrenia (CMS/HCC)     Schizophrenia, unspecified   • Sebaceous cyst    • Sleep apnea    • Snoring    • Tinea cruris    • Upper respiratory infection        Current Outpatient Medications:   •  amLODIPine (NORVASC) 10 MG tablet, TAKE ONE TABLET BY MOUTH EVERY DAY, Disp: 30 tablet, Rfl: 5  •  ARIPiprazole (ABILIFY) 10 MG tablet, Take 10 mg by mouth Daily., Disp: , Rfl:   •  busPIRone (BUSPAR) 10 MG tablet, Take 10 mg by mouth 2 (Two) Times a Day., Disp: , Rfl:   •  carBAMazepine XR (TEGRETOL-XR) 200 MG 12 hr tablet, Take 200 mg by mouth 2 (two) times a day. Med Name: Tegretol  mg tablet,extended release; Indication: seizures, Disp: , Rfl:   •  citalopram (CELEXA) 20 MG tablet, Take 40 mg by mouth daily. Indication: depression, Disp: , Rfl:   •  cyclobenzaprine (FLEXERIL) 5 MG tablet, TAKE 1 TABLET BY MOUTH THREE TIMES A DAY AS NEEDED FOR MUSCLE SPASMS, Disp: 21 tablet, Rfl: 1  •  divalproex (DEPAKOTE) 500 MG DR tablet, Take 500 mg by mouth 3 (Three) Times a Day., Disp: , Rfl:   •  escitalopram (LEXAPRO) 20 MG tablet, Take 20 mg by mouth Daily., Disp: , Rfl:   •  levETIRAcetam (KEPPRA) 500 MG tablet, Take  by mouth Daily. 3 tabs AM, 3 tabs PM; Indication: seizures, Disp: , Rfl:   •  metoprolol tartrate (LOPRESSOR) 50 MG tablet, TAKE ONE TABLET BY MOUTH TWO TIMES A DAY, Disp: 60 tablet, Rfl: 5  •  minocycline (MINOCIN,DYNACIN) 100 MG capsule, Take 100 mg by mouth Daily., Disp: , Rfl:   •  Multiple Vitamins-Minerals (MULTIVITAMIN ADULT PO), Take 1 tablet by mouth 2 (Two) Times a Day., Disp: , Rfl:   •   perphenazine (TRILAFON) 2 MG tablet, Take 1 tablet by mouth Daily., Disp: , Rfl: 0  •  PHENobarbital (LUMINAL) 100 MG tablet, Take 100 mg by mouth 2 (Two) Times a Day., Disp: , Rfl:   •  primidone (MYSOLINE) 50 MG tablet, Take 3 tablets by mouth 2 (Two) Times a Day. 3 nightly, Disp: , Rfl:   •  risperiDONE (RISPERDAL) 4 MG tablet, TAKE 2 MG IN THE MORNING AND 6 MG IN THE EVENING, Disp: , Rfl:       Assessment and Plan:    1. Obstructive sleep apnea Established, stable (1)  1. Compliant with PAP therapy  2. Continue PAP as prescribed  3. Script for PAP supplies  4. Change pressure to 13-20 cm H2O  5. Return to clinic in 1 year with compliance report unless change in symptoms in interim period  2. Schizophrenia - Chronic, stable        10 of 20 minutes spent face-to-face counseling patient extensively regarding:   PAP therapy, PAP compliance and PAP maintenance      RTC in 12 months. Patient agrees to return sooner if changes in symptoms.        This document has been electronically signed by RUBEN Vicente on January 22, 2020 9:06 AM          CC: Calos Greenberg MD          No ref. provider found

## 2020-01-24 ENCOUNTER — LAB (OUTPATIENT)
Dept: LAB | Facility: HOSPITAL | Age: 40
End: 2020-01-24

## 2020-01-24 ENCOUNTER — OFFICE VISIT (OUTPATIENT)
Dept: FAMILY MEDICINE CLINIC | Facility: CLINIC | Age: 40
End: 2020-01-24

## 2020-01-24 VITALS
HEIGHT: 67 IN | WEIGHT: 217.31 LBS | SYSTOLIC BLOOD PRESSURE: 124 MMHG | BODY MASS INDEX: 34.11 KG/M2 | OXYGEN SATURATION: 99 % | DIASTOLIC BLOOD PRESSURE: 76 MMHG | HEART RATE: 78 BPM

## 2020-01-24 DIAGNOSIS — I10 ESSENTIAL HYPERTENSION: Primary | ICD-10-CM

## 2020-01-24 DIAGNOSIS — G40.B09 NONINTRACTABLE JUVENILE MYOCLONIC EPILEPSY WITHOUT STATUS EPILEPTICUS (HCC): ICD-10-CM

## 2020-01-24 DIAGNOSIS — F20.9 SCHIZOPHRENIA, UNSPECIFIED TYPE (HCC): ICD-10-CM

## 2020-01-24 DIAGNOSIS — I10 ESSENTIAL HYPERTENSION: ICD-10-CM

## 2020-01-24 LAB
ALBUMIN SERPL-MCNC: 5 G/DL (ref 3.5–5.2)
ALBUMIN/GLOB SERPL: 2.8 G/DL
ALP SERPL-CCNC: 91 U/L (ref 39–117)
ALT SERPL W P-5'-P-CCNC: 33 U/L (ref 1–41)
ANION GAP SERPL CALCULATED.3IONS-SCNC: 12.1 MMOL/L (ref 5–15)
AST SERPL-CCNC: 27 U/L (ref 1–40)
BASOPHILS # BLD AUTO: 0.11 10*3/MM3 (ref 0–0.2)
BASOPHILS NFR BLD AUTO: 1.9 % (ref 0–1.5)
BILIRUB SERPL-MCNC: 0.3 MG/DL (ref 0.2–1.2)
BUN BLD-MCNC: 8 MG/DL (ref 6–20)
BUN/CREAT SERPL: 10.1 (ref 7–25)
CALCIUM SPEC-SCNC: 9.4 MG/DL (ref 8.6–10.5)
CHLORIDE SERPL-SCNC: 99 MMOL/L (ref 98–107)
CHOLEST SERPL-MCNC: 177 MG/DL (ref 0–200)
CO2 SERPL-SCNC: 28.9 MMOL/L (ref 22–29)
CREAT BLD-MCNC: 0.79 MG/DL (ref 0.76–1.27)
DEPRECATED RDW RBC AUTO: 41.1 FL (ref 37–54)
EOSINOPHIL # BLD AUTO: 0.09 10*3/MM3 (ref 0–0.4)
EOSINOPHIL NFR BLD AUTO: 1.6 % (ref 0.3–6.2)
ERYTHROCYTE [DISTWIDTH] IN BLOOD BY AUTOMATED COUNT: 13.4 % (ref 12.3–15.4)
GFR SERPL CREATININE-BSD FRML MDRD: 108 ML/MIN/1.73
GLOBULIN UR ELPH-MCNC: 1.8 GM/DL
GLUCOSE BLD-MCNC: 87 MG/DL (ref 65–99)
HCT VFR BLD AUTO: 39.4 % (ref 37.5–51)
HDLC SERPL-MCNC: 37 MG/DL (ref 40–60)
HGB BLD-MCNC: 13.6 G/DL (ref 13–17.7)
IMM GRANULOCYTES # BLD AUTO: 0.02 10*3/MM3 (ref 0–0.05)
IMM GRANULOCYTES NFR BLD AUTO: 0.3 % (ref 0–0.5)
LDLC SERPL CALC-MCNC: 112 MG/DL (ref 0–100)
LDLC/HDLC SERPL: 3.03 {RATIO}
LYMPHOCYTES # BLD AUTO: 2.51 10*3/MM3 (ref 0.7–3.1)
LYMPHOCYTES NFR BLD AUTO: 43.7 % (ref 19.6–45.3)
MCH RBC QN AUTO: 29.2 PG (ref 26.6–33)
MCHC RBC AUTO-ENTMCNC: 34.5 G/DL (ref 31.5–35.7)
MCV RBC AUTO: 84.7 FL (ref 79–97)
MONOCYTES # BLD AUTO: 0.51 10*3/MM3 (ref 0.1–0.9)
MONOCYTES NFR BLD AUTO: 8.9 % (ref 5–12)
NEUTROPHILS # BLD AUTO: 2.51 10*3/MM3 (ref 1.7–7)
NEUTROPHILS NFR BLD AUTO: 43.6 % (ref 42.7–76)
NRBC BLD AUTO-RTO: 0.2 /100 WBC (ref 0–0.2)
PLATELET # BLD AUTO: 180 10*3/MM3 (ref 140–450)
PMV BLD AUTO: 8.6 FL (ref 6–12)
POTASSIUM BLD-SCNC: 4.2 MMOL/L (ref 3.5–5.2)
PROT SERPL-MCNC: 6.8 G/DL (ref 6–8.5)
RBC # BLD AUTO: 4.65 10*6/MM3 (ref 4.14–5.8)
SODIUM BLD-SCNC: 140 MMOL/L (ref 136–145)
TRIGL SERPL-MCNC: 139 MG/DL (ref 0–150)
VLDLC SERPL-MCNC: 27.8 MG/DL (ref 5–40)
WBC NRBC COR # BLD: 5.75 10*3/MM3 (ref 3.4–10.8)

## 2020-01-24 PROCEDURE — 80061 LIPID PANEL: CPT

## 2020-01-24 PROCEDURE — 80053 COMPREHEN METABOLIC PANEL: CPT

## 2020-01-24 PROCEDURE — 36415 COLL VENOUS BLD VENIPUNCTURE: CPT

## 2020-01-24 PROCEDURE — 85025 COMPLETE CBC W/AUTO DIFF WBC: CPT

## 2020-01-24 PROCEDURE — 99214 OFFICE O/P EST MOD 30 MIN: CPT | Performed by: FAMILY MEDICINE

## 2020-01-24 RX ORDER — CYCLOBENZAPRINE HCL 5 MG
5 TABLET ORAL 3 TIMES DAILY PRN
Qty: 90 TABLET | Refills: 1 | Status: SHIPPED | OUTPATIENT
Start: 2020-01-24 | End: 2020-06-29

## 2020-01-24 NOTE — PROGRESS NOTES
"Subjective   Nicholas Duane Stovall is a 40 y.o. male.   Cc: follow up of chronic medical issues  HPI The patient comes in for check of their chronic medical issues which include Hypertension and Epilepsy. He has been at his base line. He denies recent seizures.His Schizophrenia is stable. He continues to see his Therapist .       The following portions of the patient's history were reviewed and updated as appropriate: allergies, current medications, past family history, past medical history, past social history, past surgical history and problem list.    Review of Systems   Constitutional: Negative for fatigue and fever.   Respiratory: Negative for cough, chest tightness and stridor.    Cardiovascular: Negative for chest pain, palpitations and leg swelling.       Objective   Physical Exam   Constitutional: He is oriented to person, place, and time. He appears well-developed and well-nourished.   HENT:   Head: Normocephalic and atraumatic.   Right Ear: External ear normal.   Left Ear: External ear normal.   Nose: Nose normal.   Mouth/Throat: Oropharynx is clear and moist.   Eyes: Conjunctivae are normal.   Neck: Normal range of motion.   Cardiovascular: Normal rate, regular rhythm and normal heart sounds. Exam reveals no gallop and no friction rub.   No murmur heard.  Pulmonary/Chest: Effort normal and breath sounds normal. No stridor. No respiratory distress. He has no wheezes. He has no rales.   Abdominal: Soft. Bowel sounds are normal. He exhibits no distension. There is no tenderness.   Neurological: He is alert and oriented to person, place, and time.   Skin: Skin is warm and dry.   Vitals reviewed.        Visit Vitals  /76   Pulse 78   Ht 170.2 cm (67\")   Wt 98.6 kg (217 lb 5 oz)   SpO2 99%   BMI 34.04 kg/m²     Body mass index is 34.04 kg/m².      Assessment/Plan   Moshe was seen today for follow-up and hypertension.    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    Return to the clinic in 3 month/s.  Will contact with results as needed.

## 2020-03-02 RX ORDER — METOPROLOL TARTRATE 50 MG/1
TABLET, FILM COATED ORAL
Qty: 60 TABLET | Refills: 5 | Status: SHIPPED | OUTPATIENT
Start: 2020-03-02 | End: 2020-08-03

## 2020-04-24 ENCOUNTER — OFFICE VISIT (OUTPATIENT)
Dept: FAMILY MEDICINE CLINIC | Facility: CLINIC | Age: 40
End: 2020-04-24

## 2020-04-24 VITALS
SYSTOLIC BLOOD PRESSURE: 125 MMHG | HEIGHT: 67 IN | DIASTOLIC BLOOD PRESSURE: 83 MMHG | WEIGHT: 214 LBS | HEART RATE: 80 BPM | BODY MASS INDEX: 33.59 KG/M2

## 2020-04-24 DIAGNOSIS — G40.909 NONINTRACTABLE EPILEPSY WITHOUT STATUS EPILEPTICUS, UNSPECIFIED EPILEPSY TYPE (HCC): ICD-10-CM

## 2020-04-24 DIAGNOSIS — I10 ESSENTIAL HYPERTENSION: Primary | ICD-10-CM

## 2020-04-24 DIAGNOSIS — F20.9 SCHIZOPHRENIA, UNSPECIFIED TYPE (HCC): ICD-10-CM

## 2020-04-24 PROCEDURE — 99442 PR PHYS/QHP TELEPHONE EVALUATION 11-20 MIN: CPT | Performed by: FAMILY MEDICINE

## 2020-04-24 NOTE — PROGRESS NOTES
Subjective   Nicholas Duane Stovall is a 40 y.o. male.     Hypertension   This is a chronic problem. The current episode started more than 1 year ago. The problem is unchanged. The problem is controlled. Associated symptoms include headaches. Pertinent negatives include no anxiety, blurred vision, chest pain, malaise/fatigue, neck pain, orthopnea, palpitations, peripheral edema, PND ([id number]), shortness of breath or sweats. Risk factors for coronary artery disease include stress. Current antihypertension treatment includes calcium channel blockers.   Seizures    This is a chronic problem. The problem has been gradually improving. Associated symptoms include headaches. Pertinent negatives include no sleepiness, no confusion, no speech difficulty, no visual disturbance, no neck stiffness, no sore throat, no chest pain, no cough, no nausea, no vomiting, no diarrhea and no muscle weakness. Characteristics do not include eye blinking, eye deviation, bowel incontinence, bladder incontinence, rhythmic jerking, loss of consciousness, bit tongue, apnea or cyanosis.   Schizophrenia- It is at his base line. He denies voices.or abnormal thought.   He is at his base line as far as his other medical issues go.    The following portions of the patient's history were reviewed and updated as appropriate: allergies, current medications, past family history, past medical history, past social history, past surgical history and problem list.    Review of Systems   Constitutional: Negative for activity change, appetite change, chills, diaphoresis, fatigue, fever, malaise/fatigue and unexpected weight change.   HENT: Negative for congestion, dental problem, ear discharge, ear pain, facial swelling, hearing loss, mouth sores, nosebleeds, postnasal drip, rhinorrhea, sinus pressure, sinus pain, sneezing, sore throat, tinnitus, trouble swallowing and voice change.    Eyes: Negative for blurred vision, photophobia, pain, discharge, redness,  "itching and visual disturbance.   Respiratory: Negative for apnea, cough, choking, chest tightness, shortness of breath, wheezing and stridor.    Cardiovascular: Negative for chest pain, palpitations, orthopnea, leg swelling, PND ([id number]) and cyanosis.   Gastrointestinal: Negative for abdominal distention, abdominal pain, anal bleeding, blood in stool, bowel incontinence, constipation, diarrhea, nausea, rectal pain and vomiting.   Endocrine: Negative for cold intolerance, heat intolerance, polydipsia, polyphagia and polyuria.   Genitourinary: Negative for bladder incontinence, decreased urine volume, difficulty urinating, dysuria, enuresis, flank pain, frequency, hematuria and urgency.   Musculoskeletal: Negative for arthralgias, back pain, gait problem, joint swelling, myalgias, neck pain and neck stiffness.   Skin: Negative for color change, pallor, rash and wound.   Allergic/Immunologic: Negative for environmental allergies, food allergies and immunocompromised state.   Neurological: Positive for seizures, light-headedness and headaches. Negative for dizziness, tremors, loss of consciousness, syncope, facial asymmetry, speech difficulty, weakness and numbness.   Hematological: Negative for adenopathy. Does not bruise/bleed easily.   Psychiatric/Behavioral: Negative for agitation, behavioral problems, confusion, decreased concentration, dysphoric mood, hallucinations, self-injury, sleep disturbance and suicidal ideas. The patient is not nervous/anxious and is not hyperactive.        Objective   Physical Exam      Visit Vitals  /83   Pulse 80   Ht 170.2 cm (67\")   Wt 97.1 kg (214 lb)   BMI 33.52 kg/m²     Body mass index is 33.52 kg/m².      Assessment/Plan   Moshe was seen today for follow-up and hypertension.    Diagnoses and all orders for this visit:    Essential hypertension    Nonintractable epilepsy without status epilepticus, unspecified epilepsy type (CMS/HCC)    Schizophrenia, unspecified type " (CMS/[facility])    Return to the clinic in 3 month/s.  Will contact with results as needed.  Time spent-21 minutes.

## 2020-06-02 LAB
BH CV ECHO MEAS - ACS: 2.2 CM
BH CV ECHO MEAS - AO MAX PG (FULL): 3.8 MMHG
BH CV ECHO MEAS - AO MAX PG: 8.8 MMHG
BH CV ECHO MEAS - AO MEAN PG (FULL): 2 MMHG
BH CV ECHO MEAS - AO MEAN PG: 5 MMHG
BH CV ECHO MEAS - AO ROOT AREA (BSA CORRECTED): 1.7
BH CV ECHO MEAS - AO ROOT AREA: 10.2 CM^2
BH CV ECHO MEAS - AO ROOT DIAM: 3.6 CM
BH CV ECHO MEAS - AO V2 MAX: 148 CM/SEC
BH CV ECHO MEAS - AO V2 MEAN: 107 CM/SEC
BH CV ECHO MEAS - AO V2 VTI: 31.9 CM
BH CV ECHO MEAS - ASC AORTA: 3.2 CM
BH CV ECHO MEAS - AVA(I,A): 2.4 CM^2
BH CV ECHO MEAS - AVA(I,D): 2.4 CM^2
BH CV ECHO MEAS - AVA(V,A): 2.6 CM^2
BH CV ECHO MEAS - AVA(V,D): 2.6 CM^2
BH CV ECHO MEAS - BSA(HAYCOCK): 2.2 M^2
BH CV ECHO MEAS - BSA: 2.1 M^2
BH CV ECHO MEAS - BZI_BMI: 33.5 KILOGRAMS/M^2
BH CV ECHO MEAS - BZI_METRIC_HEIGHT: 170.2 CM
BH CV ECHO MEAS - BZI_METRIC_WEIGHT: 97.1 KG
BH CV ECHO MEAS - EDV(CUBED): 175.6 ML
BH CV ECHO MEAS - EDV(MOD-SP2): 91 ML
BH CV ECHO MEAS - EDV(MOD-SP4): 100 ML
BH CV ECHO MEAS - EDV(TEICH): 153.7 ML
BH CV ECHO MEAS - EF(CUBED): 66.2 %
BH CV ECHO MEAS - EF(MOD-SP2): 64.8 %
BH CV ECHO MEAS - EF(MOD-SP4): 57 %
BH CV ECHO MEAS - EF(TEICH): 57.1 %
BH CV ECHO MEAS - EPSS: 0.5 CM
BH CV ECHO MEAS - ESV(CUBED): 59.3 ML
BH CV ECHO MEAS - ESV(MOD-SP2): 32 ML
BH CV ECHO MEAS - ESV(MOD-SP4): 43 ML
BH CV ECHO MEAS - ESV(TEICH): 65.9 ML
BH CV ECHO MEAS - FS: 30.4 %
BH CV ECHO MEAS - IVS/LVPW: 0.82
BH CV ECHO MEAS - IVSD: 0.9 CM
BH CV ECHO MEAS - LA DIMENSION: 3.5 CM
BH CV ECHO MEAS - LA/AO: 0.97
BH CV ECHO MEAS - LV DIASTOLIC VOL/BSA (35-75): 48.1 ML/M^2
BH CV ECHO MEAS - LV IVRT: 0.04 SEC
BH CV ECHO MEAS - LV MASS(C)D: 219.7 GRAMS
BH CV ECHO MEAS - LV MASS(C)DI: 105.6 GRAMS/M^2
BH CV ECHO MEAS - LV MAX PG: 4.9 MMHG
BH CV ECHO MEAS - LV MEAN PG: 3 MMHG
BH CV ECHO MEAS - LV SYSTOLIC VOL/BSA (12-30): 20.7 ML/M^2
BH CV ECHO MEAS - LV V1 MAX: 111 CM/SEC
BH CV ECHO MEAS - LV V1 MEAN: 78.4 CM/SEC
BH CV ECHO MEAS - LV V1 VTI: 21.8 CM
BH CV ECHO MEAS - LVIDD: 5.6 CM
BH CV ECHO MEAS - LVIDS: 3.9 CM
BH CV ECHO MEAS - LVLD AP2: 8.2 CM
BH CV ECHO MEAS - LVLD AP4: 8.5 CM
BH CV ECHO MEAS - LVLS AP2: 7.4 CM
BH CV ECHO MEAS - LVLS AP4: 7.5 CM
BH CV ECHO MEAS - LVOT AREA (M): 3.5 CM^2
BH CV ECHO MEAS - LVOT AREA: 3.5 CM^2
BH CV ECHO MEAS - LVOT DIAM: 2.1 CM
BH CV ECHO MEAS - LVPWD: 1.1 CM
BH CV ECHO MEAS - MR ALIAS VEL: 30.8 CM/SEC
BH CV ECHO MEAS - MR ERO: 0.04 CM^2
BH CV ECHO MEAS - MR FLOW RATE: 17.4 CM^3/SEC
BH CV ECHO MEAS - MR MAX PG: 76.4 MMHG
BH CV ECHO MEAS - MR MAX VEL: 437 CM/SEC
BH CV ECHO MEAS - MR PISA RADIUS: 0.3 CM
BH CV ECHO MEAS - MR PISA: 0.57 CM^2
BH CV ECHO MEAS - MV A MAX VEL: 53.3 CM/SEC
BH CV ECHO MEAS - MV AREA (1 DIAM): 9.1 CM^2
BH CV ECHO MEAS - MV DEC SLOPE: 338 CM/SEC^2
BH CV ECHO MEAS - MV DIAM: 3.4 CM
BH CV ECHO MEAS - MV E MAX VEL: 61.2 CM/SEC
BH CV ECHO MEAS - MV E/A: 1.1
BH CV ECHO MEAS - MV FLOW AREA(1DIAM): 9.1 CM^2
BH CV ECHO MEAS - MV MAX PG: 1.8 MMHG
BH CV ECHO MEAS - MV MEAN PG: 1 MMHG
BH CV ECHO MEAS - MV P1/2T MAX VEL: 67.2 CM/SEC
BH CV ECHO MEAS - MV P1/2T: 58.2 MSEC
BH CV ECHO MEAS - MV V2 MAX: 67.6 CM/SEC
BH CV ECHO MEAS - MV V2 MEAN: 46.3 CM/SEC
BH CV ECHO MEAS - MV V2 VTI: 17.3 CM
BH CV ECHO MEAS - MVA P1/2T LCG: 3.3 CM^2
BH CV ECHO MEAS - MVA(P1/2T): 3.8 CM^2
BH CV ECHO MEAS - MVA(VTI): 4.4 CM^2
BH CV ECHO MEAS - PA MAX PG (FULL): 3.8 MMHG
BH CV ECHO MEAS - PA MAX PG: 6.3 MMHG
BH CV ECHO MEAS - PA MEAN PG (FULL): 2 MMHG
BH CV ECHO MEAS - PA MEAN PG: 3 MMHG
BH CV ECHO MEAS - PA V2 MAX: 125 CM/SEC
BH CV ECHO MEAS - PA V2 MEAN: 79.6 CM/SEC
BH CV ECHO MEAS - PA V2 VTI: 23.8 CM
BH CV ECHO MEAS - RAP SYSTOLE: 3 MMHG
BH CV ECHO MEAS - RF(MV,AO)(1 DIAM): -1.1
BH CV ECHO MEAS - RF(MV,LVOT)(1DIAM): 0.52
BH CV ECHO MEAS - RV MAX PG: 2.4 MMHG
BH CV ECHO MEAS - RV MEAN PG: 1 MMHG
BH CV ECHO MEAS - RV V1 MAX: 78.2 CM/SEC
BH CV ECHO MEAS - RV V1 MEAN: 55 CM/SEC
BH CV ECHO MEAS - RV V1 VTI: 15.6 CM
BH CV ECHO MEAS - RVDD: 3.8 CM
BH CV ECHO MEAS - RVSP: 12.5 MMHG
BH CV ECHO MEAS - SI(AO): 156 ML/M^2
BH CV ECHO MEAS - SI(CUBED): 55.9 ML/M^2
BH CV ECHO MEAS - SI(LVOT): 36.3 ML/M^2
BH CV ECHO MEAS - SI(MOD-SP2): 28.4 ML/M^2
BH CV ECHO MEAS - SI(MOD-SP4): 27.4 ML/M^2
BH CV ECHO MEAS - SI(MV 1 DIAM): 75.5 ML/M^2
BH CV ECHO MEAS - SI(TEICH): 42.2 ML/M^2
BH CV ECHO MEAS - SV(AO): 324.7 ML
BH CV ECHO MEAS - SV(CUBED): 116.3 ML
BH CV ECHO MEAS - SV(LVOT): 75.5 ML
BH CV ECHO MEAS - SV(MOD-SP2): 59 ML
BH CV ECHO MEAS - SV(MOD-SP4): 57 ML
BH CV ECHO MEAS - SV(MV 1 DIAM): 157.1 ML
BH CV ECHO MEAS - SV(TEICH): 87.8 ML
BH CV ECHO MEAS - TR MAX VEL: 154 CM/SEC
BH CV VAS BP LEFT ARM: NORMAL MMHG

## 2020-06-03 RX ORDER — AMLODIPINE BESYLATE 10 MG/1
TABLET ORAL
Qty: 30 TABLET | Refills: 5 | Status: SHIPPED | OUTPATIENT
Start: 2020-06-03 | End: 2020-10-28

## 2020-06-11 DIAGNOSIS — I34.0 NONRHEUMATIC MITRAL VALVE REGURGITATION: Primary | ICD-10-CM

## 2020-06-11 PROCEDURE — 93000 ELECTROCARDIOGRAM COMPLETE: CPT | Performed by: INTERNAL MEDICINE

## 2020-06-12 ENCOUNTER — HOSPITAL ENCOUNTER (OUTPATIENT)
Dept: GENERAL RADIOLOGY | Facility: HOSPITAL | Age: 40
Discharge: HOME | End: 2020-06-12
Admitting: INTERNAL MEDICINE

## 2020-06-12 ENCOUNTER — OFFICE VISIT (OUTPATIENT)
Dept: CARDIOLOGY | Facility: CLINIC | Age: 40
End: 2020-06-12

## 2020-06-12 VITALS
SYSTOLIC BLOOD PRESSURE: 126 MMHG | OXYGEN SATURATION: 100 % | HEART RATE: 66 BPM | WEIGHT: 223.2 LBS | BODY MASS INDEX: 35.03 KG/M2 | DIASTOLIC BLOOD PRESSURE: 80 MMHG | HEIGHT: 67 IN

## 2020-06-12 DIAGNOSIS — I34.0 NON-RHEUMATIC MITRAL REGURGITATION: ICD-10-CM

## 2020-06-12 DIAGNOSIS — E66.09 CLASS 2 OBESITY DUE TO EXCESS CALORIES WITHOUT SERIOUS COMORBIDITY WITH BODY MASS INDEX (BMI) OF 35.0 TO 35.9 IN ADULT: ICD-10-CM

## 2020-06-12 DIAGNOSIS — Q20.8 ABNORMALITY OF RIGHT VENTRICLE OF HEART: ICD-10-CM

## 2020-06-12 DIAGNOSIS — I27.20 PULMONARY HYPERTENSION (HCC): Primary | ICD-10-CM

## 2020-06-12 DIAGNOSIS — I31.39 PERICARDIAL EFFUSION: ICD-10-CM

## 2020-06-12 PROCEDURE — 93000 ELECTROCARDIOGRAM COMPLETE: CPT | Performed by: INTERNAL MEDICINE

## 2020-06-12 PROCEDURE — 99214 OFFICE O/P EST MOD 30 MIN: CPT | Performed by: INTERNAL MEDICINE

## 2020-06-12 PROCEDURE — 71046 X-RAY EXAM CHEST 2 VIEWS: CPT

## 2020-06-12 NOTE — PATIENT INSTRUCTIONS
Pulmonary Hypertension  Pulmonary hypertension is a long-term (chronic) condition in which there is high blood pressure in the arteries in the lungs (pulmonary arteries). This condition occurs when pulmonary arteries become narrow and tight, making it harder for blood to flow through the lungs. This in turn makes the heart work harder to pump blood through the lungs, making it harder for you to breathe.  Over time, pulmonary hypertension can weaken and damage the heart muscle, specifically the right side of the heart. Pulmonary hypertension is a serious condition that can be life-threatening.  What are the causes?  This condition may be caused by different medical conditions. It can be categorized by cause into five groups:  · Group 1: Pulmonary hypertension that is caused by abnormal growth of small blood vessels in the lungs (pulmonary arterial hypertension). The abnormal blood vessel growth may have no known cause, or it may be:  ? Passed from parent to child (hereditary).  ? Caused by another disease, such as a connective tissue disease (including lupus or scleroderma), congenital heart disease, liver disease, or HIV.  ? Caused by certain medicines or poisons (toxins).  · Group 2: Pulmonary hypertension that is caused by weakness of the left chamber of the heart (left ventricle) or heart valve disease.  · Group 3: Pulmonary hypertension that is caused by lung disease or low oxygen levels. Causes in this group include:  ? Emphysema or chronic obstructive pulmonary disease (COPD).  ? Untreated sleep apnea.  ? Pulmonary fibrosis.  ? Long-term exposure to high altitudes in certain people who may already be at higher risk for pulmonary hypertension.  · Group 4: Pulmonary hypertension that is caused by blood clots in the lungs (pulmonary emboli).  · Group 5: Other causes of pulmonary hypertension, such as sickle cell anemia, sarcoidosis, tumors pressing on the pulmonary arteries, and various other diseases.  What are  the signs or symptoms?  Symptoms of this condition include:  · Shortness of breath. You may notice shortness of breath with:  ? Activity, such as walking.  ? Minimal activity, such as getting dressed.  ? No activity, like when you are sitting still.  · A cough. Sometimes, bloody mucus from the lungs may be coughed up (hemoptysis).  · Tiredness and fatigue.  · Dizziness, lightheadedness, or fainting, especially with physical activity.  · Rapid heartbeat, or feeling your heart flutter or skip a beat (palpitations).  · Veins in the neck getting larger.  · Swelling of the lower legs, abdomen, or both.  · Bluish color of the lips and fingertips.  · Chest pain or tightness in the chest.  · Abdominal pain, especially in the upper abdomen.  How is this diagnosed?  This condition may be diagnosed based on one or more of the following tests:  · Chest X-ray.  · Blood tests.  · CT scan.  · Pulmonary function test. This test measures how much air your lungs can hold. It also tests how well air moves in and out of your lungs.  · 6-minute walk test. This tests how severe your condition is in relation to your activity levels.  · Electrocardiogram (ECG). This test records the electrical impulses of the heart.  · Echocardiogram. This test uses sound waves (ultrasound) to produce an image of the heart.  · Cardiac catheterization. This is a procedure in which a thin tube (catheter) is passed into the pulmonary artery and used to test the pressure in your pulmonary artery and the right side of your heart.  · Lung biopsy. This involves having a procedure to remove a small sample of lung tissue for testing. This may help determine an underlying cause of your pulmonary hypertension.  How is this treated?  There is no cure for this condition, but treatment can help to relieve symptoms and slow the progress of the condition. Treatment may include:  · Cardiac rehabilitation. This is a treatment program that includes exercise training,  education, and counseling to help you get stronger and return to an active lifestyle.  · Oxygen therapy.  · Medicines that:  ? Lower blood pressure.  ? Relax (dilate) the pulmonary blood vessels.  ? Help the heart beat more efficiently and pump more blood.  ? Help the body get rid of extra fluid (diuretics).  ? Thin the blood in order to prevent blood clots in the lungs.  · Lung surgery to relieve pressure on the heart, for severe cases that do not respond to medical treatment.  · Heart-lung transplant, or lung transplant. This may be done in very severe cases.  Follow these instructions at home:  Eating and drinking    · Eat a healthy diet that includes plenty of fresh fruits and vegetables, whole grains, and beans.  · Limit your salt (sodium) intake to less than 2,300 mg a day.  Lifestyle  · Do not use any products that contain nicotine or tobacco, such as cigarettes and e-cigarettes. If you need help quitting, ask your health care provider.  · Avoid secondhand smoke.  Activity  · Get plenty of rest.  · Exercise as directed. Talk with your health care provider about what type of exercise is safe for you.  · Avoid hot tubs and saunas.  · Avoid high altitudes.  General instructions  · Take over-the-counter and prescription medicines only as told by your health care provider. Do not change or stop medicines without checking with your health care provider.  · Stay up to date on your vaccines, especially yearly flu (influenza) and pneumonia vaccines.  · If you are a woman of child-bearing age, avoid becoming pregnant. Talk with your health care provider about birth control.  · Consider ways to get support for anxiety and stress of living with pulmonary hypertension. Talk with your health care provider about support groups and online resources.  · Use oxygen therapy at home as directed.  · Keep track of your weight. Weight gain could be a sign that your condition is getting worse.  · Keep all follow-up visits as told by  your health care provider. This is important.  Contact a health care provider if:  · Your cough gets worse.  · You have more shortness of breath than usual, or you start to have trouble doing activities that you could do before.  · You need to use medicines or oxygen more frequently or in higher dosages than usual.  Get help right away if:  · You have severe shortness of breath.  · You have chest pain or pressure.  · You cough up blood.  · You have swelling of your feet or legs that gets worse.  · You have rapid weight gain over a period of 1-2 days.  · Your medicines or oxygen do not provide relief.  Summary  · Pulmonary hypertension is a chronic condition in which there is high blood pressure in the arteries in the lungs (pulmonary arteries).  · Pulmonary hypertension is a serious condition that can be life-threatening. It can be caused by a variety of illnesses.  · Treatment may involve taking medicines and using oxygen therapy. Severe cases may require surgery or a transplant.  This information is not intended to replace advice given to you by your health care provider. Make sure you discuss any questions you have with your health care provider.  Document Released: 10/14/2008 Document Revised: 11/30/2018 Document Reviewed: 03/13/2018  [employer] Patient Education © 2020 [employer]    Dr. Lao has recommended a Six-Minute Walk Test    What Is the Six-Minute Walk Test?    The [employer] Society describes the six-minute walk test as a measure of functional status or fitness. It is used as a simple measure of aerobic exercise capacity. The results of this test may or may not lead your doctor to do more sophisticated measures of your heart and lung function. During this test, you walk at your normal pace for six minutes. This test can be used to monitor your response to treatments for heart, lung and other health problems. This test is commonly used for people with pulmonary hypertension, interstitial  lung disease, pre-lung transplant evaluation or COPD.      What to Expect When Doing the Test      Preparing for your test:  · Wear clothes and shoes that are comfortable.  · You may use your usual walking aids such as a cane or walker, if needed.  · It is okay to eat a light meal prior to your test.  · Take your usual medications.  · Do not exercise within two hours of testing.      During the test:  · The heike will measure your blood pressure, pulse and oxygen level usually with a pulse oximeter before you start to walk.  · You should be given the following instructions: The object of the test is to walk as far as possible for six minutes. You will walk at your normal pace to a chair or cone, and turn around. And you continue to walk back and forth for six minutes.  · Let the staff know if you are having chest pain or breathing difficulty.  · It is acceptable to slow down, rest or stop. After every minute interval, you will be given an update.      Safety:  · The heike will watch to see if you have breathing difficulty or chest pain.  · Oxygen and other supplies will be nearby if you need them.      Understanding the Results  The results of your test are then compared to what is known to be normal for people in your weight, height, gender and age categories. They can be used to estimate response to treatment, especially if repeated after a time interval, for instance six months or a year later. After your test, your provider may change your medication or exercise program based on your results.      What Are the Risks?  This is a low-risk medical evaluation. Medical help is easily available while the test is being done.          This content was developed in partnership with the [employer], the philanthropic arm of the [employer].  Approved by Scientific and Medical Editorial Review Panel. Last reviewed May 31, 2017.        Pulmonary Function Tests  Pulmonary function tests (PFTs)  are used to measure how well your lungs work, find out what is causing your lung problems, and figure out the best treatment for you. You may have PFTs:  · When you have an illness involving the lungs.  · To follow changes in your lung function over time if you have a chronic lung disease.  · If you are an industrial . This checks the effects of being exposed to chemicals over a long period of time.  · To check lung function before having surgery or other procedures.  · To check your lungs if you smoke.  · To check if prescribed medicines or treatments are helping your lungs.  Your results will be compared to the expected lung function of someone with healthy lungs who is similar to you in:  · Age.  · Gender.  · Height.  · Weight.  · Race or ethnicity.  This is done to show how your lungs compare to normal lung function (percent predicted). This is how your health care provider knows if your lung function is normal or not. If you have had PFTs done before, your health care provider will compare your current results with past results. This shows if your lung function is better, worse, or the same as before.  Tell a health care provider about:  · Any allergies you have.  · All medicines you are taking, including inhaler or nebulizer medicines, vitamins, herbs, eye drops, creams, and over-the-counter medicines.  · Any blood disorders you have.  · Any surgeries you have had, especially recent eye surgery, abdominal surgery, or chest surgery. These can make PFTs difficult or unsafe.  · Any medical conditions you have, including chest pain or heart problems, tuberculosis, or respiratory infections such as pneumonia, a cold, or the flu.  · Any fear of being in closed spaces (claustrophobia). Some of your tests may be in a closed space.  What are the risks?  Generally, this is a safe procedure. However, problems may occur, including:  · Light-headedness due to over-breathing (hyperventilation).  · An asthma  attack from deep breathing.  · A collapsed lung.  What happens before the procedure?  · Take over-the-counter and prescription medicines only as told by your health care provider. If you take inhaler or nebulizer medicines, ask your health care provider which medicines you should take on the day of your testing. Some inhaler medicines may interfere with PFTs if they are taken shortly before the tests.  · Follow your health care provider's instructions on eating and drinking restrictions. This may include avoiding eating large meals and drinking alcohol before the testing.  · Do not use any products that contain nicotine or tobacco, such as cigarettes and e-cigarettes. If you need help quitting, ask your health care provider.  · Wear comfortable clothing that will not interfere with breathing.  What happens during the procedure?    · You will be given a soft nose clip to wear. This is done so all of your breaths will go through your mouth instead of your nose.  · You will be given a germ-free (sterile) mouthpiece. It will be attached to a machine that measures your breathing (spirometer).  · You will be asked to do various breathing maneuvers. The maneuvers will be done by breathing in (inhaling) and breathing out (exhaling). You may be asked to repeat the maneuvers several times before the testing is done.  · It is important to follow the instructions exactly to get accurate results. Make sure to blow as hard and as fast as you can when you are told to do so.  · You may be given a medicine that makes the small air passages in your lungs larger (bronchodilator) after testing has been done. This medicine will make it easier for you to breathe.  · The tests will be repeated after the bronchodilator has taken effect.  · You will be monitored carefully during the procedure for faintness, dizziness, trouble breathing, or any other problems.  The procedure may vary among health care providers and hospitals.  What happens  after the procedure?  · It is up to you to get your test results. Ask your health care provider, or the department that is doing the tests, when your results will be ready. After you have received your test results, talk with your health care provider about treatment options, if necessary.  Summary  · Pulmonary function tests (PFTs) are used to measure how well your lungs work, find out what is causing your lung problems, and figure out the best treatment for you.  · Wear comfortable clothing that will not interfere with breathing.  · It is up to you to get your test results. After you have received them, talk with your health care provider about treatment options, if necessary.  This information is not intended to replace advice given to you by your health care provider. Make sure you discuss any questions you have with your health care provider.  Document Released: 08/10/2005 Document Revised: 12/15/2017 Document Reviewed: 11/09/2017  [employer] Patient Education © 2020 [employer]

## 2020-06-12 NOTE — PROGRESS NOTES
Cardiovascular Medicine   Nick Lao M.D., Ph.D., [facility]      The patient returns to cardiovascular clinic for follow-up of the following:    PROBLEM LIST:  1. PAH  2.  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  Mitral regurgitation  4. Pericardial Effusion  5.  HEAVEN on CPAP    Nicholas Duane Stovall is a 40 y.o. male who returns to clinic today in follow-up.       Pulmonary Hypertension  He presents for evaluation and treatment of pulmonary hypertension. Symptoms include dyspnea on exertion.   He does not have had recent travel. Weight has been stable. Appetite has been unaffected. Symptoms are exacerbated by any exercise. Symptoms are alleviated by rest. 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  They report they can ambulate and sleep. The patient endorses limitations in the ability to N/A. Their reported METS is: >=4 METS with Symptoms. They report their capacity is stable since their last visit. They have not had ED visits or inpatient admissions for PAH-related issues. They have not had a recent 6MWT.  The do not have early abdominal satiety or ascites. They do remain in clinical surveillance.    Since his last appt he had a 2D TTE. This showed an abnormal RVOT AT suggestive of PAH. He does have HEAVEN and is CPAP compliant. He still push mows a steep hill.     RV abnormality  The last 2D TTE did show RV dysfunction. The RV cavity was dilated. The RV EF was normal. The TTE was also remarkable for elevated pulmonary pressures. Injection of agitated saline was performed. The patient has not had a RHC. The patient has not had a NIKO. The patient denies  early abdominal satiety. The patient denies  ascites. The last TTE was consistent with a RV that was RV adapted.      Valve Disease  Nicholas Duane Stovall is a 40 y.o. male who presents for follow-up of mitral valve disease.  Current status: mild MR.  The patient  has not had valve surgery.  The patient does not have a history of rheumatic fever. The patient does not need endocarditis prophylaxis.   The patient denies fatigue, palpitations, hemoptysis, syncope and atrial fibrillation. The patient endorses dyspnea.   The patient's TTE is Up to date. . They do remain in clinical surveillance.     Pericardial Effusion  He returns in follow-up today.  He is got a history of at least two 2D echocardiogram showing a small pericardial effusion.  No prior autoimmune history.  His last 2D echocardiogram showed stability.  He remains asymptomatic.    The following portions of the patient's history were reviewed and updated as appropriate: allergies, current medications, past family history, past medical history, past social history, past surgical history and problem list.        Review of Systems   Cardiovascular: Negative for chest pain, claudication, cyanosis, irregular heartbeat, leg swelling, near-syncope, orthopnea, palpitations, paroxysmal nocturnal dyspnea and syncope.   Respiratory: Negative for cough, hemoptysis and shortness of breath.      family history includes Breast cancer in an other family member; Cancer in his maternal grandmother and mother; Diabetes in an other family member; Heart disease in an other family member; Hyperlipidemia in his mother; Hypertension in his mother; No Known Problems in his father; Other in his mother; Rheum arthritis in his mother; Stroke in his maternal grandmother and mother.   reports that he has never smoked. He has never used smokeless tobacco. He reports that he does not drink alcohol or use drugs.  Allergies   Allergen Reactions   • Abilify [Aripiprazole] Confusion and Hallucinations   • Lexapro [Escitalopram] Other (See Comments)     Other reaction(s): JERKING   • Perphenazine Other (See Comments)     Sleep walking   • Depakote [Valproic Acid] Other (See Comments)     Other reaction(s): Other (See Comments)  Other reaction(s):  TREMORS  Other reaction(s): TREMORS   • Mirtazapine Other (See Comments)     Other reaction(s): Other (See Comments)  Other reaction(s): JERKING IN SLEEP  Other reaction(s): JERKING IN SLEEP   • Phenobarbital Confusion and Hallucinations     Hyper, Hallucination       Current Outpatient Medications:   •  amLODIPine (NORVASC) 10 MG tablet, TAKE ONE TABLET BY MOUTH EVERY DAY, Disp: 30 tablet, Rfl: 5  •  busPIRone (BUSPAR) 10 MG tablet, Take 10 mg by mouth 2 (Two) Times a Day., Disp: , Rfl:   •  carBAMazepine XR (TEGRETOL-XR) 200 MG 12 hr tablet, Take 200 mg by mouth 2 (two) times a day. Med Name: Tegretol  mg tablet,extended release; Indication: seizures, Disp: , Rfl:   •  citalopram (CELEXA) 20 MG tablet, Take 40 mg by mouth daily. Indication: depression, Disp: , Rfl:   •  cyclobenzaprine (FLEXERIL) 5 MG tablet, Take 1 tablet by mouth 3 (Three) Times a Day As Needed for Muscle Spasms., Disp: 90 tablet, Rfl: 1  •  levETIRAcetam (KEPPRA) 500 MG tablet, Take  by mouth Daily. 3 tabs AM, 3 tabs PM; Indication: seizures, Disp: , Rfl:   •  metoprolol tartrate (LOPRESSOR) 50 MG tablet, TAKE ONE TABLET BY MOUTH TWO TIMES A DAY, Disp: 60 tablet, Rfl: 5  •  minocycline (MINOCIN,DYNACIN) 100 MG capsule, Take 100 mg by mouth Daily., Disp: , Rfl:   •  Multiple Vitamins-Minerals (MULTIVITAMIN ADULT PO), Take 1 tablet by mouth 2 (Two) Times a Day., Disp: , Rfl:   •  primidone (MYSOLINE) 50 MG tablet, Take 3 tablets by mouth 2 (Two) Times a Day. 3 nightly, Disp: , Rfl:   •  risperiDONE (RISPERDAL) 4 MG tablet, TAKE 2 MG IN THE MORNING AND 6 MG IN THE EVENING, Disp: , Rfl:     Physical Exam:  Vitals:    06/12/20 0859   BP: 126/80   Pulse: 66   SpO2: 100%     Body mass index is 34.96 kg/m².   Last Weights:   Wt Readings from Last 3 Encounters:   04/24/20 97.1 kg (214 lb)   01/24/20 98.6 kg (217 lb 5 oz)   01/22/20 100 kg (221 lb)     Pulse Ox: Normal   General: alert, appears stated age and cooperative  Body Habitus:  obese  HEENT: Head: Normocephalic, no lesions, without obvious abnormality. No arcus senilis, xanthelasma or xanthomas.  JVP: 6 cm of water at 45 degrees   Volume/Pulsation: Normal  Heart rate: normal    Heart Rhythm: regular     Heart Sounds: S1: normal intensity  S2: normal intensity  S3: absent   S4: absent  Opening Snap: absent  A2-OS:  absent.   Pericardial rub: absent    Ejection click: None      Murmurs:  absent   Extremity: moves all extremities equally.       DATA REVIEWED:             TTE/NIKO:  Results for orders placed in visit on 10/28/19   Adult Transthoracic Echo Limited W/ Cont if Necessary Per Protocol    Narrative · Left ventricle systolic function is normal at 56-60%.  · Right ventricle systolic function is normal. Right ventricular cavity is   mildly dilated with hypertrophy  · Mild mitral regurgitation.  · The RVOT AT is abnormal and suggestive of pulmonary hypertension.  · Trivial pericardial effusion.                --------------------------------------------------------------------------------------------------  LABS:   Lab Results   Component Value Date    GLUCOSE 87 01/24/2020    BUN 8 01/24/2020    CREATININE 0.79 01/24/2020    EGFRIFNONA 108 01/24/2020    BCR 10.1 01/24/2020    K 4.2 01/24/2020    CO2 28.9 01/24/2020    CALCIUM 9.4 01/24/2020    ALBUMIN 5.00 01/24/2020    LABIL2 2.5 09/25/2019    AST 27 01/24/2020    ALT 33 01/24/2020     Lab Results   Component Value Date    WBC 5.75 01/24/2020    HGB 13.6 01/24/2020    HCT 39.4 01/24/2020    MCV 84.7 01/24/2020     01/24/2020     Lab Results   Component Value Date    CHOL 177 01/24/2020    CHLPL 169 01/13/2017    TRIG 139 01/24/2020    HDL 37 (L) 01/24/2020     (H) 01/24/2020     No results found for: TSH, W7VQMUZ, A2GXYVE, THYROIDAB  No results found for: CKTOTAL, CKMB, CKMBINDEX, TROPONINI, TROPONINT  No results found for: HGBA1C  No results found for: DDIMER  Lab Results   Component Value Date    ALT 33 01/24/2020      No results found for: HGBA1C  Lab Results   Component Value Date    CREATININE 0.79 01/24/2020     No results found for: IRON, TIBC, FERRITIN  Lab Results   Component Value Date    INR 0.9 02/09/2016    INR 0.9 12/17/2015    PROTIME 11.9 02/09/2016    PROTIME 12.5 12/17/2015       Assessment/Plan     1. PAH. PAH/PVH. WHO Group 3.3; FC: II.  RV status: Abnormal.  Patient appears euvolemic. Perfusion status: good.  Last 6MWT: N/A.    -Discussed evaluation, treatment and usual course.  -No current indication for PAH-specific medications  -No compelling indication at this time for RHC  -Will continue active clinical surveillance.  Signs and symptoms of worsening PAH and RV failure were discussed.  -I have asked the patient that if they were to move to be certain they see someone with expertise in PAH. These providers can be located at [email or web address].  -PFTs, CXR and 6MWT    2. Nonrheumatic mitral valve regurgitation. ACC stage B.  There are no surgical indications at this time.  · The patient has been advised to remain in clinical surveillance every 12 months.  · Signs and symptoms of worsening valve disease discussed.  I've asked the patient contact me for an earlier appointment if these develop.  · I've recommended a repeat 2D TTE every 3 years.  · TTE due: 2023.  No indication based on 2017 ACC/AHA guidelines for IE prophylaxis for dental procedures: Optimal oral health is recommended through regular professional dental care and the use of appropriate dental products, such as manual, powered, and ultrasonic toothbrushes; dental floss; and other plaque-removal devices    3.  Pericardial effusion. The prevalence of pericardial effusion is about 3%.  The patient is currently Asymptomatic.  The size of the effusion is small.  I briefly discussed the etiology of pericardial effusions.  A handout was also offered to the patient which explained the disease process, including signs of worsening pericardial  effusion.  At this point I have counseled conservative management.   -Will repeat a 2D TTE in 1 year  -Please call for worsening signs or symptoms.    4. Cardiac Risk Assessments based on 2019 ACCF guidelines:  A team-based care approach is recommended for the control of risk factors associated with ASCAD.  As such, Nicholas Duane Stovall was requested to have ongoing follow-up with their PCP. A PCP can provide the detailed monitoring that is required for management of risk factors. Essential HTN is a significant risk factor for stroke, heart disease and vascular disease. I've recommended the patient continue current medications, if any, as prescribed by the primary care provider. A diet emphasizing intake of vegetables, fruits, nuts, whole grains and fish is recommended. Physical activity recommendations were provided by literature. They were also provided with information regarding maintaining a healthy weight, heart-healthy dietary pattern and DASH information.  Goal blood pressure less than 130/80.  The patient's BMI is recommended to be calculated at least annually.  The patient's BMI is Body mass index is 34.96 kg/m²..  Tobacco status is assessed at every visit based on established guidelines.  The patient's nicotine status: Social History    Tobacco Use      Smoking status: Never Smoker      Smokeless tobacco: Never Used    Return in about 3 months (around 9/12/2020).

## 2020-06-15 ENCOUNTER — DOCUMENTATION (OUTPATIENT)
Dept: CARDIOLOGY | Facility: CLINIC | Age: 40
End: 2020-06-15

## 2020-06-15 NOTE — PROGRESS NOTES
Nick Lao MD PhD  Magy Dominguez RN             Can you call and let him know that I looked at his chest x-ray and it is normal?      Patient notified

## 2020-06-29 RX ORDER — CYCLOBENZAPRINE HCL 5 MG
5 TABLET ORAL 3 TIMES DAILY PRN
Qty: 90 TABLET | Refills: 1 | Status: SHIPPED | OUTPATIENT
Start: 2020-06-29 | End: 2020-10-01

## 2020-06-30 ENCOUNTER — OFFICE VISIT (OUTPATIENT)
Dept: PULMONOLOGY | Facility: CLINIC | Age: 40
End: 2020-06-30

## 2020-06-30 ENCOUNTER — OFFICE VISIT (OUTPATIENT)
Dept: CARDIOLOGY | Facility: CLINIC | Age: 40
End: 2020-06-30

## 2020-06-30 DIAGNOSIS — I27.20 PULMONARY HYPERTENSION (HCC): ICD-10-CM

## 2020-06-30 PROCEDURE — 94010 BREATHING CAPACITY TEST: CPT | Performed by: INTERNAL MEDICINE

## 2020-06-30 PROCEDURE — 94727 GAS DIL/WSHOT DETER LNG VOL: CPT | Performed by: INTERNAL MEDICINE

## 2020-06-30 PROCEDURE — 94618 PULMONARY STRESS TESTING: CPT | Performed by: INTERNAL MEDICINE

## 2020-06-30 PROCEDURE — 94729 DIFFUSING CAPACITY: CPT | Performed by: INTERNAL MEDICINE

## 2020-06-30 NOTE — PROGRESS NOTES
Nicholas Duane Stovall  Procedure: 6 Minute Walk Test   Indication:pulmonary hypertension    Pretest: BP:126/74               HR:78               Sa02:98               Dyspnea:2               Fatigue:0    Post Test: BP:138/88               HR:85               Sa02:99               Dyspnea:5               Fatigue:2    First 6MWT:yes    Supplemental oxygen during test:no    Stopped before 6 minutes:no    Pauses:none    Results in distance walked:404.46 m    Did individual experience any pain or discomfort:no    Attestation:  I was immediately available for the above test and agree with the data.     NYHA Functional Class I.    6MWT speed today is:  Community Ambulator (>0.8 m/s)        Nick Lao MD PhD    -----------------------------------------------------------------------------------------------------------------  [facility] performs 6MWT to the ATS guidelines for 6MWT (2002). Predicted distance is from:      -------------------------------------------------------------------------------------------------------------

## 2020-06-30 NOTE — PROCEDURES
Pulmonary Function Test  Performed by: Elissa Esparza MD  Authorized by: Nick Lao MD PhD      Pre Drug    FVC: 88%   FEV1: 58%   FEV1/FVC: 53%   T%   DLCO: 96%    Interpretation   Spirometry   Spirometry shows moderate obstruction.   Review of FVL curve   Effort is normal.   Lung Volume Measurements  Measurements show: normal results.   Diffusion Capacity  The patient's diffusion capacity is normal.

## 2020-07-27 ENCOUNTER — OFFICE VISIT (OUTPATIENT)
Dept: FAMILY MEDICINE CLINIC | Facility: CLINIC | Age: 40
End: 2020-07-27

## 2020-07-27 ENCOUNTER — LAB (OUTPATIENT)
Dept: LAB | Facility: HOSPITAL | Age: 40
End: 2020-07-27

## 2020-07-27 VITALS
OXYGEN SATURATION: 99 % | BODY MASS INDEX: 33.93 KG/M2 | SYSTOLIC BLOOD PRESSURE: 124 MMHG | HEART RATE: 73 BPM | HEIGHT: 67 IN | WEIGHT: 216.19 LBS | DIASTOLIC BLOOD PRESSURE: 70 MMHG

## 2020-07-27 DIAGNOSIS — E78.5 HYPERLIPIDEMIA, UNSPECIFIED HYPERLIPIDEMIA TYPE: ICD-10-CM

## 2020-07-27 DIAGNOSIS — I10 ESSENTIAL HYPERTENSION: Primary | ICD-10-CM

## 2020-07-27 DIAGNOSIS — G40.909 NONINTRACTABLE EPILEPSY WITHOUT STATUS EPILEPTICUS, UNSPECIFIED EPILEPSY TYPE (HCC): ICD-10-CM

## 2020-07-27 DIAGNOSIS — I10 ESSENTIAL HYPERTENSION: ICD-10-CM

## 2020-07-27 LAB
ALBUMIN SERPL-MCNC: 4.8 G/DL (ref 3.5–5.2)
ALBUMIN/GLOB SERPL: 2.7 G/DL
ALP SERPL-CCNC: 89 U/L (ref 39–117)
ALT SERPL W P-5'-P-CCNC: 29 U/L (ref 1–41)
ANION GAP SERPL CALCULATED.3IONS-SCNC: 7.5 MMOL/L (ref 5–15)
AST SERPL-CCNC: 27 U/L (ref 1–40)
BASOPHILS # BLD AUTO: 0.06 10*3/MM3 (ref 0–0.2)
BASOPHILS NFR BLD AUTO: 1.3 % (ref 0–1.5)
BILIRUB SERPL-MCNC: 0.3 MG/DL (ref 0–1.2)
BUN SERPL-MCNC: 6 MG/DL (ref 6–20)
BUN/CREAT SERPL: 7.8 (ref 7–25)
CALCIUM SPEC-SCNC: 9.4 MG/DL (ref 8.6–10.5)
CHLORIDE SERPL-SCNC: 98 MMOL/L (ref 98–107)
CHOLEST SERPL-MCNC: 152 MG/DL (ref 0–200)
CO2 SERPL-SCNC: 29.5 MMOL/L (ref 22–29)
CREAT SERPL-MCNC: 0.77 MG/DL (ref 0.76–1.27)
DEPRECATED RDW RBC AUTO: 39.2 FL (ref 37–54)
EOSINOPHIL # BLD AUTO: 0 10*3/MM3 (ref 0–0.4)
EOSINOPHIL NFR BLD AUTO: 0 % (ref 0.3–6.2)
ERYTHROCYTE [DISTWIDTH] IN BLOOD BY AUTOMATED COUNT: 13.2 % (ref 12.3–15.4)
GFR SERPL CREATININE-BSD FRML MDRD: 111 ML/MIN/1.73
GLOBULIN UR ELPH-MCNC: 1.8 GM/DL
GLUCOSE SERPL-MCNC: 93 MG/DL (ref 65–99)
HCT VFR BLD AUTO: 36.8 % (ref 37.5–51)
HDLC SERPL-MCNC: 35 MG/DL (ref 40–60)
HGB BLD-MCNC: 13 G/DL (ref 13–17.7)
IMM GRANULOCYTES # BLD AUTO: 0.02 10*3/MM3 (ref 0–0.05)
IMM GRANULOCYTES NFR BLD AUTO: 0.4 % (ref 0–0.5)
LDLC SERPL CALC-MCNC: 97 MG/DL (ref 0–100)
LDLC/HDLC SERPL: 2.77 {RATIO}
LYMPHOCYTES # BLD AUTO: 1.68 10*3/MM3 (ref 0.7–3.1)
LYMPHOCYTES NFR BLD AUTO: 36.3 % (ref 19.6–45.3)
MCH RBC QN AUTO: 29 PG (ref 26.6–33)
MCHC RBC AUTO-ENTMCNC: 35.3 G/DL (ref 31.5–35.7)
MCV RBC AUTO: 82.1 FL (ref 79–97)
MONOCYTES # BLD AUTO: 0.42 10*3/MM3 (ref 0.1–0.9)
MONOCYTES NFR BLD AUTO: 9.1 % (ref 5–12)
NEUTROPHILS NFR BLD AUTO: 2.45 10*3/MM3 (ref 1.7–7)
NEUTROPHILS NFR BLD AUTO: 52.9 % (ref 42.7–76)
NRBC BLD AUTO-RTO: 0 /100 WBC (ref 0–0.2)
PLATELET # BLD AUTO: 164 10*3/MM3 (ref 140–450)
PMV BLD AUTO: 9 FL (ref 6–12)
POTASSIUM SERPL-SCNC: 4.7 MMOL/L (ref 3.5–5.2)
PROT SERPL-MCNC: 6.6 G/DL (ref 6–8.5)
RBC # BLD AUTO: 4.48 10*6/MM3 (ref 4.14–5.8)
SODIUM SERPL-SCNC: 135 MMOL/L (ref 136–145)
TRIGL SERPL-MCNC: 101 MG/DL (ref 0–150)
VLDLC SERPL-MCNC: 20.2 MG/DL (ref 5–40)
WBC # BLD AUTO: 4.63 10*3/MM3 (ref 3.4–10.8)

## 2020-07-27 PROCEDURE — 85025 COMPLETE CBC W/AUTO DIFF WBC: CPT

## 2020-07-27 PROCEDURE — 80061 LIPID PANEL: CPT

## 2020-07-27 PROCEDURE — 80053 COMPREHEN METABOLIC PANEL: CPT

## 2020-07-27 PROCEDURE — 99214 OFFICE O/P EST MOD 30 MIN: CPT | Performed by: FAMILY MEDICINE

## 2020-07-27 PROCEDURE — 36415 COLL VENOUS BLD VENIPUNCTURE: CPT

## 2020-07-27 NOTE — PROGRESS NOTES
Subjective   Nicholas Duane Stovall is a 40 y.o. male.   Cc: follow up of chronic medical issues    Hypertension   This is a chronic problem. The current episode started more than 1 year ago. The problem has been gradually improving since onset. Pertinent negatives include no anxiety, blurred vision, chest pain, headaches, malaise/fatigue, neck pain, orthopnea, palpitations, peripheral edema, PND, shortness of breath or sweats. Current antihypertension treatment includes beta blockers and calcium channel blockers.   Hyperlipidemia   This is a chronic problem. The current episode started more than 1 year ago. Recent lipid tests were reviewed and are variable. Pertinent negatives include no chest pain or shortness of breath. Current antihyperlipidemic treatment includes diet change.   Seizure disorder- He hasn't had a seizure in almost two years.    The following portions of the patient's history were reviewed and updated as appropriate: allergies, current medications, past family history, past medical history, past social history, past surgical history and problem list.    Review of Systems   Constitutional: Negative for malaise/fatigue.   Eyes: Negative for blurred vision.   Respiratory: Negative for shortness of breath.    Cardiovascular: Negative for chest pain, palpitations, orthopnea and PND.   Musculoskeletal: Negative for neck pain.   Neurological: Negative for headaches.       Objective   Physical Exam   Constitutional: He is oriented to person, place, and time. He appears well-developed and well-nourished.   HENT:   Head: Normocephalic and atraumatic.   Right Ear: External ear normal.   Left Ear: External ear normal.   Nose: Nose normal.   Mouth/Throat: Oropharynx is clear and moist.   Eyes: Pupils are equal, round, and reactive to light.   Neck: Normal range of motion.   Cardiovascular: Normal rate, regular rhythm and normal heart sounds. Exam reveals no gallop and no friction rub.   No murmur  "heard.  Pulmonary/Chest: Effort normal and breath sounds normal. No stridor. No respiratory distress. He has no wheezes. He has no rales.   Abdominal: Soft. Bowel sounds are normal. He exhibits no distension and no mass. There is no tenderness. There is no guarding.   Neurological: He is alert and oriented to person, place, and time.   Skin: Skin is warm and dry.   Vitals reviewed.        Visit Vitals  /70   Pulse 73   Ht 170.2 cm (67\")   Wt 98.1 kg (216 lb 3 oz)   SpO2 99%   BMI 33.86 kg/m²     Body mass index is 33.86 kg/m².      Assessment/Plan   Moshe was seen today for follow-up, hypertension and med refill.    Diagnoses and all orders for this visit:    Essential hypertension  -     Comprehensive metabolic panel; Future  -     CBC w AUTO Differential; Future    Nonintractable epilepsy without status epilepticus, unspecified epilepsy type (CMS/HCC)    Hyperlipidemia, unspecified hyperlipidemia type  -     Lipid panel; Future    Return to the clinic in 3 month/s.  Will contact with results as needed.    "

## 2020-08-03 RX ORDER — METOPROLOL TARTRATE 50 MG/1
TABLET, FILM COATED ORAL
Qty: 60 TABLET | Refills: 5 | Status: SHIPPED | OUTPATIENT
Start: 2020-08-03 | End: 2020-10-29 | Stop reason: SDUPTHER

## 2020-09-17 ENCOUNTER — OFFICE VISIT (OUTPATIENT)
Dept: FAMILY MEDICINE CLINIC | Facility: CLINIC | Age: 40
End: 2020-09-17

## 2020-09-17 VITALS
DIASTOLIC BLOOD PRESSURE: 80 MMHG | HEART RATE: 88 BPM | OXYGEN SATURATION: 98 % | BODY MASS INDEX: 33.74 KG/M2 | WEIGHT: 215 LBS | SYSTOLIC BLOOD PRESSURE: 124 MMHG | HEIGHT: 67 IN

## 2020-09-17 DIAGNOSIS — L73.9 FOLLICULITIS: ICD-10-CM

## 2020-09-17 DIAGNOSIS — L98.9 SKIN LESIONS: Primary | ICD-10-CM

## 2020-09-17 PROCEDURE — 90471 IMMUNIZATION ADMIN: CPT | Performed by: FAMILY MEDICINE

## 2020-09-17 PROCEDURE — 90686 IIV4 VACC NO PRSV 0.5 ML IM: CPT | Performed by: FAMILY MEDICINE

## 2020-09-17 PROCEDURE — 99214 OFFICE O/P EST MOD 30 MIN: CPT | Performed by: FAMILY MEDICINE

## 2020-09-17 RX ORDER — CLINDAMYCIN HYDROCHLORIDE 300 MG/1
300 CAPSULE ORAL 3 TIMES DAILY
Qty: 30 CAPSULE | Refills: 0 | Status: SHIPPED | OUTPATIENT
Start: 2020-09-17 | End: 2020-10-27

## 2020-09-17 NOTE — PROGRESS NOTES
Subjective   Nicholas Duane Stovall is a 40 y.o. male.   Cc: follow up of chronic medical issues    Rash  This is a new problem. The current episode started in the past 7 days. The problem is unchanged. The affected locations include the face and scalp. Pertinent negatives include no congestion, cough, eye pain, fatigue, fever, joint pain, nail changes, rhinorrhea, shortness of breath, sore throat or vomiting.   These lesions come up as tiny pimples They itch nd tend to become unroofed with scratching . They will then scab over.    The following portions of the patient's history were reviewed and updated as appropriate: allergies, current medications, past family history, past medical history, past social history, past surgical history and problem list.    Review of Systems   Constitutional: Negative for fatigue and fever.   HENT: Negative for congestion, rhinorrhea and sore throat.    Eyes: Negative for pain.   Respiratory: Negative for cough and shortness of breath.    Gastrointestinal: Negative for vomiting.   Musculoskeletal: Negative for joint pain.   Skin: Positive for rash. Negative for nail changes.       Objective   Physical Exam  Vitals signs reviewed.   Constitutional:       Appearance: Normal appearance.   HENT:      Head: Normocephalic and atraumatic.      Right Ear: Tympanic membrane and external ear normal.      Left Ear: Tympanic membrane and external ear normal.      Nose: Nose normal.      Mouth/Throat:      Mouth: Mucous membranes are moist.      Pharynx: Oropharynx is clear.   Eyes:      Pupils: Pupils are equal, round, and reactive to light.   Cardiovascular:      Rate and Rhythm: Normal rate and regular rhythm.      Pulses: Normal pulses.      Heart sounds: Normal heart sounds. No murmur. No friction rub. No gallop.    Pulmonary:      Effort: Pulmonary effort is normal. No respiratory distress.      Breath sounds: Normal breath sounds. No stridor. No wheezing, rhonchi or rales.   Chest:       "Chest wall: No tenderness.   Abdominal:      General: Abdomen is flat.   Skin:     Comments: There are several scabbed over lesions over the left forehead. He also has a place in the posterior aspect of the scalp.There is the over the bridge of the nose.and  One on the anterior neck.   Neurological:      Mental Status: He is alert.           Visit Vitals  /80   Pulse 88   Ht 170.2 cm (67\")   Wt 97.5 kg (215 lb)   SpO2 98%   BMI 33.67 kg/m²     Body mass index is 33.67 kg/m².      Assessment/Plan   Moshe was seen today for rash.    Diagnoses and all orders for this visit:    Skin lesions    Folliculitis    Other orders  -     clindamycin (Cleocin) 300 MG capsule; Take 1 capsule by mouth 3 (Three) Times a Day.  -     FluLaval Quad >6 Months ([id number])    Keep the next appointment.  Answers for HPI/ROS submitted by the patient on 9/17/2020   What is the primary reason for your visit?: Other  Please describe your symptoms.: I have got about 5 spots on my head and neck that are red. a couple of them are itchy and sore. one is a little scabby.  Have you had these symptoms before?: No  How long have you been having these symptoms?: 1-4 days    "

## 2020-09-18 ENCOUNTER — OFFICE VISIT (OUTPATIENT)
Dept: CARDIOLOGY | Facility: CLINIC | Age: 40
End: 2020-09-18

## 2020-09-18 VITALS
HEIGHT: 67 IN | DIASTOLIC BLOOD PRESSURE: 80 MMHG | WEIGHT: 220 LBS | SYSTOLIC BLOOD PRESSURE: 126 MMHG | BODY MASS INDEX: 34.53 KG/M2 | OXYGEN SATURATION: 100 % | HEART RATE: 73 BPM

## 2020-09-18 DIAGNOSIS — E66.09 CLASS 2 OBESITY DUE TO EXCESS CALORIES WITHOUT SERIOUS COMORBIDITY WITH BODY MASS INDEX (BMI) OF 35.0 TO 35.9 IN ADULT: ICD-10-CM

## 2020-09-18 DIAGNOSIS — G47.33 OBSTRUCTIVE SLEEP APNEA SYNDROME: ICD-10-CM

## 2020-09-18 DIAGNOSIS — I31.39 PERICARDIAL EFFUSION: ICD-10-CM

## 2020-09-18 DIAGNOSIS — I10 ESSENTIAL HYPERTENSION: ICD-10-CM

## 2020-09-18 DIAGNOSIS — Q20.8 ABNORMALITY OF RIGHT VENTRICLE OF HEART: ICD-10-CM

## 2020-09-18 DIAGNOSIS — I27.20 PULMONARY HYPERTENSION (HCC): Primary | ICD-10-CM

## 2020-09-18 DIAGNOSIS — I34.0 NON-RHEUMATIC MITRAL REGURGITATION: ICD-10-CM

## 2020-09-18 DIAGNOSIS — R94.2 ABNORMAL PFTS (PULMONARY FUNCTION TESTS): ICD-10-CM

## 2020-09-18 PROCEDURE — 99214 OFFICE O/P EST MOD 30 MIN: CPT | Performed by: INTERNAL MEDICINE

## 2020-09-18 NOTE — PROGRESS NOTES
Cardiovascular Medicine   Nick Lao M.D., Ph.D., [facility]      The patient returns to cardiovascular clinic for follow-up of the following:    PROBLEM LIST:  1. PAH  2.  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  Mitral regurgitation  4. Pericardial Effusion  5.  HEAVEN on CPAP    Nicholas Duane Stovall is a 40 y.o. male who returns to clinic today in follow-up.       Pulmonary Hypertension  He presents for evaluation and treatment of pulmonary hypertension. Symptoms include dyspnea on exertion.   He does not have had recent travel. Weight has been stable. Appetite has been unaffected. Symptoms are exacerbated by any exercise. Symptoms are alleviated by rest. 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  They report they can ambulate and sleep. The patient endorses limitations in the ability to N/A. Their reported METS is: >=4 METS with Symptoms. They report their capacity is stable since their last visit. They have not had ED visits or inpatient admissions for PAH-related issues. They have not had a recent 6MWT.  The do not have early abdominal satiety or ascites. They do remain in clinical surveillance.     This showed an abnormal RVOT AT suggestive of PAH. He does have HEAVEN and is CPAP compliant. He still push mows a steep hill. He does not always have dyspnea. He has noted the dyspnea is associated more with the humidity than the actual activity. His RVOT AT was abnormal for the first time last time, so he was sent for PFTs. Results were reviewed today.     RV abnormality  The last 2D TTE did show RV dysfunction. The RV cavity was dilated. The RV EF was normal. The TTE was also remarkable for elevated pulmonary pressures. Injection of agitated saline was performed. The patient has not had a RHC. The patient has not had a NIKO. The patient denies  early abdominal satiety. The patient denies  ascites. The last TTE was  consistent with a RV that was RV adapted.      Valve Disease  Nicholas Duane Stovall is a 40 y.o. male who presents for follow-up of mitral valve disease.  Current status: mild MR.  The patient has not had valve surgery.  The patient does not have a history of rheumatic fever. The patient does not need endocarditis prophylaxis.   The patient denies fatigue, palpitations, hemoptysis, syncope and atrial fibrillation. The patient endorses dyspnea.   The patient's TTE is Up to date. . They do remain in clinical surveillance.     Pericardial Effusion  He has had two 2D echocardiograms showing a small pericardial effusion.  No prior autoimmune history.  His last 2D echocardiogram showed stability.  He remains asymptomatic.    The following portions of the patient's history were reviewed and updated as appropriate: allergies, current medications, past family history, past medical history, past social history, past surgical history and problem list.        Review of Systems   Cardiovascular: Negative for chest pain, claudication, cyanosis, irregular heartbeat, leg swelling, near-syncope, orthopnea, palpitations, paroxysmal nocturnal dyspnea and syncope.   Respiratory: Negative for cough, hemoptysis and shortness of breath.      family history includes Breast cancer in an other family member; Cancer in his maternal grandmother and mother; Diabetes in an other family member; Heart disease in an other family member; Hyperlipidemia in his mother; Hypertension in his mother; No Known Problems in his father; Other in his mother; Rheum arthritis in his mother; Stroke in his maternal grandmother and mother.   reports that he has never smoked. He has never used smokeless tobacco. He reports that he does not drink alcohol or use drugs.  Allergies   Allergen Reactions   • Abilify [Aripiprazole] Confusion and Hallucinations   • Lexapro [Escitalopram] Other (See Comments)     Other reaction(s): JERKING   • Perphenazine Other (See  Comments)     Sleep walking   • Depakote [Valproic Acid] Other (See Comments)     Other reaction(s): Other (See Comments)  Other reaction(s): TREMORS  Other reaction(s): TREMORS   • Mirtazapine Other (See Comments)     Other reaction(s): Other (See Comments)  Other reaction(s): JERKING IN SLEEP  Other reaction(s): JERKING IN SLEEP   • Phenobarbital Confusion and Hallucinations     Hyper, Hallucination       Current Outpatient Medications:   •  amLODIPine (NORVASC) 10 MG tablet, TAKE ONE TABLET BY MOUTH EVERY DAY, Disp: 30 tablet, Rfl: 5  •  busPIRone (BUSPAR) 10 MG tablet, Take 10 mg by mouth 2 (Two) Times a Day., Disp: , Rfl:   •  carBAMazepine XR (TEGRETOL-XR) 200 MG 12 hr tablet, Take 200 mg by mouth 2 (two) times a day. Med Name: Tegretol  mg tablet,extended release; Indication: seizures, Disp: , Rfl:   •  citalopram (CELEXA) 20 MG tablet, Take 40 mg by mouth daily. Indication: depression, Disp: , Rfl:   •  clindamycin (Cleocin) 300 MG capsule, Take 1 capsule by mouth 3 (Three) Times a Day., Disp: 30 capsule, Rfl: 0  •  cyclobenzaprine (FLEXERIL) 5 MG tablet, TAKE 1 TABLET BY MOUTH 3 (THREE) TIMES A DAY AS NEEDED FOR MUSCLE SPASMS., Disp: 90 tablet, Rfl: 1  •  levETIRAcetam (KEPPRA) 500 MG tablet, Take  by mouth Daily. 3 tabs AM, 3 tabs PM; Indication: seizures, Disp: , Rfl:   •  metoprolol tartrate (LOPRESSOR) 50 MG tablet, TAKE ONE TABLET BY MOUTH TWO TIMES A DAY, Disp: 60 tablet, Rfl: 5  •  minocycline (MINOCIN,DYNACIN) 100 MG capsule, Take 100 mg by mouth Daily., Disp: , Rfl:   •  Multiple Vitamins-Minerals (MULTIVITAMIN ADULT PO), Take 1 tablet by mouth Daily., Disp: , Rfl:   •  primidone (MYSOLINE) 50 MG tablet, Take 3 tablets by mouth Daily. 3 nightly, Disp: , Rfl:   •  risperiDONE (RISPERDAL) 4 MG tablet, TAKE 2 MG IN THE MORNING AND 6 MG IN THE EVENING, Disp: , Rfl:     Physical Exam:  Vitals:    09/18/20 0923   BP: 126/80   Pulse: 73   SpO2: 100%     Body mass index is 34.46 kg/m².   Last  Weights:   Wt Readings from Last 3 Encounters:   09/18/20 99.8 kg (220 lb)   09/17/20 97.5 kg (215 lb)   07/27/20 98.1 kg (216 lb 3 oz)     Pulse Ox: Normal   General: alert, appears stated age and cooperative  Body Habitus: obese  HEENT: Head: Normocephalic, no lesions, without obvious abnormality. No arcus senilis, xanthelasma or xanthomas.  JVP: 6 cm of water at 45 degrees   Volume/Pulsation: Normal  Heart rate: normal    Heart Rhythm: regular     Heart Sounds: S1: normal intensity  S2: normal intensity  S3: absent   S4: absent  Opening Snap: absent  A2-OS:  absent.   Pericardial rub: absent    Ejection click: None      Murmurs:  absent   Extremity: moves all extremities equally.       DATA REVIEWED:                 TTE/NIKO:  Results for orders placed in visit on 10/28/19   Adult Transthoracic Echo Limited W/ Cont if Necessary Per Protocol    Narrative · Left ventricle systolic function is normal at 56-60%.  · Right ventricle systolic function is normal. Right ventricular cavity is   mildly dilated with hypertrophy  · Mild mitral regurgitation.  · The RVOT AT is abnormal and suggestive of pulmonary hypertension.  · Trivial pericardial effusion.                --------------------------------------------------------------------------------------------------  LABS:   Lab Results   Component Value Date    GLUCOSE 93 07/27/2020    BUN 6 07/27/2020    CREATININE 0.77 07/27/2020    EGFRIFNONA 111 07/27/2020    BCR 7.8 07/27/2020    K 4.7 07/27/2020    CO2 29.5 (H) 07/27/2020    CALCIUM 9.4 07/27/2020    ALBUMIN 4.80 07/27/2020    LABIL2 2.5 09/25/2019    AST 27 07/27/2020    ALT 29 07/27/2020     Lab Results   Component Value Date    WBC 4.63 07/27/2020    HGB 13.0 07/27/2020    HCT 36.8 (L) 07/27/2020    MCV 82.1 07/27/2020     07/27/2020     Lab Results   Component Value Date    CHOL 152 07/27/2020    CHLPL 169 01/13/2017    TRIG 101 07/27/2020    HDL 35 (L) 07/27/2020    LDL 97 07/27/2020     No results found  for: TSH, V3YALZJ, U0JAPMC, THYROIDAB  No results found for: CKTOTAL, CKMB, CKMBINDEX, TROPONINI, TROPONINT  No results found for: HGBA1C  No results found for: DDIMER  Lab Results   Component Value Date    ALT 29 07/27/2020     No results found for: HGBA1C  Lab Results   Component Value Date    CREATININE 0.77 07/27/2020     No results found for: IRON, TIBC, FERRITIN  Lab Results   Component Value Date    INR 0.9 02/09/2016    INR 0.9 12/17/2015    PROTIME 11.9 02/09/2016    PROTIME 12.5 12/17/2015       Assessment/Plan     1. PAH. PAH/PVH. WHO Group 3.3; FC: II.  RV status: Abnormal.  Patient appears euvolemic. Perfusion status: good.  I suspect this is from HEAVEN. He also has recent abnormal PFTs, which need investigation.   -Discussed evaluation, treatment and usual course.  -No current indication for PAH-specific medications  -No compelling indication at this time for RHC  -Will continue active clinical surveillance.  Signs and symptoms of worsening PAH and RV failure were discussed.  -I have asked the patient that if they were to move to be certain they see someone with expertise in PAH. These providers can be located at [email or web address].  -TTE due 6/2021    2. Nonrheumatic mitral valve regurgitation. ACC stage B.  There are no surgical indications at this time.  · The patient has been advised to remain in clinical surveillance every 12 months.  · Signs and symptoms of worsening valve disease discussed.  I've asked the patient contact me for an earlier appointment if these develop.  · I've recommended a repeat 2D TTE every 3 years.  · TTE due: 2023.  No indication based on 2017 ACC/AHA guidelines for IE prophylaxis for dental procedures: Optimal oral health is recommended through regular professional dental care and the use of appropriate dental products, such as manual, powered, and ultrasonic toothbrushes; dental floss; and other plaque-removal devices    3.  Pericardial effusion. The prevalence of  pericardial effusion is about 3%.  The patient is currently Asymptomatic.  The size of the effusion is small.  I briefly discussed the etiology of pericardial effusions.  A handout was also offered to the patient which explained the disease process, including signs of worsening pericardial effusion.  At this point I have counseled conservative management.   -Will repeat a 2D TTE in 1 year: 6/2021  -Please call for worsening signs or symptoms.    4. HEAVEN.   The patient has documented HEAVEN with PSG. HEAVEN is an important modifiable CV and PAH risk factor.   -Avoid ETOH, weight reduction  -Treatment of HTN, per PCP  -1. Patient has been compliant with CPAP use and 2. I discussed the importance of treating HEAVEN given known association of untreated arrythmias    5. Abnormal PFTs. He does have significant secondhand exposure to smoke with grandparents and his Mother. PFTs reviewed. There is obstructive pattern.  -Pulm referral    Return in about 3 months (around 12/18/2020).

## 2020-09-18 NOTE — PATIENT INSTRUCTIONS
Pulmonary Hypertension  Pulmonary hypertension is a long-term (chronic) condition in which there is high blood pressure in the arteries in the lungs (pulmonary arteries). This condition occurs when pulmonary arteries become narrow and tight, making it harder for blood to flow through the lungs. This in turn makes the heart work harder to pump blood through the lungs, making it harder for you to breathe.  Over time, pulmonary hypertension can weaken and damage the heart muscle, specifically the right side of the heart. Pulmonary hypertension is a serious condition that can be life-threatening.  What are the causes?  This condition may be caused by different medical conditions. It can be categorized by cause into five groups:  · Group 1: Pulmonary hypertension that is caused by abnormal growth of small blood vessels in the lungs (pulmonary arterial hypertension). The abnormal blood vessel growth may have no known cause, or it may be:  ? Passed from parent to child (hereditary).  ? Caused by another disease, such as a connective tissue disease (including lupus or scleroderma), congenital heart disease, liver disease, or HIV.  ? Caused by certain medicines or poisons (toxins).  · Group 2: Pulmonary hypertension that is caused by weakness of the left chamber of the heart (left ventricle) or heart valve disease.  · Group 3: Pulmonary hypertension that is caused by lung disease or low oxygen levels. Causes in this group include:  ? Emphysema or chronic obstructive pulmonary disease (COPD).  ? Untreated sleep apnea.  ? Pulmonary fibrosis.  ? Long-term exposure to high altitudes in certain people who may already be at higher risk for pulmonary hypertension.  · Group 4: Pulmonary hypertension that is caused by blood clots in the lungs (pulmonary emboli).  · Group 5: Other causes of pulmonary hypertension, such as sickle cell anemia, sarcoidosis, tumors pressing on the pulmonary arteries, and various other diseases.  What are  the signs or symptoms?  Symptoms of this condition include:  · Shortness of breath. You may notice shortness of breath with:  ? Activity, such as walking.  ? Minimal activity, such as getting dressed.  ? No activity, like when you are sitting still.  · A cough. Sometimes, bloody mucus from the lungs may be coughed up (hemoptysis).  · Tiredness and fatigue.  · Dizziness, lightheadedness, or fainting, especially with physical activity.  · Rapid heartbeat, or feeling your heart flutter or skip a beat (palpitations).  · Veins in the neck getting larger.  · Swelling of the lower legs, abdomen, or both.  · Bluish color of the lips and fingertips.  · Chest pain or tightness in the chest.  · Abdominal pain, especially in the upper abdomen.  How is this diagnosed?  This condition may be diagnosed based on one or more of the following tests:  · Chest X-ray.  · Blood tests.  · CT scan.  · Pulmonary function test. This test measures how much air your lungs can hold. It also tests how well air moves in and out of your lungs.  · 6-minute walk test. This tests how severe your condition is in relation to your activity levels.  · Electrocardiogram (ECG). This test records the electrical impulses of the heart.  · Echocardiogram. This test uses sound waves (ultrasound) to produce an image of the heart.  · Cardiac catheterization. This is a procedure in which a thin tube (catheter) is passed into the pulmonary artery and used to test the pressure in your pulmonary artery and the right side of your heart.  · Lung biopsy. This involves having a procedure to remove a small sample of lung tissue for testing. This may help determine an underlying cause of your pulmonary hypertension.  How is this treated?  There is no cure for this condition, but treatment can help to relieve symptoms and slow the progress of the condition. Treatment may include:  · Cardiac rehabilitation. This is a treatment program that includes exercise training,  education, and counseling to help you get stronger and return to an active lifestyle.  · Oxygen therapy.  · Medicines that:  ? Lower blood pressure.  ? Relax (dilate) the pulmonary blood vessels.  ? Help the heart beat more efficiently and pump more blood.  ? Help the body get rid of extra fluid (diuretics).  ? Thin the blood in order to prevent blood clots in the lungs.  · Lung surgery to relieve pressure on the heart, for severe cases that do not respond to medical treatment.  · Heart-lung transplant, or lung transplant. This may be done in very severe cases.  Follow these instructions at home:  Eating and drinking    · Eat a healthy diet that includes plenty of fresh fruits and vegetables, whole grains, and beans.  · Limit your salt (sodium) intake to less than 2,300 mg a day.  Lifestyle  · Do not use any products that contain nicotine or tobacco, such as cigarettes and e-cigarettes. If you need help quitting, ask your health care provider.  · Avoid secondhand smoke.  Activity  · Get plenty of rest.  · Exercise as directed. Talk with your health care provider about what type of exercise is safe for you.  · Avoid hot tubs and saunas.  · Avoid high altitudes.  General instructions  · Take over-the-counter and prescription medicines only as told by your health care provider. Do not change or stop medicines without checking with your health care provider.  · Stay up to date on your vaccines, especially yearly flu (influenza) and pneumonia vaccines.  · If you are a woman of child-bearing age, avoid becoming pregnant. Talk with your health care provider about birth control.  · Consider ways to get support for anxiety and stress of living with pulmonary hypertension. Talk with your health care provider about support groups and online resources.  · Use oxygen therapy at home as directed.  · Keep track of your weight. Weight gain could be a sign that your condition is getting worse.  · Keep all follow-up visits as told by  your health care provider. This is important.  Contact a health care provider if:  · Your cough gets worse.  · You have more shortness of breath than usual, or you start to have trouble doing activities that you could do before.  · You need to use medicines or oxygen more frequently or in higher dosages than usual.  Get help right away if:  · You have severe shortness of breath.  · You have chest pain or pressure.  · You cough up blood.  · You have swelling of your feet or legs that gets worse.  · You have rapid weight gain over a period of 1-2 days.  · Your medicines or oxygen do not provide relief.  Summary  · Pulmonary hypertension is a chronic condition in which there is high blood pressure in the arteries in the lungs (pulmonary arteries).  · Pulmonary hypertension is a serious condition that can be life-threatening. It can be caused by a variety of illnesses.  · Treatment may involve taking medicines and using oxygen therapy. Severe cases may require surgery or a transplant.  This information is not intended to replace advice given to you by your health care provider. Make sure you discuss any questions you have with your health care provider.  Document Released: 10/14/2008 Document Revised: 11/30/2018 Document Reviewed: 03/13/2018  [employer] Patient Education © 2020 [employer]

## 2020-09-22 ENCOUNTER — OFFICE VISIT (OUTPATIENT)
Dept: PULMONOLOGY | Facility: CLINIC | Age: 40
End: 2020-09-22

## 2020-09-22 VITALS
OXYGEN SATURATION: 98 % | DIASTOLIC BLOOD PRESSURE: 72 MMHG | SYSTOLIC BLOOD PRESSURE: 140 MMHG | HEART RATE: 74 BPM | WEIGHT: 223 LBS | HEIGHT: 67 IN | BODY MASS INDEX: 35 KG/M2

## 2020-09-22 DIAGNOSIS — R94.2 ABNORMAL PFTS (PULMONARY FUNCTION TESTS): Primary | ICD-10-CM

## 2020-09-22 DIAGNOSIS — E66.09 CLASS 2 OBESITY DUE TO EXCESS CALORIES WITHOUT SERIOUS COMORBIDITY WITH BODY MASS INDEX (BMI) OF 35.0 TO 35.9 IN ADULT: ICD-10-CM

## 2020-09-22 DIAGNOSIS — G47.33 OBSTRUCTIVE SLEEP APNEA SYNDROME: ICD-10-CM

## 2020-09-22 PROCEDURE — 99203 OFFICE O/P NEW LOW 30 MIN: CPT | Performed by: INTERNAL MEDICINE

## 2020-09-22 NOTE — PROGRESS NOTES
Pulmonary Consultation    Nick Lao,*,    Thank you for asking me to see Nicholas Duane Stovall for   Chief Complaint   Patient presents with   • abnormal PFT   .      History of Present Illness  Nicholas Duane Stovall is a 40 y.o. male with a PMH significant for obesity, HEAVEN, pulmonary hypertension, epilepsy, and schizophrenia who presents for evaluation of abnormal PFTs.    9/20/2020: Pt states Dr. Lao told him he has a blockage and he recommended a pulmonary evaluation. He states he had PFTs done and they were abnormal. Pt reports he had to start seeing Dr. Lao prior to having a VNS placed a few years ago. He was told that he had a blockage on his echo as well. Pt was not started on any medications. He reports he only has RODRIGUEZ when he has to mow a large hill when it is humid. Pt denies dyspnea at any other time. He does sometimes have a cough drinking liquids but he denies persistent cough. He denies wheeze, sputum or chest pain. Pt is compliant with a CPAP.  He denies any prior lung disease.  Patient has not had seizures since 2018.  He is a never smoker but he did have second hand exposure. Pt worked at [employer] and works now as a wood worker (uses respirator). His mother had COPD and CVA (smoker).       Review of Systems: History obtained from chart review and the patient.  Review of Systems   Constitutional: Negative for fever and unexpected weight change.   HENT: Negative for dental problem.    Respiratory: Positive for cough and shortness of breath. Negative for wheezing.    Cardiovascular: Negative for chest pain and leg swelling.   Gastrointestinal: Negative for abdominal pain.   Neurological: Negative for seizures.     As described in the HPI. Otherwise, remainder of ROS (14 systems) were negative.    Patient Active Problem List   Diagnosis   • Schizophrenia (CMS/[facility])   • Sleep apnea   • Panic disorder   • Need for immunization against influenza   • Essential hypertension   •  Epilepsy (CMS/HCC)   • Chronic depression   • Anemia   • Abnormality of right ventricle of heart   • Non-rheumatic mitral regurgitation   • Class 2 obesity due to excess calories without serious comorbidity with body mass index (BMI) of 35.0 to 35.9 in adult   • Other convulsions   • Sleep walking   • Epilepsy (CMS/HCC)   • Panic attacks   • Mental disorder   • Psychosis (CMS/HCC)   • Schizophrenia (CMS/HCC)   • Pericardial effusion   • Pulmonary hypertension (CMS/HCC)   • Abnormal PFTs (pulmonary function tests)         Current Outpatient Medications:   •  amLODIPine (NORVASC) 10 MG tablet, TAKE ONE TABLET BY MOUTH EVERY DAY, Disp: 30 tablet, Rfl: 5  •  busPIRone (BUSPAR) 10 MG tablet, Take 10 mg by mouth 2 (Two) Times a Day., Disp: , Rfl:   •  carBAMazepine XR (TEGRETOL-XR) 200 MG 12 hr tablet, Take 200 mg by mouth 2 (two) times a day. Med Name: Tegretol  mg tablet,extended release; Indication: seizures, Disp: , Rfl:   •  citalopram (CELEXA) 20 MG tablet, Take 40 mg by mouth daily. Indication: depression, Disp: , Rfl:   •  clindamycin (Cleocin) 300 MG capsule, Take 1 capsule by mouth 3 (Three) Times a Day., Disp: 30 capsule, Rfl: 0  •  cyclobenzaprine (FLEXERIL) 5 MG tablet, TAKE 1 TABLET BY MOUTH 3 (THREE) TIMES A DAY AS NEEDED FOR MUSCLE SPASMS., Disp: 90 tablet, Rfl: 1  •  levETIRAcetam (KEPPRA) 500 MG tablet, Take  by mouth Daily. 3 tabs AM, 3 tabs PM; Indication: seizures, Disp: , Rfl:   •  metoprolol tartrate (LOPRESSOR) 50 MG tablet, TAKE ONE TABLET BY MOUTH TWO TIMES A DAY, Disp: 60 tablet, Rfl: 5  •  minocycline (MINOCIN,DYNACIN) 100 MG capsule, Take 100 mg by mouth Daily., Disp: , Rfl:   •  Multiple Vitamins-Minerals (MULTIVITAMIN ADULT PO), Take 1 tablet by mouth Daily., Disp: , Rfl:   •  primidone (MYSOLINE) 50 MG tablet, Take 3 tablets by mouth Daily. 3 nightly, Disp: , Rfl:   •  risperiDONE (RISPERDAL) 4 MG tablet, TAKE 2 MG IN THE MORNING AND 6 MG IN THE EVENING, Disp: , Rfl:     Allergies      Allergen Reactions   • Abilify [Aripiprazole] Confusion and Hallucinations   • Lexapro [Escitalopram] Other (See Comments)     Other reaction(s): JERKING   • Perphenazine Other (See Comments)     Sleep walking   • Depakote [Valproic Acid] Other (See Comments)     Other reaction(s): Other (See Comments)  Other reaction(s): TREMORS  Other reaction(s): TREMORS   • Mirtazapine Other (See Comments)     Other reaction(s): Other (See Comments)  Other reaction(s): JERKING IN SLEEP  Other reaction(s): JERKING IN SLEEP   • Phenobarbital Confusion and Hallucinations     Hyper, Hallucination       Past Medical History:   Diagnosis Date   • Abnormal electrocardiogram     Abnormal electrocardiogram [ECG] [EKG]     • Abnormal result of cardiovascular function study     Abnormal result of other cardiovascular function study   • Acute bronchitis    • Allergic rhinitis    • Anemia    • Burn of mouth and pharynx    • Chronic depression    • Chronic undifferentiated schizophrenia (CMS/HCC)    • Common cold     Common cold - improving      • Contact dermatitis    • Cough    • Encounter for surgical aftercare following surgery of circulatory system     Encounter for surgical aftercare following surgery on the circulatory system - VNS Implant 2/16/16   • Epidermoid cyst    • Epilepsy (CMS/HCC)    • Epistaxis    • Essential hypertension    • History of echocardiogram 12/31/2015    Echocardiogram W/ color flow 55615 (1) - Mildly dilated left ventricle with normal function with Ef of 55-60%.Mildly dilated right ventricle with normal function. right ventricular EF of 50%.Diastolic function normal.No significant valvular regurg or stenosis.   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, intractable, with status epilepticus (CMS/HCC)     Localization-related (focal) (partial) idiopathic epilepsy and epileptic  "syndromes with seizures of localized onset, intractable, without status epilepticus   • Multiple acquired skin tags     Multiple skin tags - Chronically irritated   • Need for immunization against influenza     Needs influenza immunization      • Panic disorder    • Renal function test abnormal     Renal function tests abnormal   • Schizophrenia (CMS/HCC)     Schizophrenia, unspecified   • Sebaceous cyst    • Sleep apnea    • Snoring    • Tinea cruris    • Upper respiratory infection      Past Surgical History:   Procedure Laterality Date   • EXCISION LESION  05/25/2015    Destruction of Benign Lesion (1-14) 97629 (2) - KRYSTAL OLVERA (General Surgery)    • INJECTION OF MEDICATION  06/20/2015    Kenalog (1) - SAFIA KNIGHT ([facility])    • OTHER SURGICAL HISTORY  02/16/2016    Implant neuroreceiver (1) - Implantation of VNS electrode array & impulse generator [employer] model 106 serial 40522 lead model 303 serial [id number] with excision of cervical lymph nodes.   • WOUND CLOSURE  05/10/2015    Layer Closure of Wound TR-EXT 2.6-7.5 CM 18446 (1) - KRYSTAL OLVERA (General Surgery)      Social History     Socioeconomic History   • Marital status: Single     Spouse name: Not on file   • Number of children: Not on file   • Years of education: Not on file   • Highest education level: Not on file   Tobacco Use   • Smoking status: Never Smoker   • Smokeless tobacco: Never Used   Substance and Sexual Activity   • Alcohol use: No   • Drug use: No     Family History   Problem Relation Age of Onset   • Hyperlipidemia Mother    • Hypertension Mother    • Other Mother         Respiratory disorder   • Rheum arthritis Mother    • Stroke Mother    • Cancer Mother    • No Known Problems Father    • Breast cancer Other    • Diabetes Other    • Heart disease Other    • Stroke Maternal Grandmother    • Cancer Maternal Grandmother    • Seizures Neg Hx           Objective     Blood pressure 140/72, pulse 74, height 170.2 cm (67\"), weight 101 kg (223 lb), SpO2 " 98 %.  Physical Exam  Vitals signs and nursing note reviewed.   Constitutional:       Appearance: Normal appearance. He is well-developed. He is obese.   HENT:      Head: Normocephalic and atraumatic.      Nose: Nose normal.      Mouth/Throat:      Pharynx: Uvula midline.      Comments: Mallampati 3  Eyes:      General: Lids are normal.      Conjunctiva/sclera: Conjunctivae normal.      Pupils: Pupils are equal, round, and reactive to light.   Neck:      Musculoskeletal: Normal range of motion.      Thyroid: No thyroid mass.      Trachea: Trachea normal. No tracheal tenderness.   Cardiovascular:      Rate and Rhythm: Normal rate and regular rhythm.      Chest Wall: PMI is not displaced.      Heart sounds: Normal heart sounds. No murmur. No gallop.    Pulmonary:      Effort: Pulmonary effort is normal. No respiratory distress.      Breath sounds: Normal breath sounds. No decreased breath sounds, wheezing or rhonchi.   Chest:      Chest wall: No tenderness.   Abdominal:      General: Abdomen is protuberant. Bowel sounds are normal.      Palpations: Abdomen is soft. There is no hepatomegaly.      Tenderness: There is no abdominal tenderness.   Musculoskeletal:      Comments: Normal gait, no extremity edema   Lymphadenopathy:      Head:      Right side of head: No submandibular adenopathy.      Left side of head: No submandibular adenopathy.      Cervical: No cervical adenopathy.      Upper Body:      Right upper body: No supraclavicular adenopathy.      Left upper body: No supraclavicular adenopathy.   Skin:     General: Skin is warm and dry.      Findings: No rash.      Nails: There is no clubbing.     Neurological:      Mental Status: He is alert and oriented to person, place, and time.   Psychiatric:         Speech: Speech normal.         Behavior: Behavior normal.         Judgment: Judgment normal.         PFTs: 9/22/20; 6/30/20 (independently reviewed and interpreted by me)  Ratio 74  FVC 4.05 (4.16)/ 93%  FEV1  2.99 (2.19)/ 84%  TLC 5.17/ 81%  DLCO 27.228/ 96%  Moderate obstruction. Normal lung volumes and diffusing capacity. No comparative data available.    Radiology (independently reviewed and interpreted by me): CXR 6/12/20 showed NACPD       Assessment/Plan     Moshe was seen today for abnormal pft.    Diagnoses and all orders for this visit:    Abnormal PFTs (pulmonary function tests)  -     Spirometry Without Bronchodilator    Obstructive sleep apnea syndrome    Class 2 obesity due to excess calories without serious comorbidity with body mass index (BMI) of 35.0 to 35.9 in adult         Discussion/ Recommendations:   Initial PFTs did show moderate obstruction, however, these were obtained during a time when the PFT machine was not functioning properly.  Repeat spirometry today showed normal values and no evidence of obstruction.  I do not think patient has underlying obstructive lung disease nor does he require any therapy at this time.  Otherwise, I have encouraged compliance with his CPAP and recommended efforts at weight loss through diet and exercise.    -No indication for bronchodilators.  -Continue CPAP with sleep and follow-up in the sleep center scheduled.  -Encouraged ongoing efforts at exercise as well as dietary changes to help with weight loss.  -Continued use of respirator when dealing with woodworking to avoid significant inhalational exposure.  -Annual influenza vaccination.    Patient's Body mass index is 34.93 kg/m². BMI is above normal parameters. Recommendations include: exercise counseling.           Return if symptoms worsen or fail to improve.      Thank you for allowing me to participate in the care of Nicholas Duane Stovall. Please do not hesitate to contact me with any questions.         This document has been electronically signed by Elissa Esparza MD on September 22, 2020 10:42 CDT      Dictated using Dragon

## 2020-10-01 RX ORDER — CYCLOBENZAPRINE HCL 5 MG
TABLET ORAL
Qty: 90 TABLET | Refills: 1 | Status: SHIPPED | OUTPATIENT
Start: 2020-10-01 | End: 2021-03-31

## 2020-10-28 ENCOUNTER — DOCUMENTATION (OUTPATIENT)
Dept: FAMILY MEDICINE CLINIC | Facility: CLINIC | Age: 40
End: 2020-10-28

## 2020-10-28 RX ORDER — AMLODIPINE BESYLATE 10 MG/1
TABLET ORAL
Qty: 30 TABLET | Refills: 5 | Status: SHIPPED | OUTPATIENT
Start: 2020-10-28 | End: 2021-01-21 | Stop reason: SDUPTHER

## 2020-10-29 ENCOUNTER — OFFICE VISIT (OUTPATIENT)
Dept: FAMILY MEDICINE CLINIC | Facility: CLINIC | Age: 40
End: 2020-10-29

## 2020-10-29 ENCOUNTER — TELEPHONE (OUTPATIENT)
Dept: FAMILY MEDICINE CLINIC | Facility: CLINIC | Age: 40
End: 2020-10-29

## 2020-10-29 ENCOUNTER — LAB (OUTPATIENT)
Dept: LAB | Facility: HOSPITAL | Age: 40
End: 2020-10-29

## 2020-10-29 VITALS
SYSTOLIC BLOOD PRESSURE: 152 MMHG | DIASTOLIC BLOOD PRESSURE: 72 MMHG | HEIGHT: 67 IN | RESPIRATION RATE: 18 BRPM | WEIGHT: 221.3 LBS | OXYGEN SATURATION: 98 % | HEART RATE: 80 BPM | BODY MASS INDEX: 34.73 KG/M2

## 2020-10-29 DIAGNOSIS — I10 ESSENTIAL HYPERTENSION: Primary | ICD-10-CM

## 2020-10-29 DIAGNOSIS — I10 ESSENTIAL HYPERTENSION: ICD-10-CM

## 2020-10-29 DIAGNOSIS — G40.909 NONINTRACTABLE EPILEPSY WITHOUT STATUS EPILEPTICUS, UNSPECIFIED EPILEPSY TYPE (HCC): ICD-10-CM

## 2020-10-29 PROBLEM — E78.01 FAMILIAL HYPERCHOLESTEROLEMIA: Status: ACTIVE | Noted: 2020-10-29

## 2020-10-29 LAB
ALBUMIN SERPL-MCNC: 4.9 G/DL (ref 3.5–5.2)
ALBUMIN/GLOB SERPL: 2.7 G/DL
ALP SERPL-CCNC: 98 U/L (ref 39–117)
ALT SERPL W P-5'-P-CCNC: 26 U/L (ref 1–41)
ANION GAP SERPL CALCULATED.3IONS-SCNC: 9.5 MMOL/L (ref 5–15)
AST SERPL-CCNC: 25 U/L (ref 1–40)
BASOPHILS # BLD AUTO: 0.1 10*3/MM3 (ref 0–0.2)
BASOPHILS NFR BLD AUTO: 1.9 % (ref 0–1.5)
BILIRUB SERPL-MCNC: 0.2 MG/DL (ref 0–1.2)
BUN SERPL-MCNC: 8 MG/DL (ref 6–20)
BUN/CREAT SERPL: 12.3 (ref 7–25)
CALCIUM SPEC-SCNC: 9.4 MG/DL (ref 8.6–10.5)
CHLORIDE SERPL-SCNC: 100 MMOL/L (ref 98–107)
CO2 SERPL-SCNC: 29.5 MMOL/L (ref 22–29)
CREAT SERPL-MCNC: 0.65 MG/DL (ref 0.76–1.27)
DEPRECATED RDW RBC AUTO: 37.2 FL (ref 37–54)
EOSINOPHIL # BLD AUTO: 0.03 10*3/MM3 (ref 0–0.4)
EOSINOPHIL NFR BLD AUTO: 0.6 % (ref 0.3–6.2)
ERYTHROCYTE [DISTWIDTH] IN BLOOD BY AUTOMATED COUNT: 13.1 % (ref 12.3–15.4)
GFR SERPL CREATININE-BSD FRML MDRD: 135 ML/MIN/1.73
GLOBULIN UR ELPH-MCNC: 1.8 GM/DL
GLUCOSE SERPL-MCNC: 97 MG/DL (ref 65–99)
HCT VFR BLD AUTO: 39 % (ref 37.5–51)
HGB BLD-MCNC: 13.7 G/DL (ref 13–17.7)
IMM GRANULOCYTES # BLD AUTO: 0.04 10*3/MM3 (ref 0–0.05)
IMM GRANULOCYTES NFR BLD AUTO: 0.7 % (ref 0–0.5)
LYMPHOCYTES # BLD AUTO: 1.89 10*3/MM3 (ref 0.7–3.1)
LYMPHOCYTES NFR BLD AUTO: 35.2 % (ref 19.6–45.3)
MCH RBC QN AUTO: 28.1 PG (ref 26.6–33)
MCHC RBC AUTO-ENTMCNC: 35.1 G/DL (ref 31.5–35.7)
MCV RBC AUTO: 79.9 FL (ref 79–97)
MONOCYTES # BLD AUTO: 0.68 10*3/MM3 (ref 0.1–0.9)
MONOCYTES NFR BLD AUTO: 12.7 % (ref 5–12)
NEUTROPHILS NFR BLD AUTO: 2.63 10*3/MM3 (ref 1.7–7)
NEUTROPHILS NFR BLD AUTO: 48.9 % (ref 42.7–76)
NRBC BLD AUTO-RTO: 0 /100 WBC (ref 0–0.2)
PLATELET # BLD AUTO: 150 10*3/MM3 (ref 140–450)
PMV BLD AUTO: 8.6 FL (ref 6–12)
POTASSIUM SERPL-SCNC: 4.4 MMOL/L (ref 3.5–5.2)
PROT SERPL-MCNC: 6.7 G/DL (ref 6–8.5)
RBC # BLD AUTO: 4.88 10*6/MM3 (ref 4.14–5.8)
SODIUM SERPL-SCNC: 139 MMOL/L (ref 136–145)
WBC # BLD AUTO: 5.37 10*3/MM3 (ref 3.4–10.8)

## 2020-10-29 PROCEDURE — 99214 OFFICE O/P EST MOD 30 MIN: CPT | Performed by: FAMILY MEDICINE

## 2020-10-29 PROCEDURE — 80053 COMPREHEN METABOLIC PANEL: CPT

## 2020-10-29 PROCEDURE — 85025 COMPLETE CBC W/AUTO DIFF WBC: CPT

## 2020-10-29 PROCEDURE — 36415 COLL VENOUS BLD VENIPUNCTURE: CPT

## 2020-10-29 RX ORDER — METOPROLOL TARTRATE 50 MG/1
50 TABLET, FILM COATED ORAL 2 TIMES DAILY
Qty: 60 TABLET | Refills: 5 | Status: SHIPPED | OUTPATIENT
Start: 2020-10-29 | End: 2021-01-21 | Stop reason: SDUPTHER

## 2020-10-29 NOTE — PROGRESS NOTES
Subjective   Nicholas Duane Stovall is a 40 y.o. male.   Cc: follow up of chronic medical issues    Hypertension  This is a chronic problem. The current episode started more than 1 year ago. The problem has been gradually improving since onset. Pertinent negatives include no anxiety, blurred vision, chest pain, headaches, malaise/fatigue, neck pain, orthopnea, palpitations, peripheral edema, PND, shortness of breath or sweats. Current antihypertension treatment includes calcium channel blockers and beta blockers.   Seizures- His last seizure was over two years ago. He had a normal EEG recently.    The following portions of the patient's history were reviewed and updated as appropriate: allergies, current medications, past family history, past medical history, past social history, past surgical history and problem list.    Review of Systems   Constitutional: Negative for fatigue, fever and malaise/fatigue.   Eyes: Negative for blurred vision.   Respiratory: Negative for chest tightness and shortness of breath.    Cardiovascular: Negative for chest pain, palpitations, orthopnea, leg swelling and PND.   Musculoskeletal: Negative for neck pain.   Neurological: Negative for headaches.       Objective   Physical Exam  Vitals signs reviewed.   Constitutional:       Appearance: Normal appearance.   HENT:      Head: Normocephalic and atraumatic.      Right Ear: Tympanic membrane and external ear normal.      Left Ear: Tympanic membrane and external ear normal.      Nose: Nose normal.      Mouth/Throat:      Mouth: Mucous membranes are dry.   Eyes:      Extraocular Movements: Extraocular movements intact.      Pupils: Pupils are equal, round, and reactive to light.   Neck:      Musculoskeletal: Normal range of motion and neck supple.   Cardiovascular:      Rate and Rhythm: Normal rate and regular rhythm.      Heart sounds: Normal heart sounds. No murmur. No friction rub. No gallop.    Pulmonary:      Effort: Pulmonary effort  "is normal. No respiratory distress.      Breath sounds: Normal breath sounds. No stridor. No wheezing, rhonchi or rales.   Chest:      Chest wall: No tenderness.   Abdominal:      General: Abdomen is flat. Bowel sounds are normal. There is no distension.      Palpations: Abdomen is soft.      Tenderness: There is no abdominal tenderness.   Skin:     General: Skin is warm and dry.   Neurological:      Mental Status: He is alert.           Visit Vitals  /72 (BP Location: Left arm, Patient Position: Sitting, Cuff Size: Adult)   Pulse 80   Resp 18   Ht 170.2 cm (67\")   Wt 100 kg (221 lb 4.8 oz)   SpO2 98%   BMI 34.66 kg/m²     Body mass index is 34.66 kg/m².      Assessment/Plan   Diagnoses and all orders for this visit:    1. Essential hypertension (Primary)  -     Comprehensive metabolic panel; Future  -     CBC w AUTO Differential; Future    2. Nonintractable epilepsy without status epilepticus, unspecified epilepsy type (CMS/HCC)    Other orders  -     Cancel: Lipid panel; Future  -     metoprolol tartrate (LOPRESSOR) 50 MG tablet; Take 1 tablet by mouth 2 (Two) Times a Day.  Dispense: 60 tablet; Refill: 5      Answers for HPI/ROS submitted by the patient on 10/26/2020   What is the primary reason for your visit?: Other  Please describe your symptoms.: none  Have you had these symptoms before?: No  How long have you been having these symptoms?: 1-4 days    "

## 2020-11-23 ENCOUNTER — TELEPHONE (OUTPATIENT)
Dept: FAMILY MEDICINE CLINIC | Facility: CLINIC | Age: 40
End: 2020-11-23

## 2020-11-23 NOTE — TELEPHONE ENCOUNTER
----- Message from Moshe Breen sent at 11/23/2020 12:43 PM CST -----  Regarding: Non-Urgent Medical Question  Contact: [phone]  I spoke with the DMV and My therapist/psych nurse. An MD has to sign off on paperwork for me to get the process going to get my 's license back. I need Dr. Greenberg to fill out and DMV said that the eyes since I don't wear glasses just fill out the best we can. Can I bring the paperwork in on Dec. 3 to have Dr. Greenberg fill out and fax back to the DMV in [place]. After it's filled out call me so I can call the DMV to make sure they received the FAX.  Thanks     Irvin Breen

## 2020-12-17 ENCOUNTER — OFFICE VISIT (OUTPATIENT)
Dept: CARDIOLOGY | Facility: CLINIC | Age: 40
End: 2020-12-17

## 2020-12-17 DIAGNOSIS — I31.39 PERICARDIAL EFFUSION: ICD-10-CM

## 2020-12-17 DIAGNOSIS — I27.29 PULMONARY HYPERTENSIVE VENOUS DISEASE (HCC): Primary | ICD-10-CM

## 2020-12-17 PROCEDURE — 99442 PR PHYS/QHP TELEPHONE EVALUATION 11-20 MIN: CPT | Performed by: INTERNAL MEDICINE

## 2020-12-17 NOTE — PROGRESS NOTES
Pulmonary Arterial Hypertension & Heart Failure   Nick Lao M.D., Ph.D., [facility]              Non-Face-To-Face Scheduled Telephone Service    Nicholas Duane Stovall is a 40 y.o. male who has requested telephone service in lieu of a follow-up appointment for results and advice.  This patient is an established patient.  There is no face-to-face service planned within the next 24 hours.  There also has been no E/M in the past 7 days.    You have chosen to receive care through a telephone visit. Do you consent to use a telephone visit for your medical care today? Yes     This phone visit is conducted due to COIVD. Limitations were explained, including inability to perform a physical examination and inability to perform vital signs. Patient was agreeable to proceeding with these limitations, as I personally explained them.       Nicholas Duane Stovall is a 40 y.o. male who is followed in my clinic typically.  He is followed for secondary pulmonary venous hypertension and a pericardial effusion.  He is due for a 2D echocardiogram this coming summer.  He tells me that he is stable. He denies dyspnea.     Review of Systems   Cardiovascular: Negative.    Respiratory: Negative.      family history includes Breast cancer in an other family member; Cancer in his maternal grandmother and mother; Diabetes in an other family member; Heart disease in an other family member; Hyperlipidemia in his mother; Hypertension in his mother; No Known Problems in his father; Other in his mother; Rheum arthritis in his mother; Stroke in his maternal grandmother and mother.   reports that he has never smoked. He has never used smokeless tobacco. He reports that he does not drink alcohol or use drugs.  Allergies   Allergen Reactions   • Abilify [Aripiprazole] Confusion and Hallucinations   • Lexapro [Escitalopram] Other (See Comments)     Other reaction(s): JERKING   • Perphenazine Other (See Comments)     Sleep walking   • Depakote  [Valproic Acid] Other (See Comments)     Other reaction(s): Other (See Comments)  Other reaction(s): TREMORS  Other reaction(s): TREMORS   • Mirtazapine Other (See Comments)     Other reaction(s): Other (See Comments)  Other reaction(s): JERKING IN SLEEP  Other reaction(s): JERKING IN SLEEP   • Phenobarbital Confusion and Hallucinations     Hyper, Hallucination       Current Outpatient Medications:   •  amLODIPine (NORVASC) 10 MG tablet, TAKE ONE TABLET BY MOUTH EVERY DAY, Disp: 30 tablet, Rfl: 5  •  busPIRone (BUSPAR) 10 MG tablet, Take 10 mg by mouth 2 (Two) Times a Day., Disp: , Rfl:   •  carBAMazepine XR (TEGRETOL-XR) 200 MG 12 hr tablet, Take 200 mg by mouth 2 (two) times a day. Med Name: Tegretol  mg tablet,extended release; Indication: seizures, Disp: , Rfl:   •  citalopram (CELEXA) 20 MG tablet, Take 40 mg by mouth daily. Indication: depression, Disp: , Rfl:   •  cyclobenzaprine (FLEXERIL) 5 MG tablet, TAKE 1 TABLET BY MOUTH 3 TIMES A DAY AS NEEDED FOR MUSCLE SPASMS., Disp: 90 tablet, Rfl: 1  •  levETIRAcetam (KEPPRA) 500 MG tablet, Take  by mouth Daily. 3 tabs AM, 3 tabs PM; Indication: seizures, Disp: , Rfl:   •  metoprolol tartrate (LOPRESSOR) 50 MG tablet, Take 1 tablet by mouth 2 (Two) Times a Day., Disp: 60 tablet, Rfl: 5  •  minocycline (MINOCIN,DYNACIN) 100 MG capsule, Take 100 mg by mouth Daily., Disp: , Rfl:   •  Multiple Vitamins-Minerals (MULTIVITAMIN ADULT PO), Take 1 tablet by mouth Daily., Disp: , Rfl:   •  primidone (MYSOLINE) 50 MG tablet, Take 3 tablets by mouth Daily. 3 nightly, Disp: , Rfl:   •  risperiDONE (RISPERDAL) 4 MG tablet, TAKE 2 MG IN THE MORNING AND 6 MG IN THE EVENING, Disp: , Rfl:       DATA REVIEWED:     TTE/NIKO:  Results for orders placed in visit on 10/28/19   Adult Transthoracic Echo Limited W/ Cont if Necessary Per Protocol    Narrative · Left ventricle systolic function is normal at 56-60%.  · Right ventricle systolic function is normal. Right ventricular cavity  is   mildly dilated with hypertrophy  · Mild mitral regurgitation.  · The RVOT AT is abnormal and suggestive of pulmonary hypertension.  · Trivial pericardial effusion.          --------------------------------------------------------------------------------------------------  LABS:     The 10-year CVD risk score ([place] et al., 2008) is: 9.4%    Values used to calculate the score:      Age: 40 years      Sex: Male      Diabetic: No      Tobacco smoker: No      Systolic Blood Pressure: 152 mmHg      Is BP treated: Yes      HDL Cholesterol: 35 mg/dL      Total Cholesterol: 152 mg/dL  Lab Results   Component Value Date    GLUCOSE 97 10/29/2020    BUN 8 10/29/2020    CREATININE 0.65 (L) 10/29/2020    EGFRIFNONA 135 10/29/2020    BCR 12.3 10/29/2020    K 4.4 10/29/2020    CO2 29.5 (H) 10/29/2020    CALCIUM 9.4 10/29/2020    ALBUMIN 4.90 10/29/2020    LABIL2 2.5 09/25/2019    AST 25 10/29/2020    ALT 26 10/29/2020     Lab Results   Component Value Date    WBC 5.37 10/29/2020    HGB 13.7 10/29/2020    HCT 39.0 10/29/2020    MCV 79.9 10/29/2020     10/29/2020     Lab Results   Component Value Date    CHOL 152 07/27/2020    CHLPL 169 01/13/2017    TRIG 101 07/27/2020    HDL 35 (L) 07/27/2020    LDL 97 07/27/2020     No results found for: TSH, A4LPEGX, Z3TDRGW, THYROIDAB  No results found for: CKTOTAL, CKMB, CKMBINDEX, TROPONINI, TROPONINT  No results found for: HGBA1C  No results found for: DDIMER  Lab Results   Component Value Date    ALT 26 10/29/2020     No results found for: HGBA1C  Lab Results   Component Value Date    CREATININE 0.65 (L) 10/29/2020     No results found for: IRON, TIBC, FERRITIN  Lab Results   Component Value Date    INR 0.9 02/09/2016    INR 0.9 12/17/2015    PROTIME 11.9 02/09/2016    PROTIME 12.5 12/17/2015       Assessment/Plan     1. Pulmonary hypertensive venous disease (CMS/HCC).  He appears to be stable.  I will have him follow-up this summer with an annual 2D TTE and a 6-minute  walk.  - Walking Oximetry  - Adult Transthoracic Echo Complete W/ Cont if Necessary Per Protocol    2. Pericardial effusion  - Adult Transthoracic Echo Complete W/ Cont if Necessary Per Protocol      Return in about 6 months (around 6/17/2021).        Time: >10    Nick Lao MD PhD

## 2021-01-21 ENCOUNTER — OFFICE VISIT (OUTPATIENT)
Dept: FAMILY MEDICINE CLINIC | Facility: CLINIC | Age: 41
End: 2021-01-21

## 2021-01-21 VITALS
BODY MASS INDEX: 35.48 KG/M2 | WEIGHT: 226.06 LBS | HEIGHT: 67 IN | DIASTOLIC BLOOD PRESSURE: 72 MMHG | HEART RATE: 68 BPM | OXYGEN SATURATION: 100 % | SYSTOLIC BLOOD PRESSURE: 128 MMHG

## 2021-01-21 DIAGNOSIS — F20.9 SCHIZOPHRENIA, UNSPECIFIED TYPE (HCC): Primary | ICD-10-CM

## 2021-01-21 DIAGNOSIS — F32.A CHRONIC DEPRESSION: ICD-10-CM

## 2021-01-21 PROCEDURE — 99214 OFFICE O/P EST MOD 30 MIN: CPT | Performed by: FAMILY MEDICINE

## 2021-01-21 RX ORDER — AMLODIPINE BESYLATE 10 MG/1
10 TABLET ORAL DAILY
Qty: 30 TABLET | Refills: 5 | Status: SHIPPED | OUTPATIENT
Start: 2021-01-21 | End: 2021-04-21 | Stop reason: SDUPTHER

## 2021-01-21 RX ORDER — METOPROLOL TARTRATE 50 MG/1
50 TABLET, FILM COATED ORAL 2 TIMES DAILY
Qty: 60 TABLET | Refills: 5 | Status: SHIPPED | OUTPATIENT
Start: 2021-01-21 | End: 2021-04-21 | Stop reason: SDUPTHER

## 2021-01-21 NOTE — PROGRESS NOTES
Subjective   Nicholas Duane Stovall is a 41 y.o. male.   Cc: follow up of chronic medical issues    Depression  Visit Type: follow-up  Patient presents with the following symptoms: dizziness.  Patient is not experiencing: anhedonia, chest pain, choking sensation, compulsions, confusion, decreased concentration, depressed mood, dry mouth, excessive worry, fatigue, feelings of hopelessness, feelings of worthlessness, hypersomnia, hyperventilation, insomnia, irritability, malaise, memory impairment, muscle tension, nausea, nervousness/anxiety, obsessions, palpitations, panic, psychomotor agitation, restlessness, shortness of breath, suicidal ideas, suicidal planning, thoughts of death, weight gain and weight loss.    Schizophreniia symptoms are stable.He is doing well.  He is trying to get his drivers Liscence.Dr. Dailey says that she feels that he needs a Psychiatric Evaluation before he gets his Liscence.  The following portions of the patient's history were reviewed and updated as appropriate: allergies, current medications, past family history, past medical history, past social history, past surgical history and problem list.    Review of Systems   Constitutional: Negative for irritability, weight gain and weight loss.   Respiratory: Negative for choking and shortness of breath.    Cardiovascular: Negative for palpitations.   Psychiatric/Behavioral: Negative for confusion, decreased concentration and suicidal ideas. The patient is not nervous/anxious and does not have insomnia.        Objective   Physical Exam  Vitals signs reviewed.   Constitutional:       Appearance: Normal appearance.   HENT:      Head: Normocephalic and atraumatic.      Right Ear: Tympanic membrane, ear canal and external ear normal.      Left Ear: Tympanic membrane, ear canal and external ear normal.      Nose: Nose normal.      Mouth/Throat:      Mouth: Mucous membranes are moist.      Pharynx: Oropharynx is clear.   Eyes:      Extraocular  "Movements: Extraocular movements intact.      Pupils: Pupils are equal, round, and reactive to light.   Neck:      Musculoskeletal: Normal range of motion and neck supple.   Cardiovascular:      Rate and Rhythm: Normal rate and regular rhythm.      Heart sounds: Normal heart sounds. No murmur. No friction rub. No gallop.    Pulmonary:      Effort: Pulmonary effort is normal. No respiratory distress.      Breath sounds: Normal breath sounds. No stridor. No wheezing, rhonchi or rales.   Chest:      Chest wall: No tenderness.   Abdominal:      General: Abdomen is flat. Bowel sounds are normal. There is no distension.      Tenderness: There is no abdominal tenderness. There is no guarding.   Musculoskeletal: Normal range of motion.   Skin:     General: Skin is warm and dry.   Neurological:      Mental Status: He is alert.           Visit Vitals  /72   Pulse 68   Ht 170.2 cm (67\")   Wt 103 kg (226 lb 1 oz)   SpO2 100%   BMI 35.41 kg/m²     Body mass index is 35.41 kg/m².      Assessment/Plan   Diagnoses and all orders for this visit:    1. Schizophrenia, unspecified type (CMS/HCC) (Primary)    2. Chronic depression    Other orders  -     amLODIPine (NORVASC) 10 MG tablet; Take 1 tablet by mouth Daily.  Dispense: 30 tablet; Refill: 5  -     metoprolol tartrate (LOPRESSOR) 50 MG tablet; Take 1 tablet by mouth 2 (Two) Times a Day.  Dispense: 60 tablet; Refill: 5    Return to the clinic in 3 month/s.  Will contact with results as needed.    Answers for HPI/ROS submitted by the patient on 1/18/2021   What is the primary reason for your visit?: Other  Please describe your symptoms.: check up and exam for 's license  Have you had these symptoms before?: Yes  How long have you been having these symptoms?: 1-4 days    "

## 2021-01-25 ENCOUNTER — OFFICE VISIT (OUTPATIENT)
Dept: SLEEP MEDICINE | Facility: HOSPITAL | Age: 41
End: 2021-01-25

## 2021-01-25 VITALS
OXYGEN SATURATION: 99 % | BODY MASS INDEX: 35.72 KG/M2 | HEART RATE: 72 BPM | WEIGHT: 227.6 LBS | DIASTOLIC BLOOD PRESSURE: 79 MMHG | HEIGHT: 67 IN | SYSTOLIC BLOOD PRESSURE: 131 MMHG

## 2021-01-25 DIAGNOSIS — G47.33 OBSTRUCTIVE SLEEP APNEA, ADULT: Primary | ICD-10-CM

## 2021-01-25 PROCEDURE — 99213 OFFICE O/P EST LOW 20 MIN: CPT | Performed by: NURSE PRACTITIONER
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
## 2021-03-31 ENCOUNTER — TELEPHONE (OUTPATIENT)
Dept: FAMILY MEDICINE CLINIC | Facility: CLINIC | Age: 41
End: 2021-03-31

## 2021-03-31 RX ORDER — CYCLOBENZAPRINE HCL 5 MG
TABLET ORAL
Qty: 90 TABLET | Refills: 1 | Status: SHIPPED | OUTPATIENT
Start: 2021-03-31 | End: 2021-04-21 | Stop reason: SDUPTHER

## 2021-03-31 NOTE — TELEPHONE ENCOUNTER
Pt called to ask about paperwork regarding his drivers license. Wilfredo has not rec'd this as of this time.    Please contact pt [phone]

## 2021-04-21 ENCOUNTER — LAB (OUTPATIENT)
Dept: LAB | Facility: HOSPITAL | Age: 41
End: 2021-04-21

## 2021-04-21 ENCOUNTER — OFFICE VISIT (OUTPATIENT)
Dept: FAMILY MEDICINE CLINIC | Facility: CLINIC | Age: 41
End: 2021-04-21

## 2021-04-21 VITALS
OXYGEN SATURATION: 100 % | HEART RATE: 74 BPM | WEIGHT: 228.13 LBS | HEIGHT: 67 IN | SYSTOLIC BLOOD PRESSURE: 122 MMHG | BODY MASS INDEX: 35.81 KG/M2 | DIASTOLIC BLOOD PRESSURE: 70 MMHG

## 2021-04-21 DIAGNOSIS — G40.909 SEIZURE DISORDER (HCC): ICD-10-CM

## 2021-04-21 DIAGNOSIS — F20.9 SCHIZOPHRENIA, UNSPECIFIED TYPE (HCC): ICD-10-CM

## 2021-04-21 DIAGNOSIS — I10 ESSENTIAL HYPERTENSION: ICD-10-CM

## 2021-04-21 DIAGNOSIS — I10 ESSENTIAL HYPERTENSION: Primary | ICD-10-CM

## 2021-04-21 LAB
ALBUMIN SERPL-MCNC: 4.9 G/DL (ref 3.5–5.2)
ALBUMIN/GLOB SERPL: 2.9 G/DL
ALP SERPL-CCNC: 110 U/L (ref 39–117)
ALT SERPL W P-5'-P-CCNC: 34 U/L (ref 1–41)
ANION GAP SERPL CALCULATED.3IONS-SCNC: 8.7 MMOL/L (ref 5–15)
AST SERPL-CCNC: 25 U/L (ref 1–40)
BASOPHILS # BLD AUTO: 0.1 10*3/MM3 (ref 0–0.2)
BASOPHILS NFR BLD AUTO: 2.2 % (ref 0–1.5)
BILIRUB SERPL-MCNC: 0.3 MG/DL (ref 0–1.2)
BUN SERPL-MCNC: 8 MG/DL (ref 6–20)
BUN/CREAT SERPL: 10.3 (ref 7–25)
CALCIUM SPEC-SCNC: 9 MG/DL (ref 8.6–10.5)
CHLORIDE SERPL-SCNC: 99 MMOL/L (ref 98–107)
CO2 SERPL-SCNC: 31.3 MMOL/L (ref 22–29)
CREAT SERPL-MCNC: 0.78 MG/DL (ref 0.76–1.27)
DEPRECATED RDW RBC AUTO: 39.4 FL (ref 37–54)
EOSINOPHIL # BLD AUTO: 0.04 10*3/MM3 (ref 0–0.4)
EOSINOPHIL NFR BLD AUTO: 0.9 % (ref 0.3–6.2)
ERYTHROCYTE [DISTWIDTH] IN BLOOD BY AUTOMATED COUNT: 13.9 % (ref 12.3–15.4)
GFR SERPL CREATININE-BSD FRML MDRD: 109 ML/MIN/1.73
GLOBULIN UR ELPH-MCNC: 1.7 GM/DL
GLUCOSE SERPL-MCNC: 95 MG/DL (ref 65–99)
HCT VFR BLD AUTO: 36.6 % (ref 37.5–51)
HGB BLD-MCNC: 13 G/DL (ref 13–17.7)
IMM GRANULOCYTES # BLD AUTO: 0.03 10*3/MM3 (ref 0–0.05)
IMM GRANULOCYTES NFR BLD AUTO: 0.7 % (ref 0–0.5)
LYMPHOCYTES # BLD AUTO: 1.67 10*3/MM3 (ref 0.7–3.1)
LYMPHOCYTES NFR BLD AUTO: 37.1 % (ref 19.6–45.3)
MCH RBC QN AUTO: 28.5 PG (ref 26.6–33)
MCHC RBC AUTO-ENTMCNC: 35.5 G/DL (ref 31.5–35.7)
MCV RBC AUTO: 80.3 FL (ref 79–97)
MONOCYTES # BLD AUTO: 0.57 10*3/MM3 (ref 0.1–0.9)
MONOCYTES NFR BLD AUTO: 12.7 % (ref 5–12)
NEUTROPHILS NFR BLD AUTO: 2.09 10*3/MM3 (ref 1.7–7)
NEUTROPHILS NFR BLD AUTO: 46.4 % (ref 42.7–76)
NRBC BLD AUTO-RTO: 0 /100 WBC (ref 0–0.2)
PLATELET # BLD AUTO: 157 10*3/MM3 (ref 140–450)
PMV BLD AUTO: 8.6 FL (ref 6–12)
POTASSIUM SERPL-SCNC: 4.1 MMOL/L (ref 3.5–5.2)
PROT SERPL-MCNC: 6.6 G/DL (ref 6–8.5)
RBC # BLD AUTO: 4.56 10*6/MM3 (ref 4.14–5.8)
SODIUM SERPL-SCNC: 139 MMOL/L (ref 136–145)
WBC # BLD AUTO: 4.5 10*3/MM3 (ref 3.4–10.8)

## 2021-04-21 PROCEDURE — 80053 COMPREHEN METABOLIC PANEL: CPT

## 2021-04-21 PROCEDURE — 36415 COLL VENOUS BLD VENIPUNCTURE: CPT

## 2021-04-21 PROCEDURE — 99214 OFFICE O/P EST MOD 30 MIN: CPT | Performed by: FAMILY MEDICINE

## 2021-04-21 PROCEDURE — 85025 COMPLETE CBC W/AUTO DIFF WBC: CPT

## 2021-04-21 RX ORDER — AMLODIPINE BESYLATE 10 MG/1
10 TABLET ORAL DAILY
Qty: 30 TABLET | Refills: 5 | Status: SHIPPED | OUTPATIENT
Start: 2021-04-21 | End: 2021-09-29

## 2021-04-21 RX ORDER — CYCLOBENZAPRINE HCL 5 MG
5 TABLET ORAL 3 TIMES DAILY PRN
Qty: 90 TABLET | Refills: 3 | Status: SHIPPED | OUTPATIENT
Start: 2021-04-21 | End: 2021-10-25 | Stop reason: SDUPTHER

## 2021-04-21 RX ORDER — METOPROLOL TARTRATE 50 MG/1
50 TABLET, FILM COATED ORAL 2 TIMES DAILY
Qty: 60 TABLET | Refills: 5 | Status: SHIPPED | OUTPATIENT
Start: 2021-04-21 | End: 2021-07-27

## 2021-04-21 NOTE — PROGRESS NOTES
Subjective   Nicholas Duane Stovall is a 41 y.o. male.   Cc: follow up of chronic medical issues    Hypertension  This is a chronic problem. The current episode started more than 1 year ago. The problem has been rapidly improving since onset. Associated symptoms include headaches. Pertinent negatives include no anxiety, blurred vision, chest pain, malaise/fatigue, neck pain, orthopnea, palpitations, peripheral edema, PND, shortness of breath or sweats. Current antihypertension treatment includes beta blockers.   Schizophrenia  This is a chronic problem. The current episode started more than 1 year ago. The problem occurs daily. Associated symptoms include headaches. Pertinent negatives include no abdominal pain, anorexia, arthralgias, change in bowel habit, chest pain, chills, congestion, coughing, diaphoresis, fatigue, fever, joint swelling, myalgias, nausea, neck pain, numbness, rash, sore throat, swollen glands, urinary symptoms, vertigo, visual change, vomiting or weakness.   Seizures   This is a chronic problem. Associated symptoms include headaches. Pertinent negatives include no sore throat, no chest pain, no cough, no nausea and no vomiting. Characteristics include rhythmic jerking. Characteristics do not include eye blinking, eye deviation, bowel incontinence, bladder incontinence or bit tongue.       The following portions of the patient's history were reviewed and updated as appropriate: allergies, current medications, past family history, past medical history, past social history, past surgical history and problem list.    Review of Systems   Constitutional: Negative for chills, diaphoresis, fatigue, fever and malaise/fatigue.   HENT: Negative for congestion and sore throat.    Eyes: Negative for blurred vision.   Respiratory: Negative for cough and shortness of breath.    Cardiovascular: Negative for chest pain, palpitations, orthopnea and PND.   Gastrointestinal: Negative for abdominal pain, anorexia,  "bowel incontinence, change in bowel habit, nausea and vomiting.   Genitourinary: Negative for bladder incontinence.   Musculoskeletal: Negative for arthralgias, joint swelling, myalgias and neck pain.   Skin: Negative for rash.   Neurological: Positive for seizures and headaches. Negative for vertigo, weakness and numbness.       Objective   Physical Exam  Vitals reviewed.   HENT:      Head: Normocephalic and atraumatic.      Right Ear: Tympanic membrane, ear canal and external ear normal.      Left Ear: Tympanic membrane, ear canal and external ear normal.      Nose: Nose normal.      Mouth/Throat:      Mouth: Mucous membranes are moist.      Pharynx: Oropharynx is clear.   Cardiovascular:      Rate and Rhythm: Normal rate and regular rhythm.      Heart sounds: Normal heart sounds. No murmur heard.   No friction rub. No gallop.    Pulmonary:      Effort: Pulmonary effort is normal. No respiratory distress.      Breath sounds: Normal breath sounds. No stridor. No wheezing or rales.   Chest:      Chest wall: No tenderness.   Abdominal:      General: Abdomen is flat. Bowel sounds are normal. There is no distension.      Palpations: Abdomen is soft.      Tenderness: There is no abdominal tenderness.   Musculoskeletal:      Cervical back: Normal range of motion.   Skin:     General: Skin is warm and dry.   Neurological:      Mental Status: He is alert.           Visit Vitals  /70   Pulse 74   Ht 170.2 cm (67\")   Wt 103 kg (228 lb 2 oz)   SpO2 100%   BMI 35.73 kg/m²     Body mass index is 35.73 kg/m².      Assessment/Plan   Diagnoses and all orders for this visit:    1. Essential hypertension (Primary)  -     amLODIPine (NORVASC) 10 MG tablet; Take 1 tablet by mouth Daily.  Dispense: 30 tablet; Refill: 5  -     metoprolol tartrate (LOPRESSOR) 50 MG tablet; Take 1 tablet by mouth 2 (Two) Times a Day.  Dispense: 60 tablet; Refill: 5  -     Comprehensive metabolic panel; Future  -     CBC w AUTO Differential; " Future    2. Seizure disorder (CMS/HCC)    3. Schizophrenia, unspecified type (CMS/HCC)    Other orders  -     cyclobenzaprine (FLEXERIL) 5 MG tablet; Take 1 tablet by mouth 3 (Three) Times a Day As Needed for Muscle Spasms.  Dispense: 90 tablet; Refill: 3    Return to the clinic in 3 month/s.  Will contact with results as needed.    Answers for HPI/ROS submitted by the patient on 4/14/2021  What is the primary reason for your visit?: Other  Please describe your symptoms.: none  Have you had these symptoms before?: No  How long have you been having these symptoms?: 1-4 days

## 2021-04-23 ENCOUNTER — TELEPHONE (OUTPATIENT)
Dept: FAMILY MEDICINE CLINIC | Facility: CLINIC | Age: 41
End: 2021-04-23

## 2021-04-28 ENCOUNTER — IMMUNIZATION (OUTPATIENT)
Dept: VACCINE CLINIC | Facility: HOSPITAL | Age: 41
End: 2021-04-28

## 2021-04-28 PROCEDURE — 91300 HC SARSCOV02 VAC 30MCG/0.3ML IM: CPT | Performed by: THORACIC SURGERY (CARDIOTHORACIC VASCULAR SURGERY)

## 2021-04-28 PROCEDURE — 0001A: CPT | Performed by: THORACIC SURGERY (CARDIOTHORACIC VASCULAR SURGERY)

## 2021-05-19 ENCOUNTER — IMMUNIZATION (OUTPATIENT)
Dept: VACCINE CLINIC | Facility: HOSPITAL | Age: 41
End: 2021-05-19

## 2021-05-19 PROCEDURE — 0002A: CPT | Performed by: THORACIC SURGERY (CARDIOTHORACIC VASCULAR SURGERY)

## 2021-05-19 PROCEDURE — 91300 HC SARSCOV02 VAC 30MCG/0.3ML IM: CPT | Performed by: THORACIC SURGERY (CARDIOTHORACIC VASCULAR SURGERY)

## 2021-06-28 ENCOUNTER — OFFICE VISIT (OUTPATIENT)
Dept: CARDIOLOGY | Facility: CLINIC | Age: 41
End: 2021-06-28

## 2021-06-28 VITALS
BODY MASS INDEX: 36.29 KG/M2 | HEART RATE: 65 BPM | SYSTOLIC BLOOD PRESSURE: 122 MMHG | WEIGHT: 231.2 LBS | HEIGHT: 67 IN | OXYGEN SATURATION: 99 % | DIASTOLIC BLOOD PRESSURE: 80 MMHG

## 2021-06-28 DIAGNOSIS — I31.39 PERICARDIAL EFFUSION: Primary | ICD-10-CM

## 2021-06-28 PROCEDURE — 99214 OFFICE O/P EST MOD 30 MIN: CPT | Performed by: INTERNAL MEDICINE

## 2021-06-28 PROCEDURE — 93000 ELECTROCARDIOGRAM COMPLETE: CPT | Performed by: INTERNAL MEDICINE

## 2021-06-28 RX ORDER — AMOXICILLIN 500 MG/1
500 CAPSULE ORAL DAILY
COMMUNITY
End: 2021-10-25

## 2021-06-28 NOTE — PROGRESS NOTES
[facility] Cardiology  OFFICE NOTE    Cardiovascular Medicine  Alisa Kelsey M.D., [facility], FSCAI, RPVI         No referring provider defined for this encounter.    Thank you for asking me to see Nicholas Duane Stovall for RV dilation.    History of Present Illness  This is a 41 y.o. male with:    1. PAH  2.  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  Mitral regurgitation  4. Pericardial Effusion  5.  HEAVEN on CPAP    Nicholas Duane Stovall is a 41 y.o. male who presents for consultation today.  Patient has been previously seen by Dr. Lao, he was labeled as pulmonary arterial hypertension based on echocardiogram findings of RVOT acceleration time abnormality.  He was able with WHO class III.  In the setting of sleep apnea for which he uses CPAP.  His right ventricular cavity is mildly dilated without hypertrophy.  He did not have right heart cath done for further evaluation of his pulmonary hypertension.  He also had mitral regurgitation which was mild in severity.  There is also small pericardial effusion.  He also had a stress test in October 2019 which was low risk, also had a stress echo which was without any evidence of ischemia.    No acute issues since last visit with Dr. Lao.  Has been pretty active at baseline denying any chest pain or shortness of breath.  He mows his lawn without any limitations.      Review of Systems - ROS  Constitution: Negative for weakness, weight gain and weight loss.   HENT: Negative for congestion.    Eyes: Negative for blurred vision.   Cardiovascular: As mentioned above  Respiratory: Negative for cough and hemoptysis.    Endocrine: Negative for polydipsia and polyuria.   Hematologic/Lymphatic: Negative for bleeding problem. Does not bruise/bleed easily.   Skin: Negative for flushing.   Musculoskeletal: Negative for neck pain and stiffness.   Gastrointestinal: Negative for abdominal pain, diarrhea, jaundice, melena, nausea and vomiting.   Genitourinary:  Negative for dysuria and hematuria.   Neurological: Negative for dizziness, focal weakness and numbness.   Psychiatric/Behavioral: Negative for altered mental status and depression.          All other systems were reviewed and were negative.    family history includes Breast cancer in an other family member; Cancer in his maternal grandmother and mother; Diabetes in an other family member; Heart disease in an other family member; Hyperlipidemia in his mother; Hypertension in his mother; No Known Problems in his father; Other in his mother; Rheum arthritis in his mother; Stroke in his maternal grandmother and mother.     reports that he has never smoked. He has never used smokeless tobacco. He reports that he does not drink alcohol and does not use drugs.    Allergies   Allergen Reactions   • Abilify [Aripiprazole] Confusion and Hallucinations   • Lexapro [Escitalopram] Other (See Comments)     Other reaction(s): JERKING   • Perphenazine Other (See Comments)     Sleep walking   • Depakote [Valproic Acid] Other (See Comments)     Other reaction(s): Other (See Comments)  Other reaction(s): TREMORS  Other reaction(s): TREMORS   • Mirtazapine Other (See Comments)     Other reaction(s): Other (See Comments)  Other reaction(s): JERKING IN SLEEP  Other reaction(s): JERKING IN SLEEP   • Phenobarbital Confusion and Hallucinations     Hyper, Hallucination         Current Outpatient Medications:   •  amLODIPine (NORVASC) 10 MG tablet, Take 1 tablet by mouth Daily., Disp: 30 tablet, Rfl: 5  •  amoxicillin (AMOXIL) 500 MG capsule, Take 1,000 mg by mouth Daily., Disp: , Rfl:   •  busPIRone (BUSPAR) 10 MG tablet, Take 10 mg by mouth 2 (Two) Times a Day., Disp: , Rfl:   •  carBAMazepine XR (TEGRETOL-XR) 200 MG 12 hr tablet, Take 200 mg by mouth 2 (two) times a day. Med Name: Tegretol  mg tablet,extended release; Indication: seizures, Disp: , Rfl:   •  citalopram (CELEXA) 20 MG tablet, Take 40 mg by mouth daily. Indication:  "depression, Disp: , Rfl:   •  cyclobenzaprine (FLEXERIL) 5 MG tablet, Take 1 tablet by mouth 3 (Three) Times a Day As Needed for Muscle Spasms., Disp: 90 tablet, Rfl: 3  •  levETIRAcetam (KEPPRA) 500 MG tablet, Take  by mouth Daily. 3 tabs AM, 3 tabs PM; Indication: seizures, Disp: , Rfl:   •  metoprolol tartrate (LOPRESSOR) 50 MG tablet, Take 1 tablet by mouth 2 (Two) Times a Day., Disp: 60 tablet, Rfl: 5  •  Multiple Vitamins-Minerals (MULTIVITAMIN ADULT PO), Take 1 tablet by mouth Daily., Disp: , Rfl:   •  primidone (MYSOLINE) 50 MG tablet, Take 3 tablets by mouth Daily. 3 nightly, Disp: , Rfl:   •  risperiDONE (RISPERDAL) 4 MG tablet, TAKE 2 MG IN THE MORNING AND 6 MG IN THE EVENING, Disp: , Rfl:     Physical Exam:  Vitals:    06/28/21 0827 06/28/21 0828   BP: 118/80 122/80   BP Location: Left arm Right arm   Patient Position: Sitting Sitting   Cuff Size: Adult    Pulse: 65    SpO2: 99%    Weight: 105 kg (231 lb 3.2 oz)    Height: 170.2 cm (67\")    PainSc: 0-No pain      Current Pain Level: none  Pulse Ox: Normal  on room air  General: alert, appears stated age and cooperative     Body Habitus: well-nourished    HEENT: Head: Normocephalic, no lesions, without obvious abnormality. No arcus senilis, xanthelasma or xanthomas.    Neuro: alert, oriented x3  Pulses: 2+ and symmetric  JVP: Volume/Pulsation: Normal.  Normal waveforms.   Appropriate inspiratory decrease.  No Kussmaul's. No Sim's.   Carotid Exam: no bruit normal pulsation bilaterally   Carotid Volume: normal.     Respirations: no increased work of breathing   Chest:  Normal    Pulmonary:Normal   Precordium: Normal impulses. P2 is not palpable.  RV Heave: absent  LV Heave: absent  [place]:  normal size and placement  Palpable S4: absent.  Heart rate: normal    Heart Rhythm: regular     Heart Sounds: S1: normal  S2: normal  S3: absent   S4: absent  Opening Snap: absent    Pericardial Rub:  Absent: .    Abdomen:   Appearance: normal .  Palpation: Soft, " non-tender to palpation, bowel sounds positive in all four quadrants; no guarding or rebound tenderness  Extremity: no edema.   LE Skin: no rashes  LE Hair:  normal  LE Pulses: well perfused with normal pulses in the distal extremities  Pallor on elevation: Absent. Rubor on dependency: None      DATA REVIEWED:     EKG. I personally reviewed and interpreted the EKG.  Sinus rhythm with first-degree AV block, T wave inversions noted in V1 V2 which are unchanged from before.    ECG/EMG Results (all)     Procedure Component Value Units Date/Time    ECG 12 Lead [[id number]] Collected: 06/28/21 0819     Updated: 06/28/21 0822     QT Interval 420 ms      QTC Interval 436 ms     Narrative:      Test Reason : pericardial effusion  Blood Pressure :   */*   mmHG  Vent. Rate :  65 BPM     Atrial Rate :  65 BPM     P-R Int : 250 ms          QRS Dur :  96 ms      QT Int : 420 ms       P-R-T Axes :  45  51  48 degrees     QTc Int : 436 ms    Sinus rhythm with 1st degree AV block  T wave abnormality, consider anterior ischemia  Abnormal ECG  When compared with ECG of 12-JUN-2020 08:52,  No significant change was found    Referred By:            Confirmed By:         ---------------------------------------------------  TTE/NIKO:  Results for orders placed in visit on 10/28/19    Adult Transthoracic Echo Limited W/ Cont if Necessary Per Protocol    Interpretation Summary  · Left ventricle systolic function is normal at 56-60%.  · Right ventricle systolic function is normal. Right ventricular cavity is mildly dilated with hypertrophy  · Mild mitral regurgitation.  · The RVOT AT is abnormal and suggestive of pulmonary hypertension.  · Trivial pericardial effusion.        ----------------------------------------------------      --------------------------------------------------------------------------------------------------  LABS:     The 10-year CVD risk score ([place] et al., 2008) is: 6.6%    Values used to calculate the score:       Age: 41 years      Sex: Male      Diabetic: No      Tobacco smoker: No      Systolic Blood Pressure: 122 mmHg      Is BP treated: Yes      HDL Cholesterol: 35 mg/dL      Total Cholesterol: 152 mg/dL         Lab Results   Component Value Date    GLUCOSE 95 04/21/2021    BUN 8 04/21/2021    CREATININE 0.78 04/21/2021    EGFRIFNONA 109 04/21/2021    BCR 10.3 04/21/2021    K 4.1 04/21/2021    CO2 31.3 (H) 04/21/2021    CALCIUM 9.0 04/21/2021    ALBUMIN 4.90 04/21/2021    LABIL2 2.5 09/25/2019    AST 25 04/21/2021    ALT 34 04/21/2021     Lab Results   Component Value Date    WBC 4.50 04/21/2021    HGB 13.0 04/21/2021    HCT 36.6 (L) 04/21/2021    MCV 80.3 04/21/2021     04/21/2021     Lab Results   Component Value Date    CHOL 152 07/27/2020    CHLPL 169 01/13/2017    TRIG 101 07/27/2020    HDL 35 (L) 07/27/2020    LDL 97 07/27/2020     No results found for: TSH, X5GGYGL, S1ARPYW, THYROIDAB  No results found for: CKTOTAL, CKMB, CKMBINDEX, TROPONINI, TROPONINT  No results found for: HGBA1C  No results found for: DDIMER  Lab Results   Component Value Date    ALT 34 04/21/2021     No results found for: HGBA1C  Lab Results   Component Value Date    CREATININE 0.78 04/21/2021     No results found for: IRON, TIBC, FERRITIN  Lab Results   Component Value Date    INR 0.9 02/09/2016    INR 0.9 12/17/2015    PROTIME 11.9 02/09/2016    PROTIME 12.5 12/17/2015       Assessment/Plan     1. Pericardial effusion  Small effusion.  Monitor at this point.    2.  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  Was in clinical surveillance.  He has known sleep apnea.  Also has mild obstructive disease confirmed on PFTs from secondary smoking.  Was seen by pulmonary however was told that repeat PFTs are okay.  No history of PE or DVT.  Clinically without any symptoms.    3.  Hypertension: He is on amlodipine 10 mg his blood pressure is well controlled.          Prevention:  Patient's Body mass  index is 36.21 kg/m². indicating that he is obese (BMI >30). Obesity-related health conditions include the following: obstructive sleep apnea. Obesity is newly identified. BMI is is above average; BMI management plan is completed. We discussed low calorie, low carb based diet program, portion control and increasing exercise..      Nicholas Duane Stovall  reports that he has never smoked. He has never used smokeless tobacco..        This document has been electronically signed by Alisa Kelsey MD on June 28, 2021 08:38 CDT

## 2021-07-02 LAB
QT INTERVAL: 420 MS
QTC INTERVAL: 436 MS

## 2021-07-22 ENCOUNTER — LAB (OUTPATIENT)
Dept: LAB | Facility: HOSPITAL | Age: 41
End: 2021-07-22

## 2021-07-22 ENCOUNTER — OFFICE VISIT (OUTPATIENT)
Dept: FAMILY MEDICINE CLINIC | Facility: CLINIC | Age: 41
End: 2021-07-22

## 2021-07-22 VITALS
DIASTOLIC BLOOD PRESSURE: 78 MMHG | BODY MASS INDEX: 36.1 KG/M2 | HEART RATE: 75 BPM | HEIGHT: 67 IN | WEIGHT: 230 LBS | OXYGEN SATURATION: 98 % | SYSTOLIC BLOOD PRESSURE: 138 MMHG

## 2021-07-22 DIAGNOSIS — I10 ESSENTIAL HYPERTENSION: ICD-10-CM

## 2021-07-22 DIAGNOSIS — G40.909 NONINTRACTABLE EPILEPSY WITHOUT STATUS EPILEPTICUS, UNSPECIFIED EPILEPSY TYPE (HCC): ICD-10-CM

## 2021-07-22 DIAGNOSIS — R06.89 DIFFICULTY BREATHING: ICD-10-CM

## 2021-07-22 DIAGNOSIS — I10 ESSENTIAL HYPERTENSION: Primary | ICD-10-CM

## 2021-07-22 DIAGNOSIS — F20.9 SCHIZOPHRENIA, UNSPECIFIED TYPE (HCC): ICD-10-CM

## 2021-07-22 PROCEDURE — 36415 COLL VENOUS BLD VENIPUNCTURE: CPT

## 2021-07-22 PROCEDURE — 99214 OFFICE O/P EST MOD 30 MIN: CPT | Performed by: FAMILY MEDICINE

## 2021-07-22 PROCEDURE — 85025 COMPLETE CBC W/AUTO DIFF WBC: CPT

## 2021-07-22 PROCEDURE — 80053 COMPREHEN METABOLIC PANEL: CPT

## 2021-07-22 NOTE — PROGRESS NOTES
Subjective   Nicholas Duane Stovall is a 41 y.o. male.   Cc: follow up of chronic medical issues      Hypertension  This is a chronic problem. The current episode started more than 1 year ago. The problem has been gradually improving since onset. Pertinent negatives include no anxiety, blurred vision, chest pain, headaches, malaise/fatigue, neck pain, orthopnea, palpitations, peripheral edema, PND, shortness of breath or sweats. Current antihypertension treatment includes calcium channel blockers and beta blockers.   Seizures   This is a chronic problem. Pertinent negatives include no headaches and no chest pain. Characteristics include rhythmic jerking. Characteristics do not include eye blinking, eye deviation, bowel incontinence, bladder incontinence, bit tongue, apnea or cyanosis.   Schizophrenia is stable.   He has been having episodes of having difficulty breathing. The last episode was six days ago. It feels like his airway has spasmed and it takes minutes to get his breathing back. The time before that was 3 months.    The following portions of the patient's history were reviewed and updated as appropriate: allergies, current medications, past family history, past medical history, past social history, past surgical history and problem list.    Review of Systems   Constitutional: Negative for malaise/fatigue.   Eyes: Negative for blurred vision.   Respiratory: Negative for apnea and shortness of breath.    Cardiovascular: Negative for chest pain, palpitations, orthopnea, PND and cyanosis.   Gastrointestinal: Negative for bowel incontinence.   Genitourinary: Negative for bladder incontinence.   Musculoskeletal: Negative for neck pain.   Neurological: Positive for seizures. Negative for headaches.       Objective   Physical Exam  Vitals reviewed.   Constitutional:       Appearance: Normal appearance.   HENT:      Head: Normocephalic and atraumatic.      Right Ear: Tympanic membrane, ear canal and external ear  "normal.      Left Ear: Tympanic membrane, ear canal and external ear normal.      Nose: Nose normal.      Mouth/Throat:      Mouth: Mucous membranes are moist.      Pharynx: Oropharynx is clear.   Cardiovascular:      Rate and Rhythm: Normal rate and regular rhythm.      Heart sounds: Normal heart sounds. No murmur heard.   No friction rub. No gallop.    Pulmonary:      Effort: Pulmonary effort is normal. No respiratory distress.      Breath sounds: Normal breath sounds. No stridor. No wheezing, rhonchi or rales.   Chest:      Chest wall: No tenderness.   Abdominal:      General: Abdomen is flat. Bowel sounds are normal. There is no distension.      Palpations: Abdomen is soft. There is no mass.      Tenderness: There is no abdominal tenderness. There is no guarding or rebound.      Hernia: No hernia is present.   Musculoskeletal:      Cervical back: Normal range of motion.   Skin:     General: Skin is warm and dry.   Neurological:      Mental Status: He is alert.           Visit Vitals  /78   Pulse 75   Ht 170.2 cm (67\")   Wt 104 kg (230 lb)   SpO2 98%   BMI 36.02 kg/m²     Body mass index is 36.02 kg/m².      Assessment/Plan   Diagnoses and all orders for this visit:    1. Essential hypertension (Primary)  -     Comprehensive metabolic panel; Future  -     CBC w AUTO Differential; Future    2. Schizophrenia, unspecified type (CMS/HCC)    3. Nonintractable epilepsy without status epilepticus, unspecified epilepsy type (CMS/HCC)    4. Difficulty breathing  -     Ambulatory Referral to Pulmonology    Return to the clinic in 3 month/s.  Will contact with results as needed.    Answers for HPI/ROS submitted by the patient on 7/16/2021  What is the primary reason for your visit?: Other  Please describe your symptoms.: none  Have you had these symptoms before?: No  How long have you been having these symptoms?: 1-4 days      "

## 2021-07-23 LAB
ALBUMIN SERPL-MCNC: 4.5 G/DL (ref 3.5–5.2)
ALBUMIN/GLOB SERPL: 2.8 G/DL
ALP SERPL-CCNC: 85 U/L (ref 39–117)
ALT SERPL W P-5'-P-CCNC: 40 U/L (ref 1–41)
ANION GAP SERPL CALCULATED.3IONS-SCNC: 10.1 MMOL/L (ref 5–15)
AST SERPL-CCNC: 31 U/L (ref 1–40)
BASOPHILS # BLD AUTO: 0.08 10*3/MM3 (ref 0–0.2)
BASOPHILS NFR BLD AUTO: 1.9 % (ref 0–1.5)
BILIRUB SERPL-MCNC: 0.3 MG/DL (ref 0–1.2)
BUN SERPL-MCNC: 8 MG/DL (ref 6–20)
BUN/CREAT SERPL: 9.8 (ref 7–25)
CALCIUM SPEC-SCNC: 8.7 MG/DL (ref 8.6–10.5)
CHLORIDE SERPL-SCNC: 102 MMOL/L (ref 98–107)
CO2 SERPL-SCNC: 26.9 MMOL/L (ref 22–29)
CREAT SERPL-MCNC: 0.82 MG/DL (ref 0.76–1.27)
DEPRECATED RDW RBC AUTO: 42.9 FL (ref 37–54)
EOSINOPHIL # BLD AUTO: 0.06 10*3/MM3 (ref 0–0.4)
EOSINOPHIL NFR BLD AUTO: 1.4 % (ref 0.3–6.2)
ERYTHROCYTE [DISTWIDTH] IN BLOOD BY AUTOMATED COUNT: 14.4 % (ref 12.3–15.4)
GFR SERPL CREATININE-BSD FRML MDRD: 103 ML/MIN/1.73
GLOBULIN UR ELPH-MCNC: 1.6 GM/DL
GLUCOSE SERPL-MCNC: 173 MG/DL (ref 65–99)
HCT VFR BLD AUTO: 38.4 % (ref 37.5–51)
HGB BLD-MCNC: 13.2 G/DL (ref 13–17.7)
IMM GRANULOCYTES # BLD AUTO: 0.04 10*3/MM3 (ref 0–0.05)
IMM GRANULOCYTES NFR BLD AUTO: 1 % (ref 0–0.5)
LYMPHOCYTES # BLD AUTO: 1.38 10*3/MM3 (ref 0.7–3.1)
LYMPHOCYTES NFR BLD AUTO: 33.3 % (ref 19.6–45.3)
MCH RBC QN AUTO: 28.6 PG (ref 26.6–33)
MCHC RBC AUTO-ENTMCNC: 34.4 G/DL (ref 31.5–35.7)
MCV RBC AUTO: 83.1 FL (ref 79–97)
MONOCYTES # BLD AUTO: 0.37 10*3/MM3 (ref 0.1–0.9)
MONOCYTES NFR BLD AUTO: 8.9 % (ref 5–12)
NEUTROPHILS NFR BLD AUTO: 2.21 10*3/MM3 (ref 1.7–7)
NEUTROPHILS NFR BLD AUTO: 53.5 % (ref 42.7–76)
NRBC BLD AUTO-RTO: 0 /100 WBC (ref 0–0.2)
PLATELET # BLD AUTO: 139 10*3/MM3 (ref 140–450)
PMV BLD AUTO: 8.9 FL (ref 6–12)
POTASSIUM SERPL-SCNC: 3.9 MMOL/L (ref 3.5–5.2)
PROT SERPL-MCNC: 6.1 G/DL (ref 6–8.5)
RBC # BLD AUTO: 4.62 10*6/MM3 (ref 4.14–5.8)
SODIUM SERPL-SCNC: 139 MMOL/L (ref 136–145)
WBC # BLD AUTO: 4.14 10*3/MM3 (ref 3.4–10.8)

## 2021-07-27 DIAGNOSIS — I10 ESSENTIAL HYPERTENSION: ICD-10-CM

## 2021-07-27 RX ORDER — METOPROLOL TARTRATE 50 MG/1
TABLET, FILM COATED ORAL
Qty: 60 TABLET | Refills: 5 | Status: SHIPPED | OUTPATIENT
Start: 2021-07-27 | End: 2021-10-25 | Stop reason: SDUPTHER

## 2021-07-30 ENCOUNTER — LAB (OUTPATIENT)
Dept: LAB | Facility: HOSPITAL | Age: 41
End: 2021-07-30

## 2021-07-30 ENCOUNTER — TRANSCRIBE ORDERS (OUTPATIENT)
Dept: LAB | Facility: HOSPITAL | Age: 41
End: 2021-07-30

## 2021-07-30 DIAGNOSIS — G40.109: Primary | ICD-10-CM

## 2021-07-30 DIAGNOSIS — F70 MILD INTELLECTUAL DISABILITIES: ICD-10-CM

## 2021-07-30 DIAGNOSIS — G40.109: ICD-10-CM

## 2021-07-30 DIAGNOSIS — R25.1 TREMORS OF NERVOUS SYSTEM: ICD-10-CM

## 2021-07-30 PROCEDURE — 80053 COMPREHEN METABOLIC PANEL: CPT

## 2021-07-30 PROCEDURE — 80156 ASSAY CARBAMAZEPINE TOTAL: CPT

## 2021-07-30 PROCEDURE — 85025 COMPLETE CBC W/AUTO DIFF WBC: CPT

## 2021-07-30 PROCEDURE — 36415 COLL VENOUS BLD VENIPUNCTURE: CPT

## 2021-07-30 PROCEDURE — 80177 DRUG SCRN QUAN LEVETIRACETAM: CPT

## 2021-07-31 LAB
ALBUMIN SERPL-MCNC: 4.6 G/DL (ref 3.5–5.2)
ALBUMIN/GLOB SERPL: 3.1 G/DL
ALP SERPL-CCNC: 88 U/L (ref 39–117)
ALT SERPL W P-5'-P-CCNC: 37 U/L (ref 1–41)
ANION GAP SERPL CALCULATED.3IONS-SCNC: 10.4 MMOL/L (ref 5–15)
AST SERPL-CCNC: 29 U/L (ref 1–40)
BASOPHILS # BLD AUTO: 0.08 10*3/MM3 (ref 0–0.2)
BASOPHILS NFR BLD AUTO: 1.9 % (ref 0–1.5)
BILIRUB SERPL-MCNC: 0.3 MG/DL (ref 0–1.2)
BUN SERPL-MCNC: 9 MG/DL (ref 6–20)
BUN/CREAT SERPL: 11.4 (ref 7–25)
CALCIUM SPEC-SCNC: 9 MG/DL (ref 8.6–10.5)
CARBAMAZEPINE SERPL-MCNC: 5.8 MCG/ML (ref 4–12)
CHLORIDE SERPL-SCNC: 105 MMOL/L (ref 98–107)
CO2 SERPL-SCNC: 25.6 MMOL/L (ref 22–29)
CREAT SERPL-MCNC: 0.79 MG/DL (ref 0.76–1.27)
DEPRECATED RDW RBC AUTO: 41.4 FL (ref 37–54)
EOSINOPHIL # BLD AUTO: 0.09 10*3/MM3 (ref 0–0.4)
EOSINOPHIL NFR BLD AUTO: 2.1 % (ref 0.3–6.2)
ERYTHROCYTE [DISTWIDTH] IN BLOOD BY AUTOMATED COUNT: 14.3 % (ref 12.3–15.4)
GFR SERPL CREATININE-BSD FRML MDRD: 107 ML/MIN/1.73
GLOBULIN UR ELPH-MCNC: 1.5 GM/DL
GLUCOSE SERPL-MCNC: 102 MG/DL (ref 65–99)
HCT VFR BLD AUTO: 36.5 % (ref 37.5–51)
HGB BLD-MCNC: 12.7 G/DL (ref 13–17.7)
IMM GRANULOCYTES # BLD AUTO: 0.03 10*3/MM3 (ref 0–0.05)
IMM GRANULOCYTES NFR BLD AUTO: 0.7 % (ref 0–0.5)
LYMPHOCYTES # BLD AUTO: 1.78 10*3/MM3 (ref 0.7–3.1)
LYMPHOCYTES NFR BLD AUTO: 42.5 % (ref 19.6–45.3)
MCH RBC QN AUTO: 28.3 PG (ref 26.6–33)
MCHC RBC AUTO-ENTMCNC: 34.8 G/DL (ref 31.5–35.7)
MCV RBC AUTO: 81.5 FL (ref 79–97)
MONOCYTES # BLD AUTO: 0.44 10*3/MM3 (ref 0.1–0.9)
MONOCYTES NFR BLD AUTO: 10.5 % (ref 5–12)
NEUTROPHILS NFR BLD AUTO: 1.77 10*3/MM3 (ref 1.7–7)
NEUTROPHILS NFR BLD AUTO: 42.3 % (ref 42.7–76)
NRBC BLD AUTO-RTO: 0 /100 WBC (ref 0–0.2)
PLATELET # BLD AUTO: 140 10*3/MM3 (ref 140–450)
PMV BLD AUTO: 8.9 FL (ref 6–12)
POTASSIUM SERPL-SCNC: 4.5 MMOL/L (ref 3.5–5.2)
PROT SERPL-MCNC: 6.1 G/DL (ref 6–8.5)
RBC # BLD AUTO: 4.48 10*6/MM3 (ref 4.14–5.8)
SODIUM SERPL-SCNC: 141 MMOL/L (ref 136–145)
WBC # BLD AUTO: 4.19 10*3/MM3 (ref 3.4–10.8)

## 2021-08-01 LAB — LEVETIRACETAM SERPL-MCNC: 30.8 UG/ML (ref 10–40)

## 2021-08-24 ENCOUNTER — OFFICE VISIT (OUTPATIENT)
Dept: PULMONOLOGY | Facility: CLINIC | Age: 41
End: 2021-08-24

## 2021-08-24 VITALS
HEART RATE: 73 BPM | WEIGHT: 233 LBS | TEMPERATURE: 96.9 F | SYSTOLIC BLOOD PRESSURE: 120 MMHG | DIASTOLIC BLOOD PRESSURE: 66 MMHG | BODY MASS INDEX: 36.57 KG/M2 | HEIGHT: 67 IN | OXYGEN SATURATION: 98 %

## 2021-08-24 DIAGNOSIS — J38.3 VOCAL CORD DYSFUNCTION: Primary | ICD-10-CM

## 2021-08-24 PROCEDURE — 99213 OFFICE O/P EST LOW 20 MIN: CPT | Performed by: INTERNAL MEDICINE

## 2021-08-24 NOTE — PROGRESS NOTES
Pulmonary Consultation    Calos Greenberg MD,    Thank you for asking me to see Nicholas Duane Stovall for   Chief Complaint   Patient presents with   • Shortness of Breath   .      History of Present Illness  Nicholas Duane Stovall is a 41 y.o. male   Patient reports that he has had 3-4 episodes of acute inability to breath. He feels like his throat closes, only lasts a very short period of time and then resolves on it's own. He also notes that he has a vagal nerve stimulator and sometimes when that fires, his voice changes.  He is a never smoker. He had a pulmonary evaluation by Dr Esparza last year and normal PFTs with exception of the flow volume loops which did show a flattened inspiratory limb which could be consistent with VCD    Tobacco use history:  Never smoked    Review of Systems: History obtained from chart review and the patient.  Review of Systems  As described in the HPI. Otherwise, remainder of ROS (14 systems) were negative.    Patient Active Problem List   Diagnosis   • Schizophrenia (CMS/HCC)   • Sleep apnea   • Panic disorder   • Need for immunization against influenza   • Essential hypertension   • Epilepsy (CMS/HCC)   • Chronic depression   • Anemia   • Abnormality of right ventricle of heart   • Non-rheumatic mitral regurgitation   • Class 2 obesity due to excess calories without serious comorbidity with body mass index (BMI) of 35.0 to 35.9 in adult   • Other convulsions   • Sleep walking   • Epilepsy (CMS/HCC)   • Panic attacks   • Mental disorder   • Psychosis (CMS/HCC)   • Schizophrenia (CMS/HCC)   • Pericardial effusion   • Pulmonary hypertension (CMS/HCC)   • Abnormal PFTs (pulmonary function tests)   • Familial hypercholesterolemia         Current Outpatient Medications:   •  amLODIPine (NORVASC) 10 MG tablet, Take 1 tablet by mouth Daily., Disp: 30 tablet, Rfl: 5  •  amoxicillin (AMOXIL) 500 MG capsule, Take 500 mg by mouth Daily., Disp: , Rfl:   •  busPIRone (BUSPAR) 10 MG  tablet, Take 10 mg by mouth 2 (Two) Times a Day., Disp: , Rfl:   •  carBAMazepine XR (TEGRETOL-XR) 200 MG 12 hr tablet, Take 200 mg by mouth 2 (two) times a day. Med Name: Tegretol  mg tablet,extended release; Indication: seizures, Disp: , Rfl:   •  citalopram (CELEXA) 20 MG tablet, Take 40 mg by mouth daily. Indication: depression, Disp: , Rfl:   •  cyclobenzaprine (FLEXERIL) 5 MG tablet, Take 1 tablet by mouth 3 (Three) Times a Day As Needed for Muscle Spasms., Disp: 90 tablet, Rfl: 3  •  levETIRAcetam (KEPPRA) 500 MG tablet, Take  by mouth Daily. 3 tabs AM, 3 tabs PM; Indication: seizures, Disp: , Rfl:   •  metoprolol tartrate (LOPRESSOR) 50 MG tablet, TAKE 1 TABLET BY MOUTH TWO TIMES DAILY, Disp: 60 tablet, Rfl: 5  •  Multiple Vitamins-Minerals (MULTIVITAMIN ADULT PO), Take 1 tablet by mouth Daily., Disp: , Rfl:   •  primidone (MYSOLINE) 50 MG tablet, Take 3 tablets by mouth Daily. 3 nightly, Disp: , Rfl:   •  risperiDONE (RISPERDAL) 4 MG tablet, TAKE 2 MG IN THE MORNING AND 6 MG IN THE EVENING, Disp: , Rfl:     Allergies   Allergen Reactions   • Abilify [Aripiprazole] Confusion and Hallucinations   • Lexapro [Escitalopram] Other (See Comments)     Other reaction(s): JERKING   • Perphenazine Other (See Comments)     Sleep walking   • Depakote [Valproic Acid] Other (See Comments)     Other reaction(s): Other (See Comments)  Other reaction(s): TREMORS  Other reaction(s): TREMORS   • Mirtazapine Other (See Comments)     Other reaction(s): Other (See Comments)  Other reaction(s): JERKING IN SLEEP  Other reaction(s): JERKING IN SLEEP   • Phenobarbital Confusion and Hallucinations     Hyper, Hallucination       Past Medical History:   Diagnosis Date   • Abnormal electrocardiogram     Abnormal electrocardiogram [ECG] [EKG]     • Abnormal result of cardiovascular function study     Abnormal result of other cardiovascular function study   • Acute bronchitis    • Allergic rhinitis    • Anemia    • Burn of mouth and  pharynx    • Chronic depression    • Chronic undifferentiated schizophrenia (CMS/HCC)    • Common cold     Common cold - improving      • Contact dermatitis    • Cough    • Encounter for surgical aftercare following surgery of circulatory system     Encounter for surgical aftercare following surgery on the circulatory system - VNS Implant 2/16/16   • Epidermoid cyst    • Epilepsy (CMS/HCC)    • Epistaxis    • Essential hypertension    • History of echocardiogram 12/31/2015    Echocardiogram W/ color flow 28693 (1) - Mildly dilated left ventricle with normal function with Ef of 55-60%.Mildly dilated right ventricle with normal function. right ventricular EF of 50%.Diastolic function normal.No significant valvular regurg or stenosis.   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, intractable, with status epilepticus (CMS/HCC)     Localization-related (focal) (partial) idiopathic epilepsy and epileptic syndromes with seizures of localized onset, intractable, without status epilepticus   • Multiple acquired skin tags     Multiple skin tags - Chronically irritated   • Need for immunization against influenza     Needs influenza immunization      • Panic disorder    • Renal function test abnormal     Renal function tests abnormal   • Schizophrenia (CMS/HCC)     Schizophrenia, unspecified   • Sebaceous cyst    • Sleep apnea    • Snoring    • Tinea cruris    • Upper respiratory infection      Past Surgical History:   Procedure Laterality Date   • EXCISION LESION  05/25/2015    Destruction of Benign Lesion (1-14) 23912 (2) - KRYSTAL OLVERA (General Surgery)    • INJECTION OF MEDICATION  06/20/2015    Kenalog (1) - SAFIA KNIGHT ([facility])    • OTHER SURGICAL HISTORY  02/16/2016    Implant neuroreceiver (1) - Implantation of VNS electrode array & impulse generator [employer] model 106 serial [id number] lead  "model 303 serial 14973 with excision of cervical lymph nodes.   • WOUND CLOSURE  05/10/2015    Layer Closure of Wound TR-EXT 2.6-7.5 CM 29294 (1) - KRYSTAL OLVERA (General Surgery)      Social History     Socioeconomic History   • Marital status: Single     Spouse name: Not on file   • Number of children: Not on file   • Years of education: Not on file   • Highest education level: Not on file   Tobacco Use   • Smoking status: Never Smoker   • Smokeless tobacco: Never Used   Vaping Use   • Vaping Use: Never used   Substance and Sexual Activity   • Alcohol use: No   • Drug use: No     Family History   Problem Relation Age of Onset   • Hyperlipidemia Mother    • Hypertension Mother    • Other Mother         Respiratory disorder   • Rheum arthritis Mother    • Stroke Mother    • Cancer Mother    • No Known Problems Father    • Breast cancer Other    • Diabetes Other    • Heart disease Other    • Stroke Maternal Grandmother    • Cancer Maternal Grandmother    • Seizures Neg Hx           Objective     Blood pressure 120/66, pulse 73, temperature 96.9 °F (36.1 °C), height 170.2 cm (67\"), weight 106 kg (233 lb), SpO2 98 %.  Physical Exam    PFTs:  (independently reviewed and interpreted by me)  9/22/20  Ratio 73%  FVC 4.05L, 93%  FEV1 2.99L, 84%         Assessment/Plan     Diagnoses and all orders for this visit:    1. Vocal cord dysfunction (Primary)         Discussion/ Recommendations:   Patients description of his symptoms are not really consistent with a pulmonary problem. Additionally, he had normal PFTs last year and is a nonsmoker. He has had his vagal nerve stimulator since 2016 and reports some voice changes from that. I suspect he may be getting vocal cord spasms, but whether it's related to his VNS, I'm not sure. I will refer him to ENT for further workup of this. May need speech path as well.             No follow-ups on file.      Thank you for allowing me to participate in the care of Nicholas Duane Stovall. Please do " not hesitate to contact me with any questions.         This document has been electronically signed by Cat Sanches DO on August 24, 2021 10:31 CDT

## 2021-09-24 ENCOUNTER — DOCUMENTATION (OUTPATIENT)
Dept: SLEEP MEDICINE | Facility: HOSPITAL | Age: 41
End: 2021-09-24

## 2021-09-24 NOTE — PROGRESS NOTES
I returned patient's phone call. Name and  verified. Discussed with patient new safety recall of [employer] CPAP/BiPAP/AVAPS machines. We discussed the risk versus benefit of continuing usage of PAP therapy. The company has recommended that patients stop usage of their current machines. However, patient has history of seizure disorder and must weigh risk verus benefit of using PAP machine. Not using CPAP may decrease seizure threshold. Instructed patient to call [employer] ([phone]) to check if the machine is under warranty for repair or replacement. He has already registered machine. He is not eligible for new machine through insurance. For now he is going to continue to use machine and wait for [employer] to send new machine to him.  Follow up with DME company or sleep clinic regarding any further questions. Advised patient to avoid unapproved ozone-type CPAP . Advised to call us if any further questions or problems. He verbalized understanding. All questions answered.

## 2021-09-29 DIAGNOSIS — I10 ESSENTIAL HYPERTENSION: ICD-10-CM

## 2021-09-29 RX ORDER — AMLODIPINE BESYLATE 10 MG/1
10 TABLET ORAL DAILY
Qty: 60 TABLET | Refills: 5 | Status: SHIPPED | OUTPATIENT
Start: 2021-09-29 | End: 2021-10-25 | Stop reason: SDUPTHER

## 2021-10-07 ENCOUNTER — OFFICE VISIT (OUTPATIENT)
Dept: OTOLARYNGOLOGY | Facility: CLINIC | Age: 41
End: 2021-10-07

## 2021-10-07 VITALS — WEIGHT: 232.8 LBS | HEIGHT: 67 IN | OXYGEN SATURATION: 99 % | BODY MASS INDEX: 36.54 KG/M2

## 2021-10-07 DIAGNOSIS — J38.5 LARYNGOSPASMS: ICD-10-CM

## 2021-10-07 DIAGNOSIS — G52.2 VAGAL NERVE SENSITIVITY: Primary | ICD-10-CM

## 2021-10-07 DIAGNOSIS — J34.89 NASAL SEPTAL PERFORATION: ICD-10-CM

## 2021-10-07 PROCEDURE — 99203 OFFICE O/P NEW LOW 30 MIN: CPT | Performed by: OTOLARYNGOLOGY

## 2021-10-07 PROCEDURE — 31575 DIAGNOSTIC LARYNGOSCOPY: CPT | Performed by: OTOLARYNGOLOGY

## 2021-10-07 NOTE — PROGRESS NOTES
"Subjective   Nicholas Duane Stovall is a 41 y.o. male.   Spasms  History of Present Illness   Patient states that on five occasions over the last 2 years lasting from 30 seconds to minute he is had problems with his throat closing off \"\" it went away it happened with a popsicle and a few times with other foods.  It was not related to any particular temperature of the food or consistenc .  Thess episodes almost always happens when he is eating.  He is not have any fever ,chills. He states the episodes make it difficult for him to speak and breathe but has not passed out and then it just went away.  He denies asthma, he is a non-smoker, is not have any trouble breathing normally, he does have sleep apnea, uses CPAP. He has had an injury injury to his nose as a child which caused scarring he does not describe trouble breathing through his nose whistling or bleeding. Not have any neck pain, dysphagia, and he is not losing weight, not having sore throat or hemoptysis. The patient denies any asthma or reactive airway disease or pulmonary problems or significant reflux occasionally takes antiacids but no consistent problems on a regular basis.  The patient denies any trauma to his neck or throat or any recent intubations  The vagal device is made a big difference in terms of cutting down the number of seizures and is denies any seizure for the last 3 years  He does not note any change in medications that seem to be related to this problem  The following portions of the patient's history were reviewed and updated as appropriate: allergies, current medications, past family history, past medical history, past social history, past surgical history and problem list.      Nicholas Duane Stovall reports that he has never smoked. He has never used smokeless tobacco. He reports that he does not drink alcohol and does not use drugs.  Patient is not a tobacco user and has not been counseled for use of tobacco products    Family " History   Problem Relation Age of Onset   • Hyperlipidemia Mother    • Hypertension Mother    • Other Mother         Respiratory disorder   • Rheum arthritis Mother    • Stroke Mother    • Cancer Mother    • No Known Problems Father    • Breast cancer Other    • Diabetes Other    • Heart disease Other    • Stroke Maternal Grandmother    • Cancer Maternal Grandmother    • Seizures Neg Hx          Current Outpatient Medications:   •  amLODIPine (NORVASC) 10 MG tablet, TAKE 1 TABLET BY MOUTH  DAILY, Disp: 60 tablet, Rfl: 5  •  amoxicillin (AMOXIL) 500 MG capsule, Take 500 mg by mouth Daily., Disp: , Rfl:   •  busPIRone (BUSPAR) 10 MG tablet, Take 10 mg by mouth 2 (Two) Times a Day., Disp: , Rfl:   •  carBAMazepine XR (TEGRETOL-XR) 200 MG 12 hr tablet, Take 200 mg by mouth 2 (two) times a day. Med Name: Tegretol  mg tablet,extended release; Indication: seizures, Disp: , Rfl:   •  citalopram (CELEXA) 20 MG tablet, Take 40 mg by mouth daily. Indication: depression, Disp: , Rfl:   •  cyclobenzaprine (FLEXERIL) 5 MG tablet, Take 1 tablet by mouth 3 (Three) Times a Day As Needed for Muscle Spasms., Disp: 90 tablet, Rfl: 3  •  levETIRAcetam (KEPPRA) 500 MG tablet, Take  by mouth Daily. 3 tabs AM, 3 tabs PM; Indication: seizures, Disp: , Rfl:   •  metoprolol tartrate (LOPRESSOR) 50 MG tablet, TAKE 1 TABLET BY MOUTH TWO TIMES DAILY, Disp: 60 tablet, Rfl: 5  •  Multiple Vitamins-Minerals (MULTIVITAMIN ADULT PO), Take 1 tablet by mouth Daily., Disp: , Rfl:   •  primidone (MYSOLINE) 50 MG tablet, Take 3 tablets by mouth Daily. 3 nightly, Disp: , Rfl:   •  risperiDONE (RISPERDAL) 4 MG tablet, TAKE 2 MG IN THE MORNING AND 6 MG IN THE EVENING, Disp: , Rfl:     Allergies   Allergen Reactions   • Abilify [Aripiprazole] Confusion and Hallucinations   • Lexapro [Escitalopram] Other (See Comments)     Other reaction(s): JERKING   • Perphenazine Other (See Comments)     Sleep walking   • Depakote [Valproic Acid] Other (See Comments)      Other reaction(s): Other (See Comments)  Other reaction(s): TREMORS  Other reaction(s): TREMORS   • Mirtazapine Other (See Comments)     Other reaction(s): Other (See Comments)  Other reaction(s): JERKING IN SLEEP  Other reaction(s): JERKING IN SLEEP   • Phenobarbital Confusion and Hallucinations     Hyper, Hallucination       Past Medical History:   Diagnosis Date   • Abnormal electrocardiogram     Abnormal electrocardiogram [ECG] [EKG]     • Abnormal result of cardiovascular function study     Abnormal result of other cardiovascular function study   • Acute bronchitis    • Allergic rhinitis    • Anemia    • Asthma 2020    windpipe closes sometimes when eating or in humid weather   • Burn of mouth and pharynx    • Chronic depression    • Chronic undifferentiated schizophrenia (HCC)    • Common cold     Common cold - improving      • Contact dermatitis    • Cough    • Dizziness 2020    sometimes when i stand quickly   • Encounter for surgical aftercare following surgery of circulatory system     Encounter for surgical aftercare following surgery on the circulatory system - VNS Implant 2/16/16   • Epidermoid cyst    • Epilepsy (HCC)    • Epistaxis    • Essential hypertension    • Headache ?    not often but occasionally   • History of echocardiogram 12/31/2015    Echocardiogram W/ color flow 69607 (1) - Mildly dilated left ventricle with normal function with Ef of 55-60%.Mildly dilated right ventricle with normal function. right ventricular EF of 50%.Diastolic function normal.No significant valvular regurg or stenosis.   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, intractable, with status epilepticus (HCC)     Localization-related (focal) (partial) idiopathic epilepsy and epileptic syndromes with seizures of localized onset, intractable, without status epilepticus   • Multiple  acquired skin tags     Multiple skin tags - Chronically irritated   • Need for immunization against influenza     Needs influenza immunization      • Panic disorder    • Renal function test abnormal     Renal function tests abnormal   • Schizophrenia (HCC)     Schizophrenia, unspecified   • Sebaceous cyst    • Sleep apnea    • Snoring    • Tinea cruris    • Upper respiratory infection        Past Surgical History:   Procedure Laterality Date   • EXCISION LESION  05/25/2015    Destruction of Benign Lesion (1-14) 63784 (2) - KRYSTAL OLVERA (General Surgery)    • INJECTION OF MEDICATION  06/20/2015    Kenalog (1) - SAFIA KNIGHT ([facility])    • OTHER SURGICAL HISTORY  02/16/2016    Implant neuroreceiver (1) - Implantation of VNS electrode array & impulse generator [employer] model 106 serial 36325 lead model 303 serial [id number] with excision of cervical lymph nodes.   • WOUND CLOSURE  05/10/2015    Layer Closure of Wound TR-EXT 2.6-7.5 CM 54434 (1) - KRYSTAL OLVERA (General Surgery)        Review of Systems   Constitutional: Negative for fever and unexpected weight change.   HENT: Negative for congestion, hearing loss, rhinorrhea, sore throat, trouble swallowing and voice change.    Eyes: Positive for discharge.   Respiratory: Positive for apnea.    Cardiovascular: Negative for chest pain.   Allergic/Immunologic: Negative for environmental allergies and food allergies.   Neurological: Positive for seizures. Negative for facial asymmetry and speech difficulty.   Hematological: Negative for adenopathy.   Psychiatric/Behavioral: Negative for agitation.           Objective   Physical Exam  Vitals and nursing note reviewed.   Constitutional:       Appearance: Normal appearance. He is obese.   HENT:      Right Ear: Tympanic membrane, ear canal and external ear normal.      Left Ear: Tympanic membrane, ear canal and external ear normal.      Nose:      Comments: Anterior septal perforation approximately 2 cm was severely deviated nose no  crusting or blood noted     Mouth/Throat:      Pharynx: Oropharynx is clear.   Eyes:      Conjunctiva/sclera: Conjunctivae normal.   Pulmonary:      Effort: Pulmonary effort is normal.   Musculoskeletal:         General: Normal range of motion.      Cervical back: No rigidity or tenderness.   Lymphadenopathy:      Cervical: No cervical adenopathy.   Neurological:      General: No focal deficit present.      Mental Status: He is alert and oriented to person, place, and time.   Psychiatric:         Mood and Affect: Mood normal.             Procedure Note    Pre-operative Diagnosis:   Chief Complaint   Patient presents with   • vocal cord dysfunction     Possible laryngospams  Post-operative Diagnosis: same    Anesthesia: topical with xylocaine and neosynephrine    Endoscopy Type:  Flexible Laryngoscopy    Procedure Details:    The patient was placed in the sitting position.  After topical anesthesia and decongestion, the 4 mm laryngoscope was passed.  The nasal cavities, nasopharynx, oropharynx, hypopharynx, and larynx were all examined.  Vocal cords were examined during respiration and phonation.  The following findings were noted:    Findings: Very deviated septum evidence of fracture and septal perforation  Possibly mild sagging of the vocal cord but no mass lesion tumor good closure and adduction of the vocal cords on vocalization-minimally abnormal to normal exam    Condition:  Stable.  Patient tolerated procedure well.    Complications:  None  Assessment/Plan   Diagnoses and all orders for this visit:    1. Vagal nerve sensitivity (Primary)  -     Ambulatory Referral to Cardiothoracic Surgery    2. Nasal septal perforation    3. Laryngospasms    Patient has a history of what sounds like very occasional laryngospasm's but there are no physical findings suggesting a cause.     This problem did not start immediately but has started subsequent to him having a vagal nerve stimulator which is known to have some effect  on the vocal cord mobility. The patient says he is had slight change in voice since his procedure but nothing persistent or affecting his ability to communicate.   I would suggest he speak to Dr. Martin who put the device and to see if there is any other suggestions he might know about that are related to this problem  Patient is to keep a diary of when this happens exactly what he is eating temperature the foods etc. to see if there is any particular stimulation that might result in his symptoms.  He is due to have his battery change in his device normal speak to the people who will be involved in that issue as well.   Other options would be to consider pulmonary evaluation.

## 2021-10-25 ENCOUNTER — OFFICE VISIT (OUTPATIENT)
Dept: FAMILY MEDICINE CLINIC | Facility: CLINIC | Age: 41
End: 2021-10-25

## 2021-10-25 VITALS
BODY MASS INDEX: 36.17 KG/M2 | SYSTOLIC BLOOD PRESSURE: 136 MMHG | HEIGHT: 67 IN | OXYGEN SATURATION: 100 % | HEART RATE: 77 BPM | DIASTOLIC BLOOD PRESSURE: 70 MMHG | WEIGHT: 230.44 LBS

## 2021-10-25 DIAGNOSIS — Z00.00 ENCOUNTER FOR ANNUAL WELLNESS EXAM IN MEDICARE PATIENT: Primary | ICD-10-CM

## 2021-10-25 DIAGNOSIS — Z23 NEED FOR INFLUENZA VACCINATION: ICD-10-CM

## 2021-10-25 DIAGNOSIS — E78.01 FAMILIAL HYPERCHOLESTEROLEMIA: ICD-10-CM

## 2021-10-25 DIAGNOSIS — I10 ESSENTIAL HYPERTENSION: ICD-10-CM

## 2021-10-25 PROCEDURE — G0008 ADMIN INFLUENZA VIRUS VAC: HCPCS | Performed by: FAMILY MEDICINE

## 2021-10-25 PROCEDURE — 90686 IIV4 VACC NO PRSV 0.5 ML IM: CPT | Performed by: FAMILY MEDICINE

## 2021-10-25 PROCEDURE — G0439 PPPS, SUBSEQ VISIT: HCPCS | Performed by: FAMILY MEDICINE

## 2021-10-25 PROCEDURE — 1159F MED LIST DOCD IN RCRD: CPT | Performed by: FAMILY MEDICINE

## 2021-10-25 PROCEDURE — 99214 OFFICE O/P EST MOD 30 MIN: CPT | Performed by: FAMILY MEDICINE

## 2021-10-25 RX ORDER — AMLODIPINE BESYLATE 10 MG/1
10 TABLET ORAL DAILY
Qty: 90 TABLET | Refills: 2 | Status: SHIPPED | OUTPATIENT
Start: 2021-10-25 | End: 2022-01-24 | Stop reason: SDUPTHER

## 2021-10-25 RX ORDER — METOPROLOL TARTRATE 50 MG/1
50 TABLET, FILM COATED ORAL 2 TIMES DAILY
Qty: 180 TABLET | Refills: 2 | Status: SHIPPED | OUTPATIENT
Start: 2021-10-25 | End: 2022-01-24 | Stop reason: SDUPTHER

## 2021-10-25 RX ORDER — CYCLOBENZAPRINE HCL 5 MG
5 TABLET ORAL 3 TIMES DAILY PRN
Qty: 90 TABLET | Refills: 3 | OUTPATIENT
Start: 2021-10-25 | End: 2022-05-16

## 2021-10-25 NOTE — PROGRESS NOTES
The ABCs of the Annual Wellness Visit  Subsequent Medicare Wellness Visit  Cc: follow up of chronic medical issues    Chief Complaint   Patient presents with   • Medicare Wellness-subsequent   • Follow-up   • Hypertension      Subjective    History of Present Illness:  Nicholas Duane Stovall is a 41 y.o. male who presents for a Subsequent Medicare Wellness Visit.  The patient comes in for a Medicare Wellness exam. His Hypertension and Hypercholesterolemia are reviewed.  The following portions of the patient's history were reviewed and   updated as appropriate: allergies, current medications, past family history, past medical history, past social history, past surgical history and problem list.    Compared to one year ago, the patient feels his physical   health is the same.    Compared to one year ago, the patient feels his mental   health is better.    Recent Hospitalizations:  He was not admitted to the hospital during the last year.       Current Medical Providers:  Patient Care Team:  Calos Greenberg MD as PCP - General  Jass Mendoza MD as Consulting Physician (Sleep Medicine)  Chance Narayanan MD as Consulting Physician (Dermatology)    Outpatient Medications Prior to Visit   Medication Sig Dispense Refill   • busPIRone (BUSPAR) 10 MG tablet Take 10 mg by mouth 2 (Two) Times a Day.     • carBAMazepine XR (TEGRETOL-XR) 200 MG 12 hr tablet Take 200 mg by mouth 2 (Two) Times a Day. Med Name: Tegretol  mg tablet,extended release; Indication: seizures      • citalopram (CELEXA) 20 MG tablet Take 40 mg by mouth Daily. Indication: depression      • levETIRAcetam (KEPPRA) 500 MG tablet Take  by mouth Daily. 3 tabs AM, 3 tabs PM; Indication: seizures     • Multiple Vitamins-Minerals (MULTIVITAMIN ADULT PO) Take 1 tablet by mouth Daily.     • primidone (MYSOLINE) 50 MG tablet Take 3 tablets by mouth Daily. 3 nightly     • risperiDONE (RISPERDAL) 4 MG tablet TAKE 2 MG IN THE MORNING AND 6 MG IN THE EVENING      • amLODIPine (NORVASC) 10 MG tablet TAKE 1 TABLET BY MOUTH  DAILY 60 tablet 5   • cyclobenzaprine (FLEXERIL) 5 MG tablet Take 1 tablet by mouth 3 (Three) Times a Day As Needed for Muscle Spasms. 90 tablet 3   • metoprolol tartrate (LOPRESSOR) 50 MG tablet TAKE 1 TABLET BY MOUTH TWO TIMES DAILY 60 tablet 5   • amoxicillin (AMOXIL) 500 MG capsule Take 500 mg by mouth Daily.       No facility-administered medications prior to visit.       No opioid medication identified on active medication list. I have reviewed chart for other potential  high risk medication/s and harmful drug interactions in the elderly.          Aspirin is not on active medication list.  Aspirin use is not indicated based on review of current medical condition/s. Risk of harm outweighs potential benefits.  .    Patient Active Problem List   Diagnosis   • Schizophrenia (HCC)   • Sleep apnea   • Panic disorder   • Need for immunization against influenza   • Essential hypertension   • Epilepsy (HCC)   • Chronic depression   • Anemia   • Abnormality of right ventricle of heart   • Non-rheumatic mitral regurgitation   • Class 2 obesity due to excess calories without serious comorbidity with body mass index (BMI) of 35.0 to 35.9 in adult   • Other convulsions   • Sleep walking   • Epilepsy (HCC)   • Panic attacks   • Mental disorder   • Psychosis (HCC)   • Schizophrenia (HCC)   • Pericardial effusion   • Pulmonary hypertension (HCC)   • Abnormal PFTs (pulmonary function tests)   • Familial hypercholesterolemia     Advance Care Planning  Advance Directive is not on file.  ACP discussion was held with the patient during this visit. Patient does not have an advance directive, information provided.    Review of Systems   Constitutional: Negative for fatigue and fever.   Respiratory: Negative for cough, chest tightness and shortness of breath.    Cardiovascular: Negative for chest pain, palpitations and leg swelling.   Gastrointestinal: Negative for  "abdominal pain, anal bleeding, constipation and diarrhea.   Musculoskeletal: Positive for back pain. Negative for arthralgias and myalgias.        Objective    Vitals:    10/25/21 1055   BP: 136/70   Pulse: 77   SpO2: 100%   Weight: 105 kg (230 lb 7 oz)   Height: 170.2 cm (67\")     BMI Readings from Last 1 Encounters:   10/25/21 36.09 kg/m²   BMI is above normal parameters. Recommendations include: educational material    Does the patient have evidence of cognitive impairment? No    Physical Exam  Vitals reviewed.   Constitutional:       Appearance: Normal appearance.   HENT:      Head: Normocephalic and atraumatic.      Right Ear: Tympanic membrane, ear canal and external ear normal.      Left Ear: Tympanic membrane, ear canal and external ear normal.      Nose: Nose normal.      Mouth/Throat:      Mouth: Mucous membranes are moist.      Pharynx: Oropharynx is clear.   Cardiovascular:      Rate and Rhythm: Normal rate and regular rhythm.      Heart sounds: Normal heart sounds. No murmur heard.  No friction rub. No gallop.    Pulmonary:      Effort: Pulmonary effort is normal. No respiratory distress.      Breath sounds: Normal breath sounds. No stridor. No wheezing, rhonchi or rales.   Chest:      Chest wall: No tenderness.   Abdominal:      General: Abdomen is flat. Bowel sounds are normal. There is no distension.      Palpations: Abdomen is soft. There is no mass.      Tenderness: There is no abdominal tenderness. There is no guarding or rebound.      Hernia: No hernia is present.   Musculoskeletal:      Cervical back: Normal range of motion.      Comments: Low back is tender on palpation.   Skin:     General: Skin is warm and dry.   Neurological:      Mental Status: He is alert.                 HEALTH RISK ASSESSMENT    Smoking Status:  Social History     Tobacco Use   Smoking Status Never Smoker   Smokeless Tobacco Never Used     Alcohol Consumption:  Social History     Substance and Sexual Activity   Alcohol " Use No     Fall Risk Screen:    MARIAELENA Fall Risk Assessment has not been completed.    Depression Screening:  PHQ-2/PHQ-9 Depression Screening 10/25/2021   Little interest or pleasure in doing things 0   Feeling down, depressed, or hopeless 0   Trouble falling or staying asleep, or sleeping too much -   Feeling tired or having little energy -   Poor appetite or overeating -   Feeling bad about yourself - or that you are a failure or have let yourself or your family down -   Trouble concentrating on things, such as reading the newspaper or watching television -   Moving or speaking so slowly that other people could have noticed. Or the opposite - being so fidgety or restless that you have been moving around a lot more than usual -   Thoughts that you would be better off dead, or of hurting yourself in some way -   Total Score 0   If you checked off any problems, how difficult have these problems made it for you to do your work, take care of things at home, or get along with other people? -       Health Habits and Functional and Cognitive Screening:  Functional & Cognitive Status 10/25/2021   Do you have difficulty preparing food and eating? No   Do you have difficulty bathing yourself, getting dressed or grooming yourself? No   Do you have difficulty using the toilet? No   Do you have difficulty moving around from place to place? No   Do you have trouble with steps or getting out of a bed or a chair? No   Current Diet Well Balanced Diet   Dental Exam Up to date   Eye Exam Not up to date   Exercise (times per week) 2 times per week   Current Exercises Include Yard Work;Walking   Current Exercise Activities Include -   Do you need help using the phone?  No   Are you deaf or do you have serious difficulty hearing?  No   Do you need help with transportation? No   Do you need help shopping? No   Do you need help preparing meals?  No   Do you need help with housework?  No   Do you need help with laundry? No   Do you need  help taking your medications? Yes   Do you need help managing money? Yes   Do you ever drive or ride in a car without wearing a seat belt? No   Have you felt unusual stress, anger or loneliness in the last month? No   Who do you live with? Alone   If you need help, do you have trouble finding someone available to you? No   Have you been bothered in the last four weeks by sexual problems? No   Do you have difficulty concentrating, remembering or making decisions? No       Age-appropriate Screening Schedule:  Refer to the list below for future screening recommendations based on patient's age, sex and/or medical conditions. Orders for these recommended tests are listed in the plan section. The patient has been provided with a written plan.    Health Maintenance   Topic Date Due   • LIPID PANEL  07/27/2021   • INFLUENZA VACCINE  08/01/2021   • TDAP/TD VACCINES (2 - Td or Tdap) 07/10/2027              Assessment/Plan   CMS Preventative Services Quick Reference  Risk Factors Identified During Encounter  Dementia/Memory   Depression/Dysphoria  Fall Risk-High or Moderate  Hearing Problem  The above risks/problems have been discussed with the patient.  Follow up actions/plans if indicated are seen below in the Assessment/Plan Section.  Pertinent information has been shared with the patient in the After Visit Summary.    Diagnoses and all orders for this visit:    1. Encounter for annual wellness exam in Medicare patient (Primary)    2. Essential hypertension  -     Comprehensive metabolic panel; Future  -     CBC w AUTO Differential; Future  -     Lipid Panel; Future  -     metoprolol tartrate (LOPRESSOR) 50 MG tablet; Take 1 tablet by mouth 2 (Two) Times a Day.  Dispense: 180 tablet; Refill: 2  -     amLODIPine (NORVASC) 10 MG tablet; Take 1 tablet by mouth Daily.  Dispense: 90 tablet; Refill: 2    3. Familial hypercholesterolemia  -     Comprehensive metabolic panel; Future  -     CBC w AUTO Differential; Future  -      Lipid Panel; Future    4. Need for influenza vaccination  -     FluLaval/Fluarix/Fluzone >6 Months ([id number])    Other orders  -     cyclobenzaprine (FLEXERIL) 5 MG tablet; Take 1 tablet by mouth 3 (Three) Times a Day As Needed for Muscle Spasms.  Dispense: 90 tablet; Refill: 3        Follow Up:   No follow-ups on file.     An After Visit Summary and PPPS were made available to the patient.    Return to the clinic in 3 month/s.  Will contact with results as needed.  I reviewed the CBC,CMP,and Carbamazepine and Keppra Levels with the patient..

## 2021-10-26 ENCOUNTER — LAB (OUTPATIENT)
Dept: LAB | Facility: HOSPITAL | Age: 41
End: 2021-10-26

## 2021-10-26 DIAGNOSIS — I10 ESSENTIAL HYPERTENSION: ICD-10-CM

## 2021-10-26 DIAGNOSIS — E78.01 FAMILIAL HYPERCHOLESTEROLEMIA: ICD-10-CM

## 2021-10-26 PROCEDURE — 85025 COMPLETE CBC W/AUTO DIFF WBC: CPT

## 2021-10-26 PROCEDURE — 80061 LIPID PANEL: CPT

## 2021-10-26 PROCEDURE — 80053 COMPREHEN METABOLIC PANEL: CPT

## 2021-10-26 PROCEDURE — 36415 COLL VENOUS BLD VENIPUNCTURE: CPT

## 2021-10-27 LAB
ALBUMIN SERPL-MCNC: 4.9 G/DL (ref 3.5–5.2)
ALBUMIN/GLOB SERPL: 2.9 G/DL
ALP SERPL-CCNC: 106 U/L (ref 39–117)
ALT SERPL W P-5'-P-CCNC: 44 U/L (ref 1–41)
ANION GAP SERPL CALCULATED.3IONS-SCNC: 7.3 MMOL/L (ref 5–15)
AST SERPL-CCNC: 37 U/L (ref 1–40)
BASOPHILS # BLD AUTO: 0.07 10*3/MM3 (ref 0–0.2)
BASOPHILS NFR BLD AUTO: 1.6 % (ref 0–1.5)
BILIRUB SERPL-MCNC: 0.4 MG/DL (ref 0–1.2)
BUN SERPL-MCNC: 7 MG/DL (ref 6–20)
BUN/CREAT SERPL: 8.3 (ref 7–25)
CALCIUM SPEC-SCNC: 9.3 MG/DL (ref 8.6–10.5)
CHLORIDE SERPL-SCNC: 100 MMOL/L (ref 98–107)
CHOLEST SERPL-MCNC: 164 MG/DL (ref 0–200)
CO2 SERPL-SCNC: 31.7 MMOL/L (ref 22–29)
CREAT SERPL-MCNC: 0.84 MG/DL (ref 0.76–1.27)
DEPRECATED RDW RBC AUTO: 38.8 FL (ref 37–54)
EOSINOPHIL # BLD AUTO: 0.01 10*3/MM3 (ref 0–0.4)
EOSINOPHIL NFR BLD AUTO: 0.2 % (ref 0.3–6.2)
ERYTHROCYTE [DISTWIDTH] IN BLOOD BY AUTOMATED COUNT: 13.6 % (ref 12.3–15.4)
GFR SERPL CREATININE-BSD FRML MDRD: 100 ML/MIN/1.73
GLOBULIN UR ELPH-MCNC: 1.7 GM/DL
GLUCOSE SERPL-MCNC: 97 MG/DL (ref 65–99)
HCT VFR BLD AUTO: 37.7 % (ref 37.5–51)
HDLC SERPL-MCNC: 34 MG/DL (ref 40–60)
HGB BLD-MCNC: 13.2 G/DL (ref 13–17.7)
IMM GRANULOCYTES # BLD AUTO: 0.02 10*3/MM3 (ref 0–0.05)
IMM GRANULOCYTES NFR BLD AUTO: 0.5 % (ref 0–0.5)
LDLC SERPL CALC-MCNC: 112 MG/DL (ref 0–100)
LDLC/HDLC SERPL: 3.25 {RATIO}
LYMPHOCYTES # BLD AUTO: 1.65 10*3/MM3 (ref 0.7–3.1)
LYMPHOCYTES NFR BLD AUTO: 37.8 % (ref 19.6–45.3)
MCH RBC QN AUTO: 28.3 PG (ref 26.6–33)
MCHC RBC AUTO-ENTMCNC: 35 G/DL (ref 31.5–35.7)
MCV RBC AUTO: 80.7 FL (ref 79–97)
MONOCYTES # BLD AUTO: 0.46 10*3/MM3 (ref 0.1–0.9)
MONOCYTES NFR BLD AUTO: 10.5 % (ref 5–12)
NEUTROPHILS NFR BLD AUTO: 2.16 10*3/MM3 (ref 1.7–7)
NEUTROPHILS NFR BLD AUTO: 49.4 % (ref 42.7–76)
NRBC BLD AUTO-RTO: 0 /100 WBC (ref 0–0.2)
PLATELET # BLD AUTO: 159 10*3/MM3 (ref 140–450)
PMV BLD AUTO: 8.9 FL (ref 6–12)
POTASSIUM SERPL-SCNC: 4.4 MMOL/L (ref 3.5–5.2)
PROT SERPL-MCNC: 6.6 G/DL (ref 6–8.5)
RBC # BLD AUTO: 4.67 10*6/MM3 (ref 4.14–5.8)
SODIUM SERPL-SCNC: 139 MMOL/L (ref 136–145)
TRIGL SERPL-MCNC: 97 MG/DL (ref 0–150)
VLDLC SERPL-MCNC: 18 MG/DL (ref 5–40)
WBC # BLD AUTO: 4.37 10*3/MM3 (ref 3.4–10.8)

## 2022-01-24 ENCOUNTER — OFFICE VISIT (OUTPATIENT)
Dept: FAMILY MEDICINE CLINIC | Facility: CLINIC | Age: 42
End: 2022-01-24

## 2022-01-24 ENCOUNTER — LAB (OUTPATIENT)
Dept: LAB | Facility: HOSPITAL | Age: 42
End: 2022-01-24

## 2022-01-24 VITALS
HEIGHT: 67 IN | WEIGHT: 235.25 LBS | DIASTOLIC BLOOD PRESSURE: 76 MMHG | BODY MASS INDEX: 36.92 KG/M2 | HEART RATE: 74 BPM | OXYGEN SATURATION: 100 % | SYSTOLIC BLOOD PRESSURE: 128 MMHG

## 2022-01-24 DIAGNOSIS — E78.01 FAMILIAL HYPERCHOLESTEROLEMIA: ICD-10-CM

## 2022-01-24 DIAGNOSIS — I10 ESSENTIAL HYPERTENSION: Primary | ICD-10-CM

## 2022-01-24 PROCEDURE — 80061 LIPID PANEL: CPT

## 2022-01-24 PROCEDURE — 99214 OFFICE O/P EST MOD 30 MIN: CPT | Performed by: FAMILY MEDICINE

## 2022-01-24 PROCEDURE — 36415 COLL VENOUS BLD VENIPUNCTURE: CPT

## 2022-01-24 RX ORDER — CYCLOBENZAPRINE HCL 5 MG
5 TABLET ORAL 3 TIMES DAILY PRN
Qty: 90 TABLET | Refills: 3 | Status: CANCELLED | OUTPATIENT
Start: 2022-01-24

## 2022-01-24 RX ORDER — METOPROLOL TARTRATE 50 MG/1
50 TABLET, FILM COATED ORAL 2 TIMES DAILY
Qty: 180 TABLET | Refills: 2 | Status: SHIPPED | OUTPATIENT
Start: 2022-01-24 | End: 2022-07-25 | Stop reason: SDUPTHER

## 2022-01-24 RX ORDER — AMLODIPINE BESYLATE 10 MG/1
10 TABLET ORAL DAILY
Qty: 90 TABLET | Refills: 2 | Status: SHIPPED | OUTPATIENT
Start: 2022-01-24 | End: 2022-07-25 | Stop reason: SDUPTHER

## 2022-01-24 RX ORDER — AMOXICILLIN 500 MG/1
CAPSULE ORAL
COMMUNITY
Start: 2021-11-30 | End: 2022-06-29

## 2022-01-24 NOTE — PROGRESS NOTES
Subjective   Nicholas Duane Stovall is a 42 y.o. male.   .Cco    Hypertension  This is a chronic problem. The current episode started yesterday. The problem has been gradually improving since onset. Pertinent negatives include no anxiety, blurred vision, chest pain, headaches, malaise/fatigue, neck pain, orthopnea, palpitations, peripheral edema, PND, shortness of breath or sweats. Current antihypertension treatment includes beta blockers and calcium channel blockers.   Hyperlipidemia  This is a chronic problem. The current episode started more than 1 year ago. The problem is resistant. Pertinent negatives include no chest pain or shortness of breath. Current antihyperlipidemic treatment includes diet change.       The following portions of the patient's history were reviewed and updated as appropriate: allergies, current medications, past family history, past medical history, past social history, past surgical history and problem list.    Review of Systems   Constitutional: Negative for malaise/fatigue.   Eyes: Negative for blurred vision.   Respiratory: Negative for shortness of breath.    Cardiovascular: Negative for chest pain, palpitations, orthopnea and PND.   Musculoskeletal: Negative for neck pain.   Neurological: Negative for headaches.       Objective   Physical Exam  Vitals reviewed.   Constitutional:       Appearance: Normal appearance.   HENT:      Head: Normocephalic and atraumatic.      Right Ear: Tympanic membrane, ear canal and external ear normal.      Left Ear: Tympanic membrane, ear canal and external ear normal.      Nose: Nose normal.      Mouth/Throat:      Mouth: Mucous membranes are moist.      Pharynx: Oropharynx is clear.   Cardiovascular:      Rate and Rhythm: Normal rate and regular rhythm.      Heart sounds: Normal heart sounds. No murmur heard.  No friction rub. No gallop.    Pulmonary:      Effort: Pulmonary effort is normal. No respiratory distress.      Breath sounds: Normal breath  "sounds. No stridor. No wheezing, rhonchi or rales.   Chest:      Chest wall: No tenderness.   Abdominal:      General: Abdomen is flat. Bowel sounds are normal. There is no distension.      Palpations: Abdomen is soft. There is no mass.      Tenderness: There is no abdominal tenderness. There is no guarding or rebound.      Hernia: No hernia is present.   Musculoskeletal:      Cervical back: Normal range of motion.      Comments: The low back is non tender.   Skin:     General: Skin is warm and dry.   Neurological:      Mental Status: He is alert.           Visit Vitals  /76   Pulse 74   Ht 170.2 cm (67\")   Wt 107 kg (235 lb 4 oz)   SpO2 100%   BMI 36.85 kg/m²     Body mass index is 36.85 kg/m².      Assessment/Plan   Diagnoses and all orders for this visit:    1. Essential hypertension (Primary)  -     amLODIPine (NORVASC) 10 MG tablet; Take 1 tablet by mouth Daily.  Dispense: 90 tablet; Refill: 2  -     metoprolol tartrate (LOPRESSOR) 50 MG tablet; Take 1 tablet by mouth 2 (Two) Times a Day.  Dispense: 180 tablet; Refill: 2    2. Familial hypercholesterolemia  -     Lipid panel; Future    I reviewed the CMP and CBC with the patient.  Return to the clinic in 3 month/s.  Will contact with results as needed.  He will be getting his Booster for COVID.  Answers for HPI/ROS submitted by the patient on 1/18/2022  What is the primary reason for your visit?: Other  Please describe your symptoms.: checkup  Have you had these symptoms before?: Yes  How long have you been having these symptoms?: 1-4 days      "

## 2022-01-25 ENCOUNTER — OFFICE VISIT (OUTPATIENT)
Dept: SLEEP MEDICINE | Facility: HOSPITAL | Age: 42
End: 2022-01-25

## 2022-01-25 VITALS
OXYGEN SATURATION: 95 % | SYSTOLIC BLOOD PRESSURE: 125 MMHG | HEIGHT: 67 IN | WEIGHT: 235 LBS | BODY MASS INDEX: 36.88 KG/M2 | DIASTOLIC BLOOD PRESSURE: 89 MMHG | HEART RATE: 76 BPM

## 2022-01-25 DIAGNOSIS — G47.33 OBSTRUCTIVE SLEEP APNEA, ADULT: Primary | ICD-10-CM

## 2022-01-25 DIAGNOSIS — E66.01 MORBID OBESITY: ICD-10-CM

## 2022-01-25 LAB
CHOLEST SERPL-MCNC: 138 MG/DL (ref 0–200)
HDLC SERPL-MCNC: 33 MG/DL (ref 40–60)
LDLC SERPL CALC-MCNC: 81 MG/DL (ref 0–100)
LDLC/HDLC SERPL: 2.36 {RATIO}
TRIGL SERPL-MCNC: 136 MG/DL (ref 0–150)
VLDLC SERPL-MCNC: 24 MG/DL (ref 5–40)

## 2022-01-25 PROCEDURE — 99213 OFFICE O/P EST LOW 20 MIN: CPT | Performed by: NURSE PRACTITIONER
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
## 2022-01-26 ENCOUNTER — IMMUNIZATION (OUTPATIENT)
Dept: FAMILY MEDICINE CLINIC | Facility: CLINIC | Age: 42
End: 2022-01-26

## 2022-01-26 PROCEDURE — 0004A COVID-19 (PFIZER): CPT | Performed by: SURGERY

## 2022-01-26 PROCEDURE — 91300 COVID-19 (PFIZER): CPT | Performed by: SURGERY

## 2022-02-16 ENCOUNTER — TRANSCRIBE ORDERS (OUTPATIENT)
Dept: CARDIOLOGY | Facility: CLINIC | Age: 42
End: 2022-02-16

## 2022-02-16 ENCOUNTER — CLINICAL SUPPORT (OUTPATIENT)
Dept: CARDIOLOGY | Facility: CLINIC | Age: 42
End: 2022-02-16

## 2022-02-16 ENCOUNTER — TRANSCRIBE ORDERS (OUTPATIENT)
Dept: LAB | Facility: HOSPITAL | Age: 42
End: 2022-02-16

## 2022-02-16 ENCOUNTER — LAB (OUTPATIENT)
Dept: LAB | Facility: HOSPITAL | Age: 42
End: 2022-02-16

## 2022-02-16 DIAGNOSIS — T85.190D: ICD-10-CM

## 2022-02-16 DIAGNOSIS — Z79.899 LONG-TERM USE OF HIGH-RISK MEDICATION: Primary | ICD-10-CM

## 2022-02-16 DIAGNOSIS — Z79.899 LONG-TERM USE OF HIGH-RISK MEDICATION: ICD-10-CM

## 2022-02-16 DIAGNOSIS — T85.190D: Primary | ICD-10-CM

## 2022-02-16 LAB
ANION GAP SERPL CALCULATED.3IONS-SCNC: 11 MMOL/L (ref 5–15)
BASOPHILS # BLD AUTO: 0.06 10*3/MM3 (ref 0–0.2)
BASOPHILS NFR BLD AUTO: 1.2 % (ref 0–1.5)
BUN SERPL-MCNC: 7 MG/DL (ref 6–20)
BUN/CREAT SERPL: 7.5 (ref 7–25)
CALCIUM SPEC-SCNC: 8.8 MG/DL (ref 8.6–10.5)
CHLORIDE SERPL-SCNC: 97 MMOL/L (ref 98–107)
CO2 SERPL-SCNC: 27 MMOL/L (ref 22–29)
CREAT SERPL-MCNC: 0.93 MG/DL (ref 0.76–1.27)
DEPRECATED RDW RBC AUTO: 34.3 FL (ref 37–54)
EOSINOPHIL # BLD AUTO: 0.03 10*3/MM3 (ref 0–0.4)
EOSINOPHIL NFR BLD AUTO: 0.6 % (ref 0.3–6.2)
ERYTHROCYTE [DISTWIDTH] IN BLOOD BY AUTOMATED COUNT: 12.8 % (ref 12.3–15.4)
GFR SERPL CREATININE-BSD FRML MDRD: 88 ML/MIN/1.73
GLUCOSE SERPL-MCNC: 101 MG/DL (ref 65–99)
HCT VFR BLD AUTO: 35.4 % (ref 37.5–51)
HGB BLD-MCNC: 12.8 G/DL (ref 13–17.7)
IMM GRANULOCYTES # BLD AUTO: 0.03 10*3/MM3 (ref 0–0.05)
IMM GRANULOCYTES NFR BLD AUTO: 0.6 % (ref 0–0.5)
LYMPHOCYTES # BLD AUTO: 1.62 10*3/MM3 (ref 0.7–3.1)
LYMPHOCYTES NFR BLD AUTO: 33.4 % (ref 19.6–45.3)
MCH RBC QN AUTO: 27.8 PG (ref 26.6–33)
MCHC RBC AUTO-ENTMCNC: 36.2 G/DL (ref 31.5–35.7)
MCV RBC AUTO: 76.8 FL (ref 79–97)
MONOCYTES # BLD AUTO: 0.51 10*3/MM3 (ref 0.1–0.9)
MONOCYTES NFR BLD AUTO: 10.5 % (ref 5–12)
NEUTROPHILS NFR BLD AUTO: 2.6 10*3/MM3 (ref 1.7–7)
NEUTROPHILS NFR BLD AUTO: 53.7 % (ref 42.7–76)
NRBC BLD AUTO-RTO: 0 /100 WBC (ref 0–0.2)
PLATELET # BLD AUTO: 135 10*3/MM3 (ref 140–450)
PMV BLD AUTO: 8 FL (ref 6–12)
POTASSIUM SERPL-SCNC: 3.9 MMOL/L (ref 3.5–5.2)
RBC # BLD AUTO: 4.61 10*6/MM3 (ref 4.14–5.8)
SODIUM SERPL-SCNC: 135 MMOL/L (ref 136–145)
WBC NRBC COR # BLD: 4.85 10*3/MM3 (ref 3.4–10.8)

## 2022-02-16 PROCEDURE — 85025 COMPLETE CBC W/AUTO DIFF WBC: CPT

## 2022-02-16 PROCEDURE — 93000 ELECTROCARDIOGRAM COMPLETE: CPT | Performed by: INTERNAL MEDICINE

## 2022-02-16 PROCEDURE — 80048 BASIC METABOLIC PNL TOTAL CA: CPT

## 2022-02-16 PROCEDURE — 36415 COLL VENOUS BLD VENIPUNCTURE: CPT

## 2022-02-17 LAB
QT INTERVAL: 406 MS
QTC INTERVAL: 453 MS

## 2022-02-26 PROCEDURE — 87635 SARS-COV-2 COVID-19 AMP PRB: CPT | Performed by: NURSE PRACTITIONER

## 2022-04-25 ENCOUNTER — OFFICE VISIT (OUTPATIENT)
Dept: FAMILY MEDICINE CLINIC | Facility: CLINIC | Age: 42
End: 2022-04-25

## 2022-04-25 VITALS
DIASTOLIC BLOOD PRESSURE: 78 MMHG | WEIGHT: 227 LBS | OXYGEN SATURATION: 97 % | SYSTOLIC BLOOD PRESSURE: 134 MMHG | HEART RATE: 75 BPM | HEIGHT: 67 IN | BODY MASS INDEX: 35.63 KG/M2

## 2022-04-25 DIAGNOSIS — I10 ESSENTIAL HYPERTENSION: Primary | ICD-10-CM

## 2022-04-25 DIAGNOSIS — G40.909 NONINTRACTABLE EPILEPSY WITHOUT STATUS EPILEPTICUS, UNSPECIFIED EPILEPSY TYPE: ICD-10-CM

## 2022-04-25 PROCEDURE — 99214 OFFICE O/P EST MOD 30 MIN: CPT | Performed by: FAMILY MEDICINE

## 2022-04-25 NOTE — PROGRESS NOTES
Subjective   Nicholas Duane Stovall is a 42 y.o. male.   Cc: follow up of chronic medical issues    Hypertension  This is a chronic problem. The current episode started more than 1 year ago. The problem has been gradually improving since onset. Pertinent negatives include no anxiety, blurred vision, chest pain, headaches, malaise/fatigue, neck pain, orthopnea, palpitations, peripheral edema, PND, shortness of breath or sweats. Current antihypertension treatment includes calcium channel blockers and beta blockers.   Seizures   This is a chronic problem. Pertinent negatives include no headaches and no chest pain. Characteristics include loss of consciousness. Characteristics do not include eye blinking, eye deviation, bowel incontinence, bladder incontinence, rhythmic jerking, bit tongue or cyanosis.   He hasn't had a seizure since 2018.    The following portions of the patient's history were reviewed and updated as appropriate: allergies, current medications, past family history, past medical history, past social history, past surgical history and problem list.    Review of Systems   Constitutional: Negative for malaise/fatigue.   Eyes: Negative for blurred vision.   Respiratory: Negative for shortness of breath.    Cardiovascular: Negative for chest pain, palpitations, orthopnea, PND and cyanosis.   Gastrointestinal: Negative for bowel incontinence.   Genitourinary: Negative for bladder incontinence.   Musculoskeletal: Negative for neck pain.   Neurological: Positive for seizures and loss of consciousness. Negative for headaches.       Objective   Physical Exam  Vitals reviewed.   Constitutional:       Appearance: Normal appearance.   HENT:      Head: Normocephalic and atraumatic.      Right Ear: Tympanic membrane, ear canal and external ear normal.      Left Ear: Tympanic membrane, ear canal and external ear normal.      Nose: Nose normal.      Mouth/Throat:      Mouth: Mucous membranes are moist.      Pharynx:  "Oropharynx is clear.   Cardiovascular:      Rate and Rhythm: Normal rate and regular rhythm.      Heart sounds: Normal heart sounds. No murmur heard.    No friction rub. No gallop.   Pulmonary:      Effort: Pulmonary effort is normal. No respiratory distress.      Breath sounds: Normal breath sounds. No stridor. No wheezing, rhonchi or rales.   Chest:      Chest wall: No tenderness.   Abdominal:      General: Abdomen is flat. Bowel sounds are normal. There is no distension.      Palpations: Abdomen is soft. There is no mass.      Tenderness: There is no abdominal tenderness. There is no guarding or rebound.      Hernia: No hernia is present.   Musculoskeletal:      Cervical back: Normal range of motion.   Skin:     General: Skin is warm and dry.   Neurological:      General: No focal deficit present.      Mental Status: He is alert and oriented to person, place, and time.      Cranial Nerves: No cranial nerve deficit.      Comments: Romberg is negative. Station and Gait is  Within normal limits.           Visit Vitals  /78   Pulse 75   Ht 170.2 cm (67\")   Wt 103 kg (227 lb)   SpO2 97%   BMI 35.55 kg/m²     Body mass index is 35.55 kg/m².      Assessment/Plan   Diagnoses and all orders for this visit:    1. Essential hypertension (Primary)  -     Comprehensive metabolic panel; Future  -     CBC w MANUAL Differential; Future  -     Lipid panel; Future    2. Nonintractable epilepsy without status epilepticus, unspecified epilepsy type (HCC)    I reviewed the patient's BMP and CBC with the patient.  Return to the clinic in 3 month/s.  Will contact with results as needed.    Answers for HPI/ROS submitted by the patient on 4/20/2022  What is the primary reason for your visit?: Other  Please describe your symptoms.: No symptoms. Do have paperwork for license to fill out before 90 days  Have you had these symptoms before?: No  How long have you been having these symptoms?: 1-4 days      "

## 2022-04-26 ENCOUNTER — LAB (OUTPATIENT)
Dept: LAB | Facility: HOSPITAL | Age: 42
End: 2022-04-26

## 2022-04-26 DIAGNOSIS — I10 ESSENTIAL HYPERTENSION: ICD-10-CM

## 2022-04-26 PROCEDURE — 80061 LIPID PANEL: CPT

## 2022-04-26 PROCEDURE — 36415 COLL VENOUS BLD VENIPUNCTURE: CPT

## 2022-04-26 PROCEDURE — 85027 COMPLETE CBC AUTOMATED: CPT

## 2022-04-26 PROCEDURE — 85007 BL SMEAR W/DIFF WBC COUNT: CPT

## 2022-04-26 PROCEDURE — 80053 COMPREHEN METABOLIC PANEL: CPT

## 2022-04-27 LAB
ALBUMIN SERPL-MCNC: 4.8 G/DL (ref 3.5–5.2)
ALBUMIN/GLOB SERPL: 3 G/DL
ALP SERPL-CCNC: 98 U/L (ref 39–117)
ALT SERPL W P-5'-P-CCNC: 42 U/L (ref 1–41)
ANION GAP SERPL CALCULATED.3IONS-SCNC: 11 MMOL/L (ref 5–15)
ANISOCYTOSIS BLD QL: ABNORMAL
AST SERPL-CCNC: 32 U/L (ref 1–40)
BASOPHILS # BLD MANUAL: 0.08 10*3/MM3 (ref 0–0.2)
BASOPHILS NFR BLD MANUAL: 2 % (ref 0–1.5)
BILIRUB SERPL-MCNC: 0.3 MG/DL (ref 0–1.2)
BUN SERPL-MCNC: 11 MG/DL (ref 6–20)
BUN/CREAT SERPL: 14.9 (ref 7–25)
CALCIUM SPEC-SCNC: 8.9 MG/DL (ref 8.6–10.5)
CHLORIDE SERPL-SCNC: 104 MMOL/L (ref 98–107)
CHOLEST SERPL-MCNC: 134 MG/DL (ref 0–200)
CO2 SERPL-SCNC: 25 MMOL/L (ref 22–29)
CREAT SERPL-MCNC: 0.74 MG/DL (ref 0.76–1.27)
DEPRECATED RDW RBC AUTO: 43 FL (ref 37–54)
EGFRCR SERPLBLD CKD-EPI 2021: 114.6 ML/MIN/1.73
ERYTHROCYTE [DISTWIDTH] IN BLOOD BY AUTOMATED COUNT: 14.1 % (ref 12.3–15.4)
GLOBULIN UR ELPH-MCNC: 1.6 GM/DL
GLUCOSE SERPL-MCNC: 95 MG/DL (ref 65–99)
HCT VFR BLD AUTO: 39.1 % (ref 37.5–51)
HDLC SERPL-MCNC: 33 MG/DL (ref 40–60)
HGB BLD-MCNC: 13.3 G/DL (ref 13–17.7)
LDLC SERPL CALC-MCNC: 82 MG/DL (ref 0–100)
LDLC/HDLC SERPL: 2.44 {RATIO}
LYMPHOCYTES # BLD MANUAL: 2.2 10*3/MM3 (ref 0.7–3.1)
LYMPHOCYTES NFR BLD MANUAL: 2 % (ref 5–12)
MACROCYTES BLD QL SMEAR: ABNORMAL
MCH RBC QN AUTO: 28.5 PG (ref 26.6–33)
MCHC RBC AUTO-ENTMCNC: 34 G/DL (ref 31.5–35.7)
MCV RBC AUTO: 83.9 FL (ref 79–97)
MICROCYTES BLD QL: ABNORMAL
MONOCYTES # BLD: 0.08 10*3/MM3 (ref 0.1–0.9)
NEUTROPHILS # BLD AUTO: 1.71 10*3/MM3 (ref 1.7–7)
NEUTROPHILS NFR BLD MANUAL: 42 % (ref 42.7–76)
PLAT MORPH BLD: NORMAL
PLATELET # BLD AUTO: 152 10*3/MM3 (ref 140–450)
PMV BLD AUTO: 8.7 FL (ref 6–12)
POTASSIUM SERPL-SCNC: 4.2 MMOL/L (ref 3.5–5.2)
PROT SERPL-MCNC: 6.4 G/DL (ref 6–8.5)
RBC # BLD AUTO: 4.66 10*6/MM3 (ref 4.14–5.8)
SODIUM SERPL-SCNC: 140 MMOL/L (ref 136–145)
TRIGL SERPL-MCNC: 103 MG/DL (ref 0–150)
VARIANT LYMPHS NFR BLD MANUAL: 54 % (ref 19.6–45.3)
VLDLC SERPL-MCNC: 19 MG/DL (ref 5–40)
WBC MORPH BLD: NORMAL
WBC NRBC COR # BLD: 4.07 10*3/MM3 (ref 3.4–10.8)

## 2022-05-16 ENCOUNTER — TELEPHONE (OUTPATIENT)
Dept: FAMILY MEDICINE CLINIC | Facility: CLINIC | Age: 42
End: 2022-05-16

## 2022-05-16 NOTE — TELEPHONE ENCOUNTER
Mr. Duranvall called to let us know to disregard the previous message. He is going to be seeing his neurologist.

## 2022-05-16 NOTE — TELEPHONE ENCOUNTER
Pt has some concerns with the breathing issues with his windpipe. He said that he had seen  Dr. Solis and Dr. Shea in the past, says it happened last night again. Wants to know if a rescue inhaler might help. Please have Dr. Greenberg call, thanks!

## 2022-06-23 ENCOUNTER — TELEPHONE (OUTPATIENT)
Dept: CARDIOLOGY | Facility: CLINIC | Age: 42
End: 2022-06-23

## 2022-06-23 NOTE — TELEPHONE ENCOUNTER
Reading Physician Reading Date Result Priority   Alisa Kelesy MD  [phone] 6/22/2022 Routine     Result Text  · Left ventricular ejection fraction appears to be 61 - 65%. Left ventricular systolic function is normal.  · Left ventricular diastolic function is consistent with (grade I) impaired relaxation.  · Estimated right ventricular systolic pressure from tricuspid regurgitation is normal (<35 mmHg).     Patient contacted with echo results.

## 2022-06-29 ENCOUNTER — OFFICE VISIT (OUTPATIENT)
Dept: CARDIOLOGY | Facility: CLINIC | Age: 42
End: 2022-06-29

## 2022-06-29 VITALS
SYSTOLIC BLOOD PRESSURE: 120 MMHG | OXYGEN SATURATION: 98 % | WEIGHT: 228.8 LBS | HEART RATE: 64 BPM | DIASTOLIC BLOOD PRESSURE: 70 MMHG | BODY MASS INDEX: 35.91 KG/M2 | HEIGHT: 67 IN

## 2022-06-29 DIAGNOSIS — I10 HYPERTENSION, ESSENTIAL: Primary | ICD-10-CM

## 2022-06-29 PROCEDURE — 99214 OFFICE O/P EST MOD 30 MIN: CPT | Performed by: INTERNAL MEDICINE

## 2022-06-29 RX ORDER — METRONIDAZOLE 10 MG/G
GEL TOPICAL AS NEEDED
COMMUNITY

## 2022-06-29 RX ORDER — CYCLOBENZAPRINE HCL 5 MG
5 TABLET ORAL 3 TIMES DAILY PRN
COMMUNITY
End: 2022-11-23

## 2022-06-29 NOTE — PROGRESS NOTES
[facility]  OFFICE NOTE    Cardiovascular Medicine  Alisa Kelsey M.D., [facility], [facility] [place], [place], [place]         No referring provider defined for this encounter.    Thank you for asking me to see Nicholas Duane Stovall for RV dilation.    History of Present Illness  This is a 42 y.o. male with:    1. PAH  2.  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  Mitral regurgitation  4. Pericardial Effusion  5.  HEAVEN on CPAP  6.  Extensive history of seizures with a vagal nerve stimulator in place    Nicholas Duane Stovall is a 42 y.o. male who presents for consultation today.  Patient has been previously seen by Dr. Lao, he was labeled as pulmonary arterial hypertension based on echocardiogram findings of RVOT acceleration time abnormality.  He was able with WHO class III.  In the setting of sleep apnea for which he uses CPAP.  His right ventricular cavity is mildly dilated without hypertrophy.  He did not have right heart cath done for further evaluation of his pulmonary hypertension.  He also had mitral regurgitation which was mild in severity.  There is also small pericardial effusion.  He also had a stress test in October 2019 which was low risk, also had a stress echo which was without any evidence of ischemia.    No acute issues since last visit with Dr. Lao.  Has been pretty active at baseline denying any chest pain or shortness of breath.  He mows his lawn without any limitations.      Review of Systems - ROS  Constitution: Negative for weakness, weight gain and weight loss.   HENT: Negative for congestion.    Eyes: Negative for blurred vision.   Cardiovascular: As mentioned above  Respiratory: Negative for cough and hemoptysis.    Endocrine: Negative for polydipsia and polyuria.   Hematologic/Lymphatic: Negative for bleeding problem. Does not bruise/bleed easily.   Skin: Negative for flushing.   Musculoskeletal: Negative for neck pain and stiffness.   Gastrointestinal: Negative for  abdominal pain, diarrhea, jaundice, melena, nausea and vomiting.   Genitourinary: Negative for dysuria and hematuria.   Neurological: Negative for dizziness, focal weakness and numbness.   Psychiatric/Behavioral: Negative for altered mental status and depression.          All other systems were reviewed and were negative.    family history includes Breast cancer in an other family member; COPD in his mother; Cancer in his maternal grandmother and mother; Diabetes in an other family member; Heart disease in an other family member; Hyperlipidemia in his mother; Hypertension in his mother; No Known Problems in his father; Other in his mother; Rheum arthritis in his mother; Stroke in his maternal grandmother and mother.     reports that he has never smoked. He has never used smokeless tobacco. He reports that he does not drink alcohol and does not use drugs.    Allergies   Allergen Reactions   • Abilify [Aripiprazole] Confusion and Hallucinations   • Lexapro [Escitalopram] Other (See Comments)     Other reaction(s): JERKING   • Perphenazine Other (See Comments)     Sleep walking   • Depakote [Valproic Acid] Other (See Comments)     Other reaction(s): Other (See Comments)  Other reaction(s): TREMORS  Other reaction(s): TREMORS   • Mirtazapine Other (See Comments)     Other reaction(s): Other (See Comments)  Other reaction(s): JERKING IN SLEEP  Other reaction(s): JERKING IN SLEEP   • Phenobarbital Confusion and Hallucinations     Hyper, Hallucination         Current Outpatient Medications:   •  amLODIPine (NORVASC) 10 MG tablet, Take 1 tablet by mouth Daily., Disp: 90 tablet, Rfl: 2  •  busPIRone (BUSPAR) 10 MG tablet, Take 10 mg by mouth 2 (Two) Times a Day., Disp: , Rfl:   •  carBAMazepine XR (TEGretol  XR) 200 MG 12 hr tablet, Take 200 mg by mouth 2 (Two) Times a Day. Med Name: Tegretol  mg tablet,extended release; Indication: seizures, Disp: , Rfl:   •  citalopram (CeleXA) 20 MG tablet, Take 40 mg by mouth  "Daily. Indication: depression, Disp: , Rfl:   •  cyclobenzaprine (FLEXERIL) 5 MG tablet, Take 5 mg by mouth 3 (Three) Times a Day As Needed for Muscle Spasms., Disp: , Rfl:   •  levETIRAcetam (KEPPRA) 500 MG tablet, Take  by mouth Daily. 3 tabs AM, 3 tabs PM; Indication: seizures, Disp: , Rfl:   •  metoprolol tartrate (LOPRESSOR) 50 MG tablet, Take 1 tablet by mouth 2 (Two) Times a Day., Disp: 180 tablet, Rfl: 2  •  metroNIDAZOLE (Metrogel) 1 % gel, Apply  topically to the appropriate area as directed As Needed., Disp: , Rfl:   •  Multiple Vitamins-Minerals (MULTIVITAMIN ADULT PO), Take 1 tablet by mouth Daily., Disp: , Rfl:   •  primidone (MYSOLINE) 50 MG tablet, Take 3 tablets by mouth Daily. 3 nightly, Disp: , Rfl:   •  risperiDONE (risperDAL) 4 MG tablet, TAKE 2 MG IN THE MORNING AND 6 MG IN THE EVENING, Disp: , Rfl:     Physical Exam:  Vitals:    06/29/22 0933   BP: 120/70   BP Location: Left arm   Patient Position: Sitting   Cuff Size: Adult   Pulse: 64   SpO2: 98%   Weight: 104 kg (228 lb 12.8 oz)   Height: 170.2 cm (67\")   PainSc: 0-No pain     Current Pain Level: none  Pulse Ox: Normal  on room air  General: alert, appears stated age and cooperative     Body Habitus: well-nourished    HEENT: Head: Normocephalic, no lesions, without obvious abnormality. No arcus senilis, xanthelasma or xanthomas.    Neuro: alert, oriented x3  Pulses: 2+ and symmetric  JVP: Volume/Pulsation: Normal.  Normal waveforms.   Appropriate inspiratory decrease.  No Kussmaul's. No Sim's.   Carotid Exam: no bruit normal pulsation bilaterally   Carotid Volume: normal.     Respirations: no increased work of breathing   Chest:  Normal    Pulmonary:Normal   Precordium: Normal impulses. P2 is not palpable.  RV Heave: absent  LV Heave: absent  Kerrick:  normal size and placement  Palpable S4: absent.  Heart rate: normal    Heart Rhythm: regular     Heart Sounds: S1: normal  S2: normal  S3: absent   S4: absent  Opening Snap: absent "    Pericardial Rub:  Absent: .    Abdomen:   Appearance: normal .  Palpation: Soft, non-tender to palpation, bowel sounds positive in all four quadrants; no guarding or rebound tenderness  Extremity: no edema.   LE Skin: no rashes  LE Hair:  normal  LE Pulses: well perfused with normal pulses in the distal extremities  Pallor on elevation: Absent. Rubor on dependency: None      DATA REVIEWED:     EKG. I personally reviewed and interpreted the EKG.  Sinus rhythm with first-degree AV block, T wave inversions noted in V1 V2 which are unchanged from before.    ECG/EMG Results (all)     Procedure Component Value Units Date/Time    ECG 12 Lead [[id number]] Collected: 06/28/21 0819     Updated: 06/28/21 0822     QT Interval 420 ms      QTC Interval 436 ms     Narrative:      Test Reason : pericardial effusion  Blood Pressure :   */*   mmHG  Vent. Rate :  65 BPM     Atrial Rate :  65 BPM     P-R Int : 250 ms          QRS Dur :  96 ms      QT Int : 420 ms       P-R-T Axes :  45  51  48 degrees     QTc Int : 436 ms    Sinus rhythm with 1st degree AV block  T wave abnormality, consider anterior ischemia  Abnormal ECG  When compared with ECG of 12-JUN-2020 08:52,  No significant change was found    Referred By:            Confirmed By:         ---------------------------------------------------  TTE/NIKO:  Results for orders placed in visit on 06/22/22    Adult Transthoracic Echo Complete W/ Cont if Necessary Per Protocol    Interpretation Summary  · Left ventricular ejection fraction appears to be 61 - 65%. Left ventricular systolic function is normal.  · Left ventricular diastolic function is consistent with (grade I) impaired relaxation.  · Estimated right ventricular systolic pressure from tricuspid regurgitation is normal (<35 mmHg).        ----------------------------------------------------      --------------------------------------------------------------------------------------------------  LABS:     The 10-year CVD  risk score ([place] et al., 2008) is: 6.6%    Values used to calculate the score:      Age: 41 years      Sex: Male      Diabetic: No      Tobacco smoker: No      Systolic Blood Pressure: 122 mmHg      Is BP treated: Yes      HDL Cholesterol: 35 mg/dL      Total Cholesterol: 152 mg/dL         Lab Results   Component Value Date    GLUCOSE 95 04/26/2022    BUN 11 04/26/2022    CREATININE 0.74 (L) 04/26/2022    EGFRIFNONA 88 02/16/2022    BCR 14.9 04/26/2022    K 4.2 04/26/2022    CO2 25.0 04/26/2022    CALCIUM 8.9 04/26/2022    ALBUMIN 4.80 04/26/2022    LABIL2 2.5 09/25/2019    AST 32 04/26/2022    ALT 42 (H) 04/26/2022     Lab Results   Component Value Date    WBC 4.07 04/26/2022    HGB 13.3 04/26/2022    HCT 39.1 04/26/2022    MCV 83.9 04/26/2022     04/26/2022     Lab Results   Component Value Date    CHOL 134 04/26/2022    CHLPL 169 01/13/2017    TRIG 103 04/26/2022    HDL 33 (L) 04/26/2022    LDL 82 04/26/2022     No results found for: TSH, J5RWSIH, A1ZJPJE, THYROIDAB  No results found for: CKTOTAL, CKMB, CKMBINDEX, TROPONINI, TROPONINT  No results found for: HGBA1C  No results found for: DDIMER  Lab Results   Component Value Date    ALT 42 (H) 04/26/2022     No results found for: HGBA1C  Lab Results   Component Value Date    CREATININE 0.74 (L) 04/26/2022     No results found for: IRON, TIBC, FERRITIN  Lab Results   Component Value Date    INR 0.9 02/09/2016    INR 0.9 12/17/2015    PROTIME 11.9 02/09/2016    PROTIME 12.5 12/17/2015       Assessment/Plan     1. Pericardial effusion  Small effusion.  Repeat echocardiogram in June showed trivial pericardial effusion.    2.  Pulmonary arterial hypertension: Previously diagnosed based on echocardiogram.  Was in clinical surveillance.  He has known sleep apnea.  Also has mild obstructive disease confirmed on PFTs from secondary smoking.  Was seen by pulmonary however was told that repeat PFTs are okay.  No history of PE or DVT.  Clinically without any  symptoms.  Repeat echocardiogram showed normal RVSP.  He does have grade 1 diastolic dysfunction.    3.  Hypertension: He is on amlodipine 10 mg and metoprolol 50 mg twice daily          Prevention:  Patient's Body mass index is 35.84 kg/m². indicating that he is obese (BMI >30). Obesity-related health conditions include the following: obstructive sleep apnea. Obesity is newly identified. BMI is is above average; BMI management plan is completed. We discussed low calorie, low carb based diet program, portion control and increasing exercise..      Nicholas Duane Stovall  reports that he has never smoked. He has never used smokeless tobacco..        This document has been electronically signed by Alisa Kelsey MD on June 29, 2022 09:52 CDT

## 2022-07-25 ENCOUNTER — OFFICE VISIT (OUTPATIENT)
Dept: FAMILY MEDICINE CLINIC | Facility: CLINIC | Age: 42
End: 2022-07-25

## 2022-07-25 ENCOUNTER — LAB (OUTPATIENT)
Dept: LAB | Facility: HOSPITAL | Age: 42
End: 2022-07-25

## 2022-07-25 VITALS
SYSTOLIC BLOOD PRESSURE: 120 MMHG | DIASTOLIC BLOOD PRESSURE: 68 MMHG | BODY MASS INDEX: 35.49 KG/M2 | HEART RATE: 76 BPM | OXYGEN SATURATION: 100 % | HEIGHT: 67 IN | WEIGHT: 226.13 LBS

## 2022-07-25 DIAGNOSIS — G40.909 NONINTRACTABLE EPILEPSY WITHOUT STATUS EPILEPTICUS, UNSPECIFIED EPILEPSY TYPE: ICD-10-CM

## 2022-07-25 DIAGNOSIS — I10 ESSENTIAL HYPERTENSION: ICD-10-CM

## 2022-07-25 DIAGNOSIS — I10 ESSENTIAL HYPERTENSION: Primary | ICD-10-CM

## 2022-07-25 LAB
ALBUMIN SERPL-MCNC: 4.6 G/DL (ref 3.5–5.2)
ALBUMIN/GLOB SERPL: 2.9 G/DL
ALP SERPL-CCNC: 97 U/L (ref 39–117)
ALT SERPL W P-5'-P-CCNC: 43 U/L (ref 1–41)
ANION GAP SERPL CALCULATED.3IONS-SCNC: 11 MMOL/L (ref 5–15)
AST SERPL-CCNC: 37 U/L (ref 1–40)
BASOPHILS # BLD AUTO: 0.08 10*3/MM3 (ref 0–0.2)
BASOPHILS NFR BLD AUTO: 1.9 % (ref 0–1.5)
BILIRUB SERPL-MCNC: 0.2 MG/DL (ref 0–1.2)
BUN SERPL-MCNC: 5 MG/DL (ref 6–20)
BUN/CREAT SERPL: 7 (ref 7–25)
CALCIUM SPEC-SCNC: 8.7 MG/DL (ref 8.6–10.5)
CHLORIDE SERPL-SCNC: 100 MMOL/L (ref 98–107)
CHOLEST SERPL-MCNC: 165 MG/DL (ref 0–200)
CO2 SERPL-SCNC: 26 MMOL/L (ref 22–29)
CREAT SERPL-MCNC: 0.71 MG/DL (ref 0.76–1.27)
DEPRECATED RDW RBC AUTO: 38.8 FL (ref 37–54)
EGFRCR SERPLBLD CKD-EPI 2021: 116 ML/MIN/1.73
EOSINOPHIL # BLD AUTO: 0.07 10*3/MM3 (ref 0–0.4)
EOSINOPHIL NFR BLD AUTO: 1.6 % (ref 0.3–6.2)
ERYTHROCYTE [DISTWIDTH] IN BLOOD BY AUTOMATED COUNT: 13.6 % (ref 12.3–15.4)
GLOBULIN UR ELPH-MCNC: 1.6 GM/DL
GLUCOSE SERPL-MCNC: 91 MG/DL (ref 65–99)
HCT VFR BLD AUTO: 36.2 % (ref 37.5–51)
HDLC SERPL-MCNC: 38 MG/DL (ref 40–60)
HGB BLD-MCNC: 12.8 G/DL (ref 13–17.7)
IMM GRANULOCYTES # BLD AUTO: 0.04 10*3/MM3 (ref 0–0.05)
IMM GRANULOCYTES NFR BLD AUTO: 0.9 % (ref 0–0.5)
LDLC SERPL CALC-MCNC: 97 MG/DL (ref 0–100)
LDLC/HDLC SERPL: 2.43 {RATIO}
LYMPHOCYTES # BLD AUTO: 1.42 10*3/MM3 (ref 0.7–3.1)
LYMPHOCYTES NFR BLD AUTO: 33 % (ref 19.6–45.3)
MCH RBC QN AUTO: 28.1 PG (ref 26.6–33)
MCHC RBC AUTO-ENTMCNC: 35.4 G/DL (ref 31.5–35.7)
MCV RBC AUTO: 79.4 FL (ref 79–97)
MONOCYTES # BLD AUTO: 0.41 10*3/MM3 (ref 0.1–0.9)
MONOCYTES NFR BLD AUTO: 9.5 % (ref 5–12)
NEUTROPHILS NFR BLD AUTO: 2.28 10*3/MM3 (ref 1.7–7)
NEUTROPHILS NFR BLD AUTO: 53.1 % (ref 42.7–76)
NRBC BLD AUTO-RTO: 0 /100 WBC (ref 0–0.2)
PLATELET # BLD AUTO: 143 10*3/MM3 (ref 140–450)
PMV BLD AUTO: 8.5 FL (ref 6–12)
POTASSIUM SERPL-SCNC: 4.7 MMOL/L (ref 3.5–5.2)
PROT SERPL-MCNC: 6.2 G/DL (ref 6–8.5)
RBC # BLD AUTO: 4.56 10*6/MM3 (ref 4.14–5.8)
SODIUM SERPL-SCNC: 137 MMOL/L (ref 136–145)
TRIGL SERPL-MCNC: 173 MG/DL (ref 0–150)
VLDLC SERPL-MCNC: 30 MG/DL (ref 5–40)
WBC NRBC COR # BLD: 4.3 10*3/MM3 (ref 3.4–10.8)

## 2022-07-25 PROCEDURE — 80053 COMPREHEN METABOLIC PANEL: CPT

## 2022-07-25 PROCEDURE — 99214 OFFICE O/P EST MOD 30 MIN: CPT | Performed by: FAMILY MEDICINE

## 2022-07-25 PROCEDURE — 36415 COLL VENOUS BLD VENIPUNCTURE: CPT

## 2022-07-25 PROCEDURE — 85025 COMPLETE CBC W/AUTO DIFF WBC: CPT

## 2022-07-25 PROCEDURE — 80061 LIPID PANEL: CPT

## 2022-07-25 RX ORDER — METOPROLOL TARTRATE 50 MG/1
50 TABLET, FILM COATED ORAL 2 TIMES DAILY
Qty: 180 TABLET | Refills: 2 | Status: SHIPPED | OUTPATIENT
Start: 2022-07-25 | End: 2022-10-31 | Stop reason: SDUPTHER

## 2022-07-25 RX ORDER — AMLODIPINE BESYLATE 10 MG/1
10 TABLET ORAL DAILY
Qty: 90 TABLET | Refills: 2 | Status: SHIPPED | OUTPATIENT
Start: 2022-07-25 | End: 2022-10-31 | Stop reason: SDUPTHER

## 2022-07-25 NOTE — PROGRESS NOTES
Subjective   Nicholas Duane Stovall is a 42 y.o. male.   Cc: follow up of chronic medical issues    Hypertension  This is a chronic problem. The current episode started more than 1 year ago. The problem has been gradually improving since onset. Pertinent negatives include no anxiety, blurred vision, chest pain, headaches, malaise/fatigue, neck pain, orthopnea, palpitations, peripheral edema, PND, shortness of breath or sweats. Past treatments include beta blockers. Current antihypertension treatment includes beta blockers.   Seizures   This is a chronic problem. Pertinent negatives include no headaches and no chest pain. Characteristics include rhythmic jerking and loss of consciousness. Characteristics do not include eye blinking, eye deviation, bowel incontinence, bladder incontinence, bit tongue, apnea or cyanosis.       The following portions of the patient's history were reviewed and updated as appropriate: allergies, current medications, past family history, past medical history, past social history, past surgical history and problem list.    Review of Systems   Constitutional: Negative for malaise/fatigue.   Eyes: Negative for blurred vision.   Respiratory: Negative for apnea and shortness of breath.    Cardiovascular: Negative for chest pain, palpitations, orthopnea, PND and cyanosis.   Gastrointestinal: Negative for bowel incontinence.   Genitourinary: Negative for bladder incontinence.   Musculoskeletal: Negative for neck pain.   Neurological: Positive for seizures and loss of consciousness. Negative for headaches.       Objective   Physical Exam  Vitals reviewed.   Constitutional:       Appearance: Normal appearance.   HENT:      Head: Normocephalic and atraumatic.      Right Ear: Tympanic membrane, ear canal and external ear normal.      Left Ear: Tympanic membrane, ear canal and external ear normal.      Mouth/Throat:      Mouth: Mucous membranes are dry.      Pharynx: Oropharynx is clear. No  "oropharyngeal exudate or posterior oropharyngeal erythema.   Cardiovascular:      Rate and Rhythm: Normal rate and regular rhythm.      Heart sounds: Normal heart sounds. No murmur heard.    No friction rub. No gallop.   Pulmonary:      Effort: Pulmonary effort is normal. No respiratory distress.      Breath sounds: Normal breath sounds. No stridor. No wheezing, rhonchi or rales.   Chest:      Chest wall: No tenderness.   Abdominal:      General: Bowel sounds are normal. There is no distension.      Palpations: Abdomen is soft. There is no mass.      Tenderness: There is no abdominal tenderness. There is no guarding or rebound.      Hernia: No hernia is present.   Musculoskeletal:      Cervical back: Normal range of motion.   Skin:     General: Skin is warm and dry.   Neurological:      Mental Status: He is alert.           Visit Vitals  /68   Pulse 76   Ht 170.2 cm (67\")   Wt 103 kg (226 lb 2 oz)   SpO2 100%   BMI 35.42 kg/m²     Body mass index is 35.42 kg/m².      Assessment/Plan   Diagnoses and all orders for this visit:    1. Essential hypertension (Primary)  -     Lipid Panel; Future  -     Comprehensive metabolic panel; Future  -     CBC w AUTO Differential; Future  -     amLODIPine (NORVASC) 10 MG tablet; Take 1 tablet by mouth Daily.  Dispense: 90 tablet; Refill: 2  -     metoprolol tartrate (LOPRESSOR) 50 MG tablet; Take 1 tablet by mouth 2 (Two) Times a Day.  Dispense: 180 tablet; Refill: 2    2. Nonintractable epilepsy without status epilepticus, unspecified epilepsy type (HCC)    Return to the clinic in 3 month/s.  Will contact with results as needed.  I reviewed the CBC and CMP with the patient..    Answers for HPI/ROS submitted by the patient on 7/20/2022  What is the primary reason for your visit?: Other  Please describe your symptoms.: Check up  Have you had these symptoms before?: No  How long have you been having these symptoms?: 1-4 days      "

## 2022-08-24 ENCOUNTER — HOSPITAL ENCOUNTER (EMERGENCY)
Facility: HOSPITAL | Age: 42
End: 2022-08-24

## 2022-08-25 ENCOUNTER — TELEPHONE (OUTPATIENT)
Dept: FAMILY MEDICINE CLINIC | Facility: CLINIC | Age: 42
End: 2022-08-25

## 2022-08-25 NOTE — TELEPHONE ENCOUNTER
Mr. Breen called stating he had a dizzy spell last night. He is concerned this may have been due to dehydration. He would like to speak with Dr. Greenberg or Vianey about this. Please call back @ [phone].

## 2022-10-31 ENCOUNTER — LAB (OUTPATIENT)
Dept: LAB | Facility: HOSPITAL | Age: 42
End: 2022-10-31

## 2022-10-31 ENCOUNTER — OFFICE VISIT (OUTPATIENT)
Dept: FAMILY MEDICINE CLINIC | Facility: CLINIC | Age: 42
End: 2022-10-31

## 2022-10-31 VITALS
HEART RATE: 71 BPM | SYSTOLIC BLOOD PRESSURE: 128 MMHG | BODY MASS INDEX: 34.73 KG/M2 | DIASTOLIC BLOOD PRESSURE: 70 MMHG | OXYGEN SATURATION: 98 % | HEIGHT: 67 IN | WEIGHT: 221.31 LBS

## 2022-10-31 DIAGNOSIS — R56.9 SEIZURE: ICD-10-CM

## 2022-10-31 DIAGNOSIS — E78.01 FAMILIAL HYPERCHOLESTEROLEMIA: ICD-10-CM

## 2022-10-31 DIAGNOSIS — E55.9 VITAMIN D DEFICIENCY: ICD-10-CM

## 2022-10-31 DIAGNOSIS — F20.9 SCHIZOPHRENIA, UNSPECIFIED TYPE: ICD-10-CM

## 2022-10-31 DIAGNOSIS — D50.8 OTHER IRON DEFICIENCY ANEMIA: ICD-10-CM

## 2022-10-31 DIAGNOSIS — I10 ESSENTIAL HYPERTENSION: ICD-10-CM

## 2022-10-31 DIAGNOSIS — I10 ESSENTIAL HYPERTENSION: Primary | ICD-10-CM

## 2022-10-31 DIAGNOSIS — Z11.59 NEED FOR HEPATITIS C SCREENING TEST: ICD-10-CM

## 2022-10-31 DIAGNOSIS — Z23 NEED FOR IMMUNIZATION AGAINST INFLUENZA: ICD-10-CM

## 2022-10-31 LAB
25(OH)D3 SERPL-MCNC: 43.3 NG/ML (ref 30–100)
ALBUMIN SERPL-MCNC: 5 G/DL (ref 3.5–5.2)
ALBUMIN/GLOB SERPL: 3.6 G/DL
ALP SERPL-CCNC: 114 U/L (ref 39–117)
ALT SERPL W P-5'-P-CCNC: 31 U/L (ref 1–41)
ANION GAP SERPL CALCULATED.3IONS-SCNC: 12 MMOL/L (ref 5–15)
AST SERPL-CCNC: 28 U/L (ref 1–40)
BASOPHILS # BLD AUTO: 0.07 10*3/MM3 (ref 0–0.2)
BASOPHILS NFR BLD AUTO: 1.7 % (ref 0–1.5)
BILIRUB SERPL-MCNC: 0.3 MG/DL (ref 0–1.2)
BUN SERPL-MCNC: 5 MG/DL (ref 6–20)
BUN/CREAT SERPL: 6.4 (ref 7–25)
CALCIUM SPEC-SCNC: 9.5 MG/DL (ref 8.6–10.5)
CHLORIDE SERPL-SCNC: 95 MMOL/L (ref 98–107)
CHOLEST SERPL-MCNC: 159 MG/DL (ref 0–200)
CO2 SERPL-SCNC: 28 MMOL/L (ref 22–29)
CREAT SERPL-MCNC: 0.78 MG/DL (ref 0.76–1.27)
DEPRECATED RDW RBC AUTO: 39.5 FL (ref 37–54)
EGFRCR SERPLBLD CKD-EPI 2021: 112.8 ML/MIN/1.73
EOSINOPHIL # BLD AUTO: 0 10*3/MM3 (ref 0–0.4)
EOSINOPHIL NFR BLD AUTO: 0 % (ref 0.3–6.2)
ERYTHROCYTE [DISTWIDTH] IN BLOOD BY AUTOMATED COUNT: 13.7 % (ref 12.3–15.4)
GLOBULIN UR ELPH-MCNC: 1.4 GM/DL
GLUCOSE SERPL-MCNC: 102 MG/DL (ref 65–99)
HCT VFR BLD AUTO: 37.8 % (ref 37.5–51)
HCV AB SER DONR QL: NORMAL
HDLC SERPL-MCNC: 43 MG/DL (ref 40–60)
HGB BLD-MCNC: 13.3 G/DL (ref 13–17.7)
IMM GRANULOCYTES # BLD AUTO: 0.02 10*3/MM3 (ref 0–0.05)
IMM GRANULOCYTES NFR BLD AUTO: 0.5 % (ref 0–0.5)
LDLC SERPL CALC-MCNC: 103 MG/DL (ref 0–100)
LDLC/HDLC SERPL: 2.39 {RATIO}
LYMPHOCYTES # BLD AUTO: 1.18 10*3/MM3 (ref 0.7–3.1)
LYMPHOCYTES NFR BLD AUTO: 29 % (ref 19.6–45.3)
MCH RBC QN AUTO: 28.7 PG (ref 26.6–33)
MCHC RBC AUTO-ENTMCNC: 35.2 G/DL (ref 31.5–35.7)
MCV RBC AUTO: 81.6 FL (ref 79–97)
MONOCYTES # BLD AUTO: 0.52 10*3/MM3 (ref 0.1–0.9)
MONOCYTES NFR BLD AUTO: 12.8 % (ref 5–12)
NEUTROPHILS NFR BLD AUTO: 2.28 10*3/MM3 (ref 1.7–7)
NEUTROPHILS NFR BLD AUTO: 56 % (ref 42.7–76)
NRBC BLD AUTO-RTO: 0 /100 WBC (ref 0–0.2)
PLATELET # BLD AUTO: 144 10*3/MM3 (ref 140–450)
PMV BLD AUTO: 8.4 FL (ref 6–12)
POTASSIUM SERPL-SCNC: 4.6 MMOL/L (ref 3.5–5.2)
PROT SERPL-MCNC: 6.4 G/DL (ref 6–8.5)
RBC # BLD AUTO: 4.63 10*6/MM3 (ref 4.14–5.8)
SODIUM SERPL-SCNC: 135 MMOL/L (ref 136–145)
TRIGL SERPL-MCNC: 67 MG/DL (ref 0–150)
VLDLC SERPL-MCNC: 13 MG/DL (ref 5–40)
WBC NRBC COR # BLD: 4.07 10*3/MM3 (ref 3.4–10.8)

## 2022-10-31 PROCEDURE — G0008 ADMIN INFLUENZA VIRUS VAC: HCPCS | Performed by: FAMILY MEDICINE

## 2022-10-31 PROCEDURE — 80053 COMPREHEN METABOLIC PANEL: CPT

## 2022-10-31 PROCEDURE — 99214 OFFICE O/P EST MOD 30 MIN: CPT | Performed by: FAMILY MEDICINE

## 2022-10-31 PROCEDURE — 80061 LIPID PANEL: CPT

## 2022-10-31 PROCEDURE — 36415 COLL VENOUS BLD VENIPUNCTURE: CPT

## 2022-10-31 PROCEDURE — 82306 VITAMIN D 25 HYDROXY: CPT

## 2022-10-31 PROCEDURE — 90686 IIV4 VACC NO PRSV 0.5 ML IM: CPT | Performed by: FAMILY MEDICINE

## 2022-10-31 PROCEDURE — 86803 HEPATITIS C AB TEST: CPT

## 2022-10-31 PROCEDURE — 85025 COMPLETE CBC W/AUTO DIFF WBC: CPT

## 2022-10-31 RX ORDER — AMLODIPINE BESYLATE 10 MG/1
10 TABLET ORAL DAILY
Qty: 90 TABLET | Refills: 0 | Status: SHIPPED | OUTPATIENT
Start: 2022-10-31 | End: 2023-01-30 | Stop reason: SDUPTHER

## 2022-10-31 RX ORDER — METOPROLOL TARTRATE 50 MG/1
50 TABLET, FILM COATED ORAL 2 TIMES DAILY
Qty: 180 TABLET | Refills: 0 | Status: SHIPPED | OUTPATIENT
Start: 2022-10-31 | End: 2023-01-30 | Stop reason: SDUPTHER

## 2022-10-31 NOTE — PROGRESS NOTES
Subjective   Nicholas Duane Stovall is a 42 y.o. male.   Cc: follow up of chronic medical issues    Hypertension  This is a chronic problem. The current episode started more than 1 year ago. The problem has been gradually improving since onset. Pertinent negatives include no anxiety, blurred vision, chest pain, headaches, malaise/fatigue, neck pain, orthopnea, palpitations, peripheral edema, PND, shortness of breath or sweats. Current antihypertension treatment includes calcium channel blockers and beta blockers.   Seizures   This is a chronic problem. Pertinent negatives include no confusion, no headaches and no chest pain. Characteristics do not include eye blinking, eye deviation, bowel incontinence, bladder incontinence, rhythmic jerking or bit tongue.   Hyperlipidemia  This is a chronic problem. The current episode started more than 1 year ago. The problem is resistant. Pertinent negatives include no chest pain or shortness of breath. Current antihyperlipidemic treatment includes diet change.   Anemia  Presents for follow-up visit. Symptoms include pallor and weight loss. There has been no abdominal pain, anorexia, bruising/bleeding easily, confusion, fever, leg swelling, light-headedness, malaise/fatigue, palpitations, paresthesias or pica.   He has a history of Schizophrenia  And Vitamin D deficiency. They are at their baseline.    The following portions of the patient's history were reviewed and updated as appropriate: allergies, current medications, past family history, past medical history, past social history, past surgical history and problem list.    Review of Systems   Constitutional: Positive for weight loss. Negative for fever and malaise/fatigue.   Eyes: Negative for blurred vision.   Respiratory: Negative for shortness of breath.    Cardiovascular: Negative for chest pain, palpitations, orthopnea and PND.   Gastrointestinal: Negative for abdominal pain, anorexia and bowel incontinence.  "  Genitourinary: Negative for bladder incontinence.   Musculoskeletal: Negative for neck pain.   Skin: Positive for pallor.   Neurological: Positive for seizures. Negative for light-headedness, headaches and paresthesias.   Hematological: Does not bruise/bleed easily.   Psychiatric/Behavioral: Negative for confusion.       Objective   Physical Exam  Vitals reviewed.   Constitutional:       Appearance: Normal appearance.   HENT:      Head: Normocephalic and atraumatic.      Right Ear: Tympanic membrane, ear canal and external ear normal.      Left Ear: Tympanic membrane, ear canal and external ear normal.      Nose: Nose normal.      Mouth/Throat:      Mouth: Mucous membranes are moist.      Pharynx: Oropharynx is clear.   Cardiovascular:      Rate and Rhythm: Normal rate.      Heart sounds: Normal heart sounds. No murmur heard.    No friction rub. No gallop.   Pulmonary:      Effort: Pulmonary effort is normal. No respiratory distress.      Breath sounds: Normal breath sounds. No stridor. No wheezing, rhonchi or rales.   Chest:      Chest wall: No tenderness.   Abdominal:      General: Bowel sounds are normal. There is no distension.      Palpations: Abdomen is soft. There is no mass.      Tenderness: There is no abdominal tenderness. There is no guarding or rebound.      Hernia: No hernia is present.   Musculoskeletal:      Cervical back: Normal range of motion and neck supple.   Skin:     General: Skin is warm and dry.   Neurological:      Mental Status: He is alert.           Visit Vitals  /70   Pulse 71   Ht 170.2 cm (67\")   Wt 100 kg (221 lb 5 oz)   SpO2 98%   BMI 34.66 kg/m²     Body mass index is 34.66 kg/m².      Assessment/Plan   Diagnoses and all orders for this visit:    1. Essential hypertension (Primary)  -     amLODIPine (NORVASC) 10 MG tablet; Take 1 tablet by mouth Daily.  Dispense: 90 tablet; Refill: 0  -     metoprolol tartrate (LOPRESSOR) 50 MG tablet; Take 1 tablet by mouth 2 (Two) Times a " Day.  Dispense: 180 tablet; Refill: 0  -     CBC w AUTO Differential; Future  -     Comprehensive metabolic panel; Future  -     Lipid Panel; Future    2. Familial hypercholesterolemia  -     CBC w AUTO Differential; Future  -     Comprehensive metabolic panel; Future  -     Lipid Panel; Future    3. Other iron deficiency anemia  -     CBC w AUTO Differential; Future    4. Schizophrenia, unspecified type (HCC)    5. Vitamin D deficiency  -     Vitamin D 25 hydroxy; Future    6. Seizure (HCC)    7. Need for hepatitis C screening test  -     Hepatitis C antibody; Future    8. Need for immunization against influenza  -     FluLaval/Fluzone >6 mos ([id number])    I reviewed the CBC,CMP,and Lipid Profile with the patient.  Return to the clinic in 3 month/s.  Will contact with results as needed.          This document has been electronically signed by Calos Greenberg MD on October 31, 2022 09:07 CDT    Answers for HPI/ROS submitted by the patient on 10/25/2022  What is the primary reason for your visit?: Other  Please describe your symptoms.: Check up  Have you had these symptoms before?: Yes  How long have you been having these symptoms?: 1-4 days

## 2022-11-02 ENCOUNTER — IMMUNIZATION (OUTPATIENT)
Dept: FAMILY MEDICINE CLINIC | Facility: CLINIC | Age: 42
End: 2022-11-02

## 2022-11-02 PROCEDURE — 0124A COVID-19 (PFIZER) BIVALENT BOOSTER 12+YRS: CPT | Performed by: SURGERY

## 2022-11-02 PROCEDURE — 91312 COVID-19 (PFIZER) BIVALENT BOOSTER 12+YRS: CPT | Performed by: SURGERY

## 2022-11-23 RX ORDER — CYCLOBENZAPRINE HCL 5 MG
TABLET ORAL
Qty: 90 TABLET | Refills: 0 | Status: SHIPPED | OUTPATIENT
Start: 2022-11-23 | End: 2023-01-30 | Stop reason: SDUPTHER

## 2023-01-25 ENCOUNTER — OFFICE VISIT (OUTPATIENT)
Dept: SLEEP MEDICINE | Facility: HOSPITAL | Age: 43
End: 2023-01-25
Payer: MEDICARE

## 2023-01-25 VITALS
OXYGEN SATURATION: 97 % | WEIGHT: 227 LBS | HEART RATE: 74 BPM | DIASTOLIC BLOOD PRESSURE: 74 MMHG | HEIGHT: 67 IN | BODY MASS INDEX: 35.63 KG/M2 | SYSTOLIC BLOOD PRESSURE: 134 MMHG

## 2023-01-25 DIAGNOSIS — G47.33 OBSTRUCTIVE SLEEP APNEA: Primary | ICD-10-CM

## 2023-01-25 DIAGNOSIS — E66.01 MORBID OBESITY: ICD-10-CM

## 2023-01-25 PROCEDURE — 99213 OFFICE O/P EST LOW 20 MIN: CPT | Performed by: NURSE PRACTITIONER
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
## 2023-01-30 ENCOUNTER — OFFICE VISIT (OUTPATIENT)
Dept: FAMILY MEDICINE CLINIC | Facility: CLINIC | Age: 43
End: 2023-01-30
Payer: MEDICARE

## 2023-01-30 ENCOUNTER — LAB (OUTPATIENT)
Dept: LAB | Facility: HOSPITAL | Age: 43
End: 2023-01-30
Payer: MEDICARE

## 2023-01-30 VITALS
HEIGHT: 67 IN | WEIGHT: 225.31 LBS | OXYGEN SATURATION: 98 % | DIASTOLIC BLOOD PRESSURE: 62 MMHG | BODY MASS INDEX: 35.36 KG/M2 | SYSTOLIC BLOOD PRESSURE: 124 MMHG | HEART RATE: 73 BPM

## 2023-01-30 DIAGNOSIS — I10 ESSENTIAL HYPERTENSION: ICD-10-CM

## 2023-01-30 DIAGNOSIS — I10 ESSENTIAL HYPERTENSION: Primary | ICD-10-CM

## 2023-01-30 DIAGNOSIS — E78.01 FAMILIAL HYPERCHOLESTEROLEMIA: ICD-10-CM

## 2023-01-30 DIAGNOSIS — G40.909 NONINTRACTABLE EPILEPSY WITHOUT STATUS EPILEPTICUS, UNSPECIFIED EPILEPSY TYPE: ICD-10-CM

## 2023-01-30 DIAGNOSIS — F20.9 SCHIZOPHRENIA, UNSPECIFIED TYPE: ICD-10-CM

## 2023-01-30 LAB
ALBUMIN SERPL-MCNC: 4.8 G/DL (ref 3.5–5.2)
ALBUMIN/GLOB SERPL: 3.7 G/DL
ALP SERPL-CCNC: 98 U/L (ref 39–117)
ALT SERPL W P-5'-P-CCNC: 27 U/L (ref 1–41)
ANION GAP SERPL CALCULATED.3IONS-SCNC: 9 MMOL/L (ref 5–15)
AST SERPL-CCNC: 26 U/L (ref 1–40)
BASOPHILS # BLD AUTO: 0.06 10*3/MM3 (ref 0–0.2)
BASOPHILS NFR BLD AUTO: 1.4 % (ref 0–1.5)
BILIRUB SERPL-MCNC: 0.3 MG/DL (ref 0–1.2)
BUN SERPL-MCNC: 6 MG/DL (ref 6–20)
BUN/CREAT SERPL: 7.4 (ref 7–25)
CALCIUM SPEC-SCNC: 9.4 MG/DL (ref 8.6–10.5)
CHLORIDE SERPL-SCNC: 101 MMOL/L (ref 98–107)
CHOLEST SERPL-MCNC: 160 MG/DL (ref 0–200)
CO2 SERPL-SCNC: 27 MMOL/L (ref 22–29)
CREAT SERPL-MCNC: 0.81 MG/DL (ref 0.76–1.27)
DEPRECATED RDW RBC AUTO: 37.1 FL (ref 37–54)
EGFRCR SERPLBLD CKD-EPI 2021: 110.8 ML/MIN/1.73
EOSINOPHIL # BLD AUTO: 0.07 10*3/MM3 (ref 0–0.4)
EOSINOPHIL NFR BLD AUTO: 1.7 % (ref 0.3–6.2)
ERYTHROCYTE [DISTWIDTH] IN BLOOD BY AUTOMATED COUNT: 12.9 % (ref 12.3–15.4)
GLOBULIN UR ELPH-MCNC: 1.3 GM/DL
GLUCOSE SERPL-MCNC: 99 MG/DL (ref 65–99)
HCT VFR BLD AUTO: 38 % (ref 37.5–51)
HDLC SERPL-MCNC: 39 MG/DL (ref 40–60)
HGB BLD-MCNC: 13.5 G/DL (ref 13–17.7)
IMM GRANULOCYTES # BLD AUTO: 0.01 10*3/MM3 (ref 0–0.05)
IMM GRANULOCYTES NFR BLD AUTO: 0.2 % (ref 0–0.5)
LDLC SERPL CALC-MCNC: 97 MG/DL (ref 0–100)
LDLC/HDLC SERPL: 2.43 {RATIO}
LYMPHOCYTES # BLD AUTO: 1.51 10*3/MM3 (ref 0.7–3.1)
LYMPHOCYTES NFR BLD AUTO: 36.2 % (ref 19.6–45.3)
MCH RBC QN AUTO: 28.2 PG (ref 26.6–33)
MCHC RBC AUTO-ENTMCNC: 35.5 G/DL (ref 31.5–35.7)
MCV RBC AUTO: 79.3 FL (ref 79–97)
MONOCYTES # BLD AUTO: 0.45 10*3/MM3 (ref 0.1–0.9)
MONOCYTES NFR BLD AUTO: 10.8 % (ref 5–12)
NEUTROPHILS NFR BLD AUTO: 2.07 10*3/MM3 (ref 1.7–7)
NEUTROPHILS NFR BLD AUTO: 49.7 % (ref 42.7–76)
NRBC BLD AUTO-RTO: 0 /100 WBC (ref 0–0.2)
PLATELET # BLD AUTO: 148 10*3/MM3 (ref 140–450)
PMV BLD AUTO: 8.9 FL (ref 6–12)
POTASSIUM SERPL-SCNC: 4.7 MMOL/L (ref 3.5–5.2)
PROT SERPL-MCNC: 6.1 G/DL (ref 6–8.5)
RBC # BLD AUTO: 4.79 10*6/MM3 (ref 4.14–5.8)
SODIUM SERPL-SCNC: 137 MMOL/L (ref 136–145)
TRIGL SERPL-MCNC: 132 MG/DL (ref 0–150)
VLDLC SERPL-MCNC: 24 MG/DL (ref 5–40)
WBC NRBC COR # BLD: 4.17 10*3/MM3 (ref 3.4–10.8)

## 2023-01-30 PROCEDURE — 36415 COLL VENOUS BLD VENIPUNCTURE: CPT

## 2023-01-30 PROCEDURE — 80061 LIPID PANEL: CPT

## 2023-01-30 PROCEDURE — 85025 COMPLETE CBC W/AUTO DIFF WBC: CPT

## 2023-01-30 PROCEDURE — 99214 OFFICE O/P EST MOD 30 MIN: CPT | Performed by: FAMILY MEDICINE

## 2023-01-30 PROCEDURE — 80053 COMPREHEN METABOLIC PANEL: CPT

## 2023-01-30 RX ORDER — CYCLOBENZAPRINE HCL 5 MG
5 TABLET ORAL 3 TIMES DAILY PRN
Qty: 90 TABLET | Refills: 0 | Status: SHIPPED | OUTPATIENT
Start: 2023-01-30

## 2023-01-30 RX ORDER — METOPROLOL TARTRATE 50 MG/1
50 TABLET, FILM COATED ORAL 2 TIMES DAILY
Qty: 180 TABLET | Refills: 1 | Status: SHIPPED | OUTPATIENT
Start: 2023-01-30

## 2023-01-30 RX ORDER — AMLODIPINE BESYLATE 10 MG/1
10 TABLET ORAL DAILY
Qty: 90 TABLET | Refills: 1 | Status: SHIPPED | OUTPATIENT
Start: 2023-01-30

## 2023-01-30 NOTE — PROGRESS NOTES
Subjective   Nicholas Duane Stovall is a 43 y.o. male.   Cc: follow up of chronic medical issues    Hyperlipidemia  This is a chronic problem. The current episode started more than 1 year ago. The problem is resistant. Pertinent negatives include no chest pain or shortness of breath. Current antihyperlipidemic treatment includes statins.   Hypertension  This is a chronic problem. The current episode started more than 1 year ago. The problem has been gradually improving since onset. Pertinent negatives include no anxiety, blurred vision, chest pain, headaches, malaise/fatigue, neck pain, orthopnea, palpitations, peripheral edema, PND, shortness of breath or sweats. Current antihypertension treatment includes calcium channel blockers and beta blockers.   Seizures   This is a chronic problem. Pertinent negatives include no sleepiness, no confusion, no headaches, no speech difficulty, no visual disturbance, no neck stiffness, no sore throat, no chest pain, no cough, no nausea, no vomiting, no diarrhea and no muscle weakness. Characteristics do not include eye blinking, eye deviation, bowel incontinence, bladder incontinence, rhythmic jerking, loss of consciousness, bit tongue, apnea or cyanosis.       The following portions of the patient's history were reviewed and updated as appropriate: allergies, current medications, past family history, past medical history, past social history, past surgical history and problem list.    Review of Systems   Constitutional: Negative for malaise/fatigue.   HENT: Negative for sore throat.    Eyes: Negative for blurred vision and visual disturbance.   Respiratory: Negative for apnea, cough and shortness of breath.    Cardiovascular: Negative for chest pain, palpitations, orthopnea, PND and cyanosis.   Gastrointestinal: Negative for bowel incontinence, diarrhea, nausea and vomiting.   Genitourinary: Negative for bladder incontinence.   Musculoskeletal: Negative for neck pain.  "  Neurological: Positive for seizures. Negative for loss of consciousness, speech difficulty and headaches.   Psychiatric/Behavioral: Negative for confusion.       Objective   Physical Exam  Vitals reviewed.   Constitutional:       Appearance: Normal appearance.   HENT:      Head: Normocephalic and atraumatic.      Right Ear: Tympanic membrane, ear canal and external ear normal.      Left Ear: Tympanic membrane, ear canal and external ear normal.      Nose: Nose normal.      Mouth/Throat:      Mouth: Mucous membranes are moist.      Pharynx: Oropharynx is clear.   Cardiovascular:      Rate and Rhythm: Normal rate and regular rhythm.      Heart sounds: Normal heart sounds. No murmur heard.    No friction rub. No gallop.   Pulmonary:      Effort: Pulmonary effort is normal. No respiratory distress.      Breath sounds: Normal breath sounds. No stridor. No wheezing, rhonchi or rales.   Chest:      Chest wall: No tenderness.   Abdominal:      General: Bowel sounds are normal. There is no distension.      Palpations: Abdomen is soft. There is no mass.      Tenderness: There is no abdominal tenderness. There is no guarding or rebound.      Hernia: No hernia is present.   Musculoskeletal:      Cervical back: Normal range of motion.   Skin:     General: Skin is warm and dry.   Neurological:      Mental Status: He is alert.           Visit Vitals  /62   Pulse 73   Ht 170.2 cm (67\")   Wt 102 kg (225 lb 5 oz)   SpO2 98%   BMI 35.29 kg/m²     Body mass index is 35.29 kg/m².      Assessment/Plan   Diagnoses and all orders for this visit:    1. Essential hypertension (Primary)  -     amLODIPine (NORVASC) 10 MG tablet; Take 1 tablet by mouth Daily.  Dispense: 90 tablet; Refill: 1  -     metoprolol tartrate (LOPRESSOR) 50 MG tablet; Take 1 tablet by mouth 2 (Two) Times a Day.  Dispense: 180 tablet; Refill: 1  -     Comprehensive metabolic panel; Future  -     CBC w AUTO Differential; Future    2. Familial " hypercholesterolemia  -     Lipid panel; Future    3. Nonintractable epilepsy without status epilepticus, unspecified epilepsy type (HCC)    4. Schizophrenia, unspecified type (HCC)    Other orders  -     cyclobenzaprine (FLEXERIL) 5 MG tablet; Take 1 tablet by mouth 3 (Three) Times a Day As Needed for Muscle Spasms.  Dispense: 90 tablet; Refill: 0    I reviewed the Kepra and Carbamazepine,CBC,CMP,and Lipid Profile with the patient.  Return to the clinic in 3 month/s.  Will contact with results as needed.            This document has been electronically signed by Calos Greenberg MD on January 30, 2023 09:13 CST    Answers for HPI/ROS submitted by the patient on 1/23/2023  What is the primary reason for your visit?: Other  Please describe your symptoms.: Check up  Have you had these symptoms before?: No  How long have you been having these symptoms?: 1-4 days

## 2023-02-27 ENCOUNTER — HOSPITAL ENCOUNTER (EMERGENCY)
Facility: HOSPITAL | Age: 43
Discharge: HOME | End: 2023-02-27
Attending: EMERGENCY MEDICINE | Admitting: EMERGENCY MEDICINE
Payer: MEDICARE

## 2023-02-27 ENCOUNTER — TELEPHONE (OUTPATIENT)
Dept: FAMILY MEDICINE CLINIC | Facility: CLINIC | Age: 43
End: 2023-02-27
Payer: MEDICARE

## 2023-02-27 VITALS
OXYGEN SATURATION: 97 % | DIASTOLIC BLOOD PRESSURE: 93 MMHG | SYSTOLIC BLOOD PRESSURE: 163 MMHG | TEMPERATURE: 98.4 F | HEART RATE: 93 BPM | RESPIRATION RATE: 16 BRPM

## 2023-02-27 DIAGNOSIS — H81.10 BENIGN PAROXYSMAL POSITIONAL VERTIGO, UNSPECIFIED LATERALITY: Primary | ICD-10-CM

## 2023-02-27 LAB
ALBUMIN SERPL-MCNC: 4.9 G/DL (ref 3.5–5.2)
ALBUMIN/GLOB SERPL: 2.7 G/DL
ALP SERPL-CCNC: 107 U/L (ref 39–117)
ALT SERPL W P-5'-P-CCNC: 28 U/L (ref 1–41)
ANION GAP SERPL CALCULATED.3IONS-SCNC: 8 MMOL/L (ref 5–15)
AST SERPL-CCNC: 25 U/L (ref 1–40)
BASOPHILS # BLD AUTO: 0.07 10*3/MM3 (ref 0–0.2)
BASOPHILS NFR BLD AUTO: 1.5 % (ref 0–1.5)
BILIRUB SERPL-MCNC: 0.3 MG/DL (ref 0–1.2)
BUN SERPL-MCNC: 5 MG/DL (ref 6–20)
BUN/CREAT SERPL: 6.9 (ref 7–25)
CALCIUM SPEC-SCNC: 9.3 MG/DL (ref 8.6–10.5)
CARBAMAZEPINE SERPL-MCNC: 6.3 MCG/ML (ref 4–12)
CHLORIDE SERPL-SCNC: 96 MMOL/L (ref 98–107)
CO2 SERPL-SCNC: 32 MMOL/L (ref 22–29)
CREAT SERPL-MCNC: 0.72 MG/DL (ref 0.76–1.27)
DEPRECATED RDW RBC AUTO: 34.7 FL (ref 37–54)
EGFRCR SERPLBLD CKD-EPI 2021: 114.8 ML/MIN/1.73
EOSINOPHIL # BLD AUTO: 0.03 10*3/MM3 (ref 0–0.4)
EOSINOPHIL NFR BLD AUTO: 0.7 % (ref 0.3–6.2)
ERYTHROCYTE [DISTWIDTH] IN BLOOD BY AUTOMATED COUNT: 12.4 % (ref 12.3–15.4)
GLOBULIN UR ELPH-MCNC: 1.8 GM/DL
GLUCOSE SERPL-MCNC: 88 MG/DL (ref 65–99)
HCT VFR BLD AUTO: 37.6 % (ref 37.5–51)
HGB BLD-MCNC: 13.4 G/DL (ref 13–17.7)
HOLD SPECIMEN: NORMAL
IMM GRANULOCYTES # BLD AUTO: 0.01 10*3/MM3 (ref 0–0.05)
IMM GRANULOCYTES NFR BLD AUTO: 0.2 % (ref 0–0.5)
LYMPHOCYTES # BLD AUTO: 1.46 10*3/MM3 (ref 0.7–3.1)
LYMPHOCYTES NFR BLD AUTO: 31.7 % (ref 19.6–45.3)
MCH RBC QN AUTO: 27.9 PG (ref 26.6–33)
MCHC RBC AUTO-ENTMCNC: 35.6 G/DL (ref 31.5–35.7)
MCV RBC AUTO: 78.2 FL (ref 79–97)
MONOCYTES # BLD AUTO: 0.47 10*3/MM3 (ref 0.1–0.9)
MONOCYTES NFR BLD AUTO: 10.2 % (ref 5–12)
NEUTROPHILS NFR BLD AUTO: 2.57 10*3/MM3 (ref 1.7–7)
NEUTROPHILS NFR BLD AUTO: 55.7 % (ref 42.7–76)
NRBC BLD AUTO-RTO: 0 /100 WBC (ref 0–0.2)
PLATELET # BLD AUTO: 114 10*3/MM3 (ref 140–450)
PMV BLD AUTO: 7.6 FL (ref 6–12)
POTASSIUM SERPL-SCNC: 3.9 MMOL/L (ref 3.5–5.2)
PROT SERPL-MCNC: 6.7 G/DL (ref 6–8.5)
QT INTERVAL: 402 MS
QTC INTERVAL: 448 MS
RBC # BLD AUTO: 4.81 10*6/MM3 (ref 4.14–5.8)
SODIUM SERPL-SCNC: 136 MMOL/L (ref 136–145)
WBC NRBC COR # BLD: 4.61 10*3/MM3 (ref 3.4–10.8)
WHOLE BLOOD HOLD COAG: NORMAL

## 2023-02-27 PROCEDURE — 93010 ELECTROCARDIOGRAM REPORT: CPT | Performed by: INTERNAL MEDICINE

## 2023-02-27 PROCEDURE — 80053 COMPREHEN METABOLIC PANEL: CPT | Performed by: EMERGENCY MEDICINE

## 2023-02-27 PROCEDURE — 99283 EMERGENCY DEPT VISIT LOW MDM: CPT

## 2023-02-27 PROCEDURE — 85025 COMPLETE CBC W/AUTO DIFF WBC: CPT | Performed by: EMERGENCY MEDICINE

## 2023-02-27 PROCEDURE — 36415 COLL VENOUS BLD VENIPUNCTURE: CPT

## 2023-02-27 PROCEDURE — 80156 ASSAY CARBAMAZEPINE TOTAL: CPT | Performed by: EMERGENCY MEDICINE

## 2023-02-27 PROCEDURE — 93005 ELECTROCARDIOGRAM TRACING: CPT | Performed by: EMERGENCY MEDICINE

## 2023-02-27 RX ORDER — MECLIZINE HYDROCHLORIDE 25 MG/1
25 TABLET ORAL 3 TIMES DAILY PRN
Qty: 21 TABLET | Refills: 0 | Status: SHIPPED | OUTPATIENT
Start: 2023-02-27

## 2023-02-27 RX ADMIN — SODIUM CHLORIDE 1000 ML: 9 INJECTION, SOLUTION INTRAVENOUS at 17:06

## 2023-03-01 ENCOUNTER — OFFICE VISIT (OUTPATIENT)
Dept: FAMILY MEDICINE CLINIC | Facility: CLINIC | Age: 43
End: 2023-03-01
Payer: MEDICARE

## 2023-03-01 VITALS
HEART RATE: 78 BPM | HEIGHT: 67 IN | OXYGEN SATURATION: 97 % | BODY MASS INDEX: 35.33 KG/M2 | WEIGHT: 225.06 LBS | SYSTOLIC BLOOD PRESSURE: 130 MMHG | DIASTOLIC BLOOD PRESSURE: 76 MMHG

## 2023-03-01 DIAGNOSIS — I10 ESSENTIAL HYPERTENSION: ICD-10-CM

## 2023-03-01 DIAGNOSIS — Z09 FOLLOW-UP VISIT AFTER COMPLETION OF TREATMENT: Primary | ICD-10-CM

## 2023-03-01 DIAGNOSIS — F20.9 SCHIZOPHRENIA, UNSPECIFIED TYPE: ICD-10-CM

## 2023-03-01 DIAGNOSIS — Z12.11 SCREEN FOR COLON CANCER: ICD-10-CM

## 2023-03-01 DIAGNOSIS — G40.909 NONINTRACTABLE EPILEPSY WITHOUT STATUS EPILEPTICUS, UNSPECIFIED EPILEPSY TYPE: ICD-10-CM

## 2023-03-01 PROCEDURE — 99214 OFFICE O/P EST MOD 30 MIN: CPT | Performed by: FAMILY MEDICINE

## 2023-03-01 NOTE — PROGRESS NOTES
Subjective   Nicholas Duane Stovall is a 43 y.o. male.   Cc: follow up of chronic medical issues    Hypertension  This is a chronic problem. The current episode started yesterday. The problem has been gradually improving since onset. Pertinent negatives include no anxiety, blurred vision, chest pain, headaches, malaise/fatigue, neck pain, orthopnea, palpitations, peripheral edema, PND, shortness of breath or sweats. Current antihypertension treatment includes calcium channel blockers.   He has a history of Epilepsy and Schizophrenia. It is at it's base line.  He had an issue with inner ear recently.He was given Meclizine and it has helped.  The following portions of the patient's history were reviewed and updated as appropriate: allergies, current medications, past family history, past medical history, past social history, past surgical history and problem list.    Review of Systems   Constitutional: Negative for malaise/fatigue.   Eyes: Negative for blurred vision.   Respiratory: Negative for shortness of breath.    Cardiovascular: Negative for chest pain, palpitations, orthopnea and PND.   Musculoskeletal: Negative for neck pain.   Neurological: Negative for headaches.       Objective   Physical Exam  Vitals reviewed.   Constitutional:       Appearance: Normal appearance.   HENT:      Head: Normocephalic and atraumatic.      Right Ear: Tympanic membrane, ear canal and external ear normal.      Left Ear: Tympanic membrane, ear canal and external ear normal.      Nose: Nose normal.      Mouth/Throat:      Mouth: Mucous membranes are moist.      Pharynx: Oropharynx is clear.   Cardiovascular:      Rate and Rhythm: Normal rate and regular rhythm.      Heart sounds: Normal heart sounds. No murmur heard.    No friction rub. No gallop.   Pulmonary:      Effort: Pulmonary effort is normal. No respiratory distress.      Breath sounds: Normal breath sounds. No stridor. No wheezing, rhonchi or rales.   Chest:      Chest  "wall: No tenderness.   Abdominal:      General: Bowel sounds are normal. There is no distension.      Palpations: Abdomen is soft. There is no mass.      Tenderness: There is no abdominal tenderness. There is no guarding or rebound.      Hernia: No hernia is present.   Musculoskeletal:      Cervical back: Normal range of motion and neck supple.   Skin:     General: Skin is warm and dry.   Neurological:      Mental Status: He is alert and oriented to person, place, and time.      Cranial Nerves: No cranial nerve deficit.      Sensory: No sensory deficit.      Coordination: Coordination normal.      Comments: Romberg and station and gait were normal.           Visit Vitals  /76   Pulse 78   Ht 170.2 cm (67\")   Wt 102 kg (225 lb 1 oz)   SpO2 97%   BMI 35.25 kg/m²     Body mass index is 35.25 kg/m².      Assessment/Plan   Diagnoses and all orders for this visit:    1. Follow-up visit after completion of treatment (Primary)    2. Essential hypertension    3. Nonintractable epilepsy without status epilepticus, unspecified epilepsy type (HCC)    4. Schizophrenia, unspecified type (HCC)    5. Screen for colon cancer  -     Cologuard - Stool, Per Rectum; Future    I reviewed the TSH,Free T-4,CBC,CMP,and Lipid Profile with the patient.  Return to the clinic in 3 month/s.  Will contact with results as needed.          This document has been electronically signed by Calos Greenberg MD on March 1, 2023 09:43 CST    Answers for HPI/ROS submitted by the patient on 2/28/2023  What is the primary reason for your visit?: Other  Please describe your symptoms.: Was lite headed  Have you had these symptoms before?: No  How long have you been having these symptoms?: 1-4 days  Please list any medications you are currently taking for this condition.: Meclizine  Please describe any probable cause for these symptoms. : Inner ear infection      "

## 2023-05-01 ENCOUNTER — LAB (OUTPATIENT)
Dept: LAB | Facility: HOSPITAL | Age: 43
End: 2023-05-01
Payer: MEDICARE

## 2023-05-01 ENCOUNTER — OFFICE VISIT (OUTPATIENT)
Dept: FAMILY MEDICINE CLINIC | Facility: CLINIC | Age: 43
End: 2023-05-01
Payer: MEDICARE

## 2023-05-01 VITALS
BODY MASS INDEX: 34.54 KG/M2 | HEART RATE: 68 BPM | OXYGEN SATURATION: 98 % | SYSTOLIC BLOOD PRESSURE: 128 MMHG | HEIGHT: 67 IN | DIASTOLIC BLOOD PRESSURE: 74 MMHG | WEIGHT: 220.06 LBS

## 2023-05-01 DIAGNOSIS — I10 ESSENTIAL HYPERTENSION: ICD-10-CM

## 2023-05-01 DIAGNOSIS — F20.9 SCHIZOPHRENIA, UNSPECIFIED TYPE: ICD-10-CM

## 2023-05-01 DIAGNOSIS — I10 ESSENTIAL HYPERTENSION: Primary | ICD-10-CM

## 2023-05-01 DIAGNOSIS — G40.909 NONINTRACTABLE EPILEPSY WITHOUT STATUS EPILEPTICUS, UNSPECIFIED EPILEPSY TYPE: ICD-10-CM

## 2023-05-01 LAB
ALBUMIN SERPL-MCNC: 4.5 G/DL (ref 3.5–5.2)
ALBUMIN/GLOB SERPL: 3 G/DL
ALP SERPL-CCNC: 101 U/L (ref 39–117)
ALT SERPL W P-5'-P-CCNC: 25 U/L (ref 1–41)
ANION GAP SERPL CALCULATED.3IONS-SCNC: 9.2 MMOL/L (ref 5–15)
AST SERPL-CCNC: 22 U/L (ref 1–40)
BASOPHILS # BLD AUTO: 0.05 10*3/MM3 (ref 0–0.2)
BASOPHILS NFR BLD AUTO: 1.3 % (ref 0–1.5)
BILIRUB SERPL-MCNC: 0.3 MG/DL (ref 0–1.2)
BUN SERPL-MCNC: 7 MG/DL (ref 6–20)
BUN/CREAT SERPL: 8.9 (ref 7–25)
CALCIUM SPEC-SCNC: 9 MG/DL (ref 8.6–10.5)
CHLORIDE SERPL-SCNC: 103 MMOL/L (ref 98–107)
CO2 SERPL-SCNC: 27.8 MMOL/L (ref 22–29)
CREAT SERPL-MCNC: 0.79 MG/DL (ref 0.76–1.27)
DEPRECATED RDW RBC AUTO: 41.2 FL (ref 37–54)
EGFRCR SERPLBLD CKD-EPI 2021: 111.6 ML/MIN/1.73
EOSINOPHIL # BLD AUTO: 0.01 10*3/MM3 (ref 0–0.4)
EOSINOPHIL NFR BLD AUTO: 0.3 % (ref 0.3–6.2)
ERYTHROCYTE [DISTWIDTH] IN BLOOD BY AUTOMATED COUNT: 13.8 % (ref 12.3–15.4)
GLOBULIN UR ELPH-MCNC: 1.5 GM/DL
GLUCOSE SERPL-MCNC: 90 MG/DL (ref 65–99)
HCT VFR BLD AUTO: 37.5 % (ref 37.5–51)
HGB BLD-MCNC: 13 G/DL (ref 13–17.7)
IMM GRANULOCYTES # BLD AUTO: 0.02 10*3/MM3 (ref 0–0.05)
IMM GRANULOCYTES NFR BLD AUTO: 0.5 % (ref 0–0.5)
LYMPHOCYTES # BLD AUTO: 1.58 10*3/MM3 (ref 0.7–3.1)
LYMPHOCYTES NFR BLD AUTO: 39.7 % (ref 19.6–45.3)
MCH RBC QN AUTO: 28.8 PG (ref 26.6–33)
MCHC RBC AUTO-ENTMCNC: 34.7 G/DL (ref 31.5–35.7)
MCV RBC AUTO: 83 FL (ref 79–97)
MONOCYTES # BLD AUTO: 0.42 10*3/MM3 (ref 0.1–0.9)
MONOCYTES NFR BLD AUTO: 10.6 % (ref 5–12)
NEUTROPHILS NFR BLD AUTO: 1.9 10*3/MM3 (ref 1.7–7)
NEUTROPHILS NFR BLD AUTO: 47.6 % (ref 42.7–76)
NRBC BLD AUTO-RTO: 0 /100 WBC (ref 0–0.2)
PLATELET # BLD AUTO: 137 10*3/MM3 (ref 140–450)
PMV BLD AUTO: 8.5 FL (ref 6–12)
POTASSIUM SERPL-SCNC: 4.6 MMOL/L (ref 3.5–5.2)
PROT SERPL-MCNC: 6 G/DL (ref 6–8.5)
RBC # BLD AUTO: 4.52 10*6/MM3 (ref 4.14–5.8)
SODIUM SERPL-SCNC: 140 MMOL/L (ref 136–145)
WBC NRBC COR # BLD: 3.98 10*3/MM3 (ref 3.4–10.8)

## 2023-05-01 PROCEDURE — 36415 COLL VENOUS BLD VENIPUNCTURE: CPT

## 2023-05-01 PROCEDURE — 99214 OFFICE O/P EST MOD 30 MIN: CPT | Performed by: FAMILY MEDICINE

## 2023-05-01 PROCEDURE — 80053 COMPREHEN METABOLIC PANEL: CPT

## 2023-05-01 PROCEDURE — 3074F SYST BP LT 130 MM HG: CPT | Performed by: FAMILY MEDICINE

## 2023-05-01 PROCEDURE — 85025 COMPLETE CBC W/AUTO DIFF WBC: CPT

## 2023-05-01 PROCEDURE — 1159F MED LIST DOCD IN RCRD: CPT | Performed by: FAMILY MEDICINE

## 2023-05-01 PROCEDURE — 3078F DIAST BP <80 MM HG: CPT | Performed by: FAMILY MEDICINE

## 2023-05-01 PROCEDURE — 1160F RVW MEDS BY RX/DR IN RCRD: CPT | Performed by: FAMILY MEDICINE

## 2023-05-01 RX ORDER — METOPROLOL TARTRATE 50 MG/1
50 TABLET, FILM COATED ORAL 2 TIMES DAILY
Qty: 180 TABLET | Refills: 1 | Status: CANCELLED | OUTPATIENT
Start: 2023-05-01

## 2023-05-01 RX ORDER — CYCLOBENZAPRINE HCL 5 MG
5 TABLET ORAL 3 TIMES DAILY PRN
Qty: 90 TABLET | Refills: 3 | Status: CANCELLED | OUTPATIENT
Start: 2023-05-01

## 2023-05-01 RX ORDER — AMLODIPINE BESYLATE 10 MG/1
10 TABLET ORAL DAILY
Qty: 90 TABLET | Refills: 1 | Status: CANCELLED | OUTPATIENT
Start: 2023-05-01

## 2023-05-01 NOTE — PROGRESS NOTES
Subjective   Nicholas Duane Stovall is a 43 y.o. male.   Cc: follow up of chronic medical issues    Hypertension  This is a chronic problem. The current episode started more than 1 year ago. The problem has been gradually improving since onset. Pertinent negatives include no anxiety, blurred vision, chest pain, headaches, malaise/fatigue, neck pain, orthopnea, palpitations, peripheral edema, PND, shortness of breath or sweats. Current antihypertension treatment includes calcium channel blockers and beta blockers.   Seizures   This is a chronic problem. The problem has not changed since onset.Pertinent negatives include no headaches, no sore throat, no chest pain, no cough, no nausea and no vomiting. Characteristics include loss of consciousness. Characteristics do not include eye blinking, eye deviation, bladder incontinence, bit tongue or cyanosis.   Schizophrenia  This is a chronic problem. The current episode started more than 1 year ago. The problem occurs daily. Pertinent negatives include no abdominal pain, anorexia, arthralgias, change in bowel habit, chest pain, chills, congestion, coughing, diaphoresis, fatigue, fever, headaches, joint swelling, myalgias, nausea, neck pain, numbness, rash, sore throat, swollen glands, urinary symptoms, vertigo, visual change, vomiting or weakness.       The following portions of the patient's history were reviewed and updated as appropriate: allergies, current medications, past family history, past medical history, past social history, past surgical history and problem list.    Review of Systems   Constitutional: Negative for chills, diaphoresis, fatigue, fever and malaise/fatigue.   HENT: Negative for congestion and sore throat.    Eyes: Negative for blurred vision.   Respiratory: Negative for cough and shortness of breath.    Cardiovascular: Negative for chest pain, palpitations, orthopnea, PND and cyanosis.   Gastrointestinal: Negative for abdominal pain, anorexia,  "change in bowel habit, nausea and vomiting.   Genitourinary: Negative for bladder incontinence.   Musculoskeletal: Negative for arthralgias, joint swelling, myalgias and neck pain.   Skin: Negative for rash.   Neurological: Positive for seizures and loss of consciousness. Negative for vertigo, weakness, numbness and headaches.       Objective   Physical Exam  Vitals reviewed.   Constitutional:       Appearance: Normal appearance.   HENT:      Head: Normocephalic and atraumatic.      Right Ear: Tympanic membrane, ear canal and external ear normal.      Left Ear: Tympanic membrane, ear canal and external ear normal.      Nose: Nose normal.      Mouth/Throat:      Mouth: Mucous membranes are moist.      Pharynx: Oropharynx is clear.   Cardiovascular:      Rate and Rhythm: Normal rate and regular rhythm.      Heart sounds: Normal heart sounds. No murmur heard.    No friction rub. No gallop.   Pulmonary:      Effort: Pulmonary effort is normal. No respiratory distress.      Breath sounds: Normal breath sounds. No stridor. No wheezing, rhonchi or rales.   Chest:      Chest wall: No tenderness.   Abdominal:      General: Bowel sounds are normal. There is no distension.      Palpations: Abdomen is soft. There is no mass.      Tenderness: There is no abdominal tenderness. There is no guarding or rebound.      Hernia: No hernia is present.   Musculoskeletal:      Cervical back: Normal range of motion and neck supple.   Skin:     General: Skin is warm and dry.   Neurological:      Mental Status: He is alert.           Visit Vitals  /74   Pulse 68   Ht 170.2 cm (67\")   Wt 99.8 kg (220 lb 1 oz)   SpO2 98%   BMI 34.47 kg/m²     Body mass index is 34.47 kg/m².      Assessment/Plan   Diagnoses and all orders for this visit:    1. Essential hypertension (Primary)  -     Comprehensive metabolic panel; Future  -     CBC w AUTO Differential; Future    2. Nonintractable epilepsy without status epilepticus, unspecified epilepsy " type    3. Schizophrenia, unspecified type    I reviewed his CMP and CBC 02/23/23 with the patient.  Return to the clinic in 3 month/s.  Will contact with results as needed.  Hypertension,Epilepsy and Schizophrenia are stable.          This document has been electronically signed by Calos Greenberg MD on May 1, 2023 08:37 CDT    Answers for HPI/ROS submitted by the patient on 4/24/2023  What is the primary reason for your visit?: Other  Please describe your symptoms.: Check up  Have you had these symptoms before?: No  How long have you been having these symptoms?: 1-4 days  Please list any medications you are currently taking for this condition.: None  Please describe any probable cause for these symptoms. : Check up

## 2023-05-18 ENCOUNTER — TELEPHONE (OUTPATIENT)
Dept: FAMILY MEDICINE CLINIC | Facility: CLINIC | Age: 43
End: 2023-05-18
Payer: MEDICARE

## 2023-05-18 NOTE — TELEPHONE ENCOUNTER
Irvin was seen on 05/01/23 and wanted to let you know he would be bringing paperwork for his drivers license up to be filled out. Said if he needs an appointment to please call and let him know. The paperwork just says needs to be seen in last 90 days. He will be here on 05/26 to drop it off. Thanks!

## 2023-08-01 ENCOUNTER — OFFICE VISIT (OUTPATIENT)
Dept: FAMILY MEDICINE CLINIC | Facility: CLINIC | Age: 43
End: 2023-08-01
Payer: MEDICARE

## 2023-08-01 ENCOUNTER — LAB (OUTPATIENT)
Dept: LAB | Facility: HOSPITAL | Age: 43
End: 2023-08-01
Payer: MEDICARE

## 2023-08-01 VITALS
HEIGHT: 67 IN | SYSTOLIC BLOOD PRESSURE: 128 MMHG | WEIGHT: 210.06 LBS | HEART RATE: 67 BPM | BODY MASS INDEX: 32.97 KG/M2 | OXYGEN SATURATION: 97 % | DIASTOLIC BLOOD PRESSURE: 70 MMHG

## 2023-08-01 DIAGNOSIS — I10 ESSENTIAL HYPERTENSION: Primary | ICD-10-CM

## 2023-08-01 DIAGNOSIS — I10 ESSENTIAL HYPERTENSION: ICD-10-CM

## 2023-08-01 DIAGNOSIS — M62.830 MUSCLE SPASM OF BACK: ICD-10-CM

## 2023-08-01 DIAGNOSIS — Z86.69 HISTORY OF EPILEPSY: ICD-10-CM

## 2023-08-01 DIAGNOSIS — Z86.59 HISTORY OF SCHIZOPHRENIA: ICD-10-CM

## 2023-08-01 LAB
ALBUMIN SERPL-MCNC: 4.9 G/DL (ref 3.5–5.2)
ALBUMIN/GLOB SERPL: 3.3 G/DL
ALP SERPL-CCNC: 103 U/L (ref 39–117)
ALT SERPL W P-5'-P-CCNC: 37 U/L (ref 1–41)
ANION GAP SERPL CALCULATED.3IONS-SCNC: 9 MMOL/L (ref 5–15)
AST SERPL-CCNC: 32 U/L (ref 1–40)
BILIRUB SERPL-MCNC: 0.4 MG/DL (ref 0–1.2)
BUN SERPL-MCNC: 5 MG/DL (ref 6–20)
BUN/CREAT SERPL: 6.2 (ref 7–25)
CALCIUM SPEC-SCNC: 9.4 MG/DL (ref 8.6–10.5)
CHLORIDE SERPL-SCNC: 102 MMOL/L (ref 98–107)
CHOLEST SERPL-MCNC: 149 MG/DL (ref 0–200)
CO2 SERPL-SCNC: 28 MMOL/L (ref 22–29)
CREAT SERPL-MCNC: 0.81 MG/DL (ref 0.76–1.27)
DEPRECATED RDW RBC AUTO: 38.8 FL (ref 37–54)
EGFRCR SERPLBLD CKD-EPI 2021: 110.8 ML/MIN/1.73
EOSINOPHIL # BLD MANUAL: 0.04 10*3/MM3 (ref 0–0.4)
EOSINOPHIL NFR BLD MANUAL: 1 % (ref 0.3–6.2)
ERYTHROCYTE [DISTWIDTH] IN BLOOD BY AUTOMATED COUNT: 13.4 % (ref 12.3–15.4)
GLOBULIN UR ELPH-MCNC: 1.5 GM/DL
GLUCOSE SERPL-MCNC: 91 MG/DL (ref 65–99)
HCT VFR BLD AUTO: 35.1 % (ref 37.5–51)
HDLC SERPL-MCNC: 40 MG/DL (ref 40–60)
HGB BLD-MCNC: 12.3 G/DL (ref 13–17.7)
LDLC SERPL CALC-MCNC: 92 MG/DL (ref 0–100)
LDLC/HDLC SERPL: 2.27 {RATIO}
LYMPHOCYTES # BLD MANUAL: 1.83 10*3/MM3 (ref 0.7–3.1)
LYMPHOCYTES NFR BLD MANUAL: 5 % (ref 5–12)
MCH RBC QN AUTO: 28.5 PG (ref 26.6–33)
MCHC RBC AUTO-ENTMCNC: 35 G/DL (ref 31.5–35.7)
MCV RBC AUTO: 81.3 FL (ref 79–97)
MONOCYTES # BLD: 0.18 10*3/MM3 (ref 0.1–0.9)
NEUTROPHILS # BLD AUTO: 1.61 10*3/MM3 (ref 1.7–7)
NEUTROPHILS NFR BLD MANUAL: 44 % (ref 42.7–76)
PLAT MORPH BLD: NORMAL
PLATELET # BLD AUTO: 129 10*3/MM3 (ref 140–450)
PMV BLD AUTO: 8.4 FL (ref 6–12)
POTASSIUM SERPL-SCNC: 4.8 MMOL/L (ref 3.5–5.2)
PROT SERPL-MCNC: 6.4 G/DL (ref 6–8.5)
RBC # BLD AUTO: 4.32 10*6/MM3 (ref 4.14–5.8)
RBC MORPH BLD: NORMAL
SODIUM SERPL-SCNC: 139 MMOL/L (ref 136–145)
TRIGL SERPL-MCNC: 91 MG/DL (ref 0–150)
VARIANT LYMPHS NFR BLD MANUAL: 50 % (ref 19.6–45.3)
VLDLC SERPL-MCNC: 17 MG/DL (ref 5–40)
WBC MORPH BLD: NORMAL
WBC NRBC COR # BLD: 3.66 10*3/MM3 (ref 3.4–10.8)

## 2023-08-01 PROCEDURE — 36415 COLL VENOUS BLD VENIPUNCTURE: CPT

## 2023-08-01 PROCEDURE — 1160F RVW MEDS BY RX/DR IN RCRD: CPT | Performed by: FAMILY MEDICINE

## 2023-08-01 PROCEDURE — 99214 OFFICE O/P EST MOD 30 MIN: CPT | Performed by: FAMILY MEDICINE

## 2023-08-01 PROCEDURE — 85027 COMPLETE CBC AUTOMATED: CPT

## 2023-08-01 PROCEDURE — 80061 LIPID PANEL: CPT

## 2023-08-01 PROCEDURE — 3074F SYST BP LT 130 MM HG: CPT | Performed by: FAMILY MEDICINE

## 2023-08-01 PROCEDURE — 80053 COMPREHEN METABOLIC PANEL: CPT

## 2023-08-01 PROCEDURE — 1159F MED LIST DOCD IN RCRD: CPT | Performed by: FAMILY MEDICINE

## 2023-08-01 PROCEDURE — 3078F DIAST BP <80 MM HG: CPT | Performed by: FAMILY MEDICINE

## 2023-08-01 RX ORDER — AMLODIPINE BESYLATE 10 MG/1
10 TABLET ORAL DAILY
Qty: 90 TABLET | Refills: 1 | Status: CANCELLED | OUTPATIENT
Start: 2023-08-01

## 2023-08-01 RX ORDER — CYCLOBENZAPRINE HCL 5 MG
5 TABLET ORAL 3 TIMES DAILY PRN
Qty: 90 TABLET | Refills: 3 | Status: SHIPPED | OUTPATIENT
Start: 2023-08-01

## 2023-08-01 RX ORDER — METOPROLOL TARTRATE 50 MG/1
50 TABLET, FILM COATED ORAL 2 TIMES DAILY
Qty: 180 TABLET | Refills: 1 | Status: CANCELLED | OUTPATIENT
Start: 2023-08-01

## 2023-08-04 ENCOUNTER — TELEPHONE (OUTPATIENT)
Dept: FAMILY MEDICINE CLINIC | Facility: CLINIC | Age: 43
End: 2023-08-04
Payer: MEDICARE

## 2023-08-04 NOTE — TELEPHONE ENCOUNTER
"Pt called and left VM  and said that he needs \"nurse Vianey\" to call him concerning the vaccination history that he spoke with her about.   "

## 2023-08-07 ENCOUNTER — TELEPHONE (OUTPATIENT)
Dept: FAMILY MEDICINE CLINIC | Facility: CLINIC | Age: 43
End: 2023-08-07
Payer: MEDICARE

## 2023-08-07 NOTE — TELEPHONE ENCOUNTER
Irvin called to see if Vianey(Dr Greenberg's nurse) has checked to see if he has had his hepatitis vaccinations. He states that he spoke with her last week about this because he is due to go on a mission trip.       Please call back @ [phone]

## 2023-08-10 ENCOUNTER — OFFICE VISIT (OUTPATIENT)
Dept: CARDIOLOGY | Facility: CLINIC | Age: 43
End: 2023-08-10
Payer: MEDICARE

## 2023-08-10 VITALS
DIASTOLIC BLOOD PRESSURE: 72 MMHG | HEART RATE: 65 BPM | OXYGEN SATURATION: 99 % | SYSTOLIC BLOOD PRESSURE: 138 MMHG | BODY MASS INDEX: 34.31 KG/M2 | HEIGHT: 67 IN | WEIGHT: 218.6 LBS

## 2023-08-10 DIAGNOSIS — I10 HYPERTENSION, ESSENTIAL: Primary | ICD-10-CM

## 2023-08-10 PROCEDURE — 3078F DIAST BP <80 MM HG: CPT | Performed by: INTERNAL MEDICINE

## 2023-08-10 PROCEDURE — 99214 OFFICE O/P EST MOD 30 MIN: CPT | Performed by: INTERNAL MEDICINE

## 2023-08-10 PROCEDURE — 1159F MED LIST DOCD IN RCRD: CPT | Performed by: INTERNAL MEDICINE

## 2023-08-10 PROCEDURE — 3075F SYST BP GE 130 - 139MM HG: CPT | Performed by: INTERNAL MEDICINE

## 2023-08-10 PROCEDURE — 1160F RVW MEDS BY RX/DR IN RCRD: CPT | Performed by: INTERNAL MEDICINE

## 2023-08-10 NOTE — PROGRESS NOTES
[facility]  OFFICE NOTE    Cardiovascular Medicine  Alisa Kelsey M.D., Located within [facility], [facility], [facility]         No referring provider defined for this encounter.    Thank you for asking me to see Nicholas Duane Stovall for RV dilation.    History of Present Illness  This is a 43 y.o. male with:    1. PAH  2.  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  Mitral regurgitation  4. Pericardial Effusion  5.  HEAVEN on CPAP  6.  Extensive history of seizures with a vagal nerve stimulator in place    Nicholas Duane Stovall is a 43 y.o. male who presents for consultation today.  Patient has been previously seen by Dr. Lao, he was labeled as pulmonary arterial hypertension based on echocardiogram findings of RVOT acceleration time abnormality.  He was able with WHO class III.  In the setting of sleep apnea for which he uses CPAP.  His right ventricular cavity is mildly dilated without hypertrophy.  He did not have right heart cath done for further evaluation of his pulmonary hypertension.  He also had mitral regurgitation which was mild in severity.  There is also small pericardial effusion.  He also had a stress test in October 2019 which was low risk, also had a stress echo which was without any evidence of ischemia.    No acute issues since last visit with Dr. Lao.  Has been pretty active at baseline denying any chest pain or shortness of breath.  He mows his lawn without any limitations.    Repeat echocardiogram in June 2022 showed preserved LV systolic function.  Grade 1 diastolic dysfunction.  RVSP was less than 35.    08/10/2023:  Has done well since last visit.  Denying any chest pain.  No shortness of breath.  Has lost about 10 pounds.  Going on admission to [facility] for a week      Review of Systems - ROS  Constitution: Negative for weakness, weight gain and weight loss.   HENT: Negative for congestion.    Eyes: Negative for blurred vision.   Cardiovascular: As mentioned above  Respiratory:  Negative for cough and hemoptysis.    Endocrine: Negative for polydipsia and polyuria.   Hematologic/Lymphatic: Negative for bleeding problem. Does not bruise/bleed easily.   Skin: Negative for flushing.   Musculoskeletal: Negative for neck pain and stiffness.   Gastrointestinal: Negative for abdominal pain, diarrhea, jaundice, melena, nausea and vomiting.   Genitourinary: Negative for dysuria and hematuria.   Neurological: Negative for dizziness, focal weakness and numbness.   Psychiatric/Behavioral: Negative for altered mental status and depression.     I reviewed the ROS as documented here and confirmed the accuracy of it with the patient today. 8/10/2023        All other systems were reviewed and were negative.    family history includes Arthritis in his mother; Breast cancer in an other family member; COPD in his mother; Cancer in his maternal aunt, maternal grandmother, and mother; Diabetes in an other family member; Heart disease in an other family member; Hyperlipidemia in his mother; Hypertension in his mother; No Known Problems in his father; Other in his mother; Rheum arthritis in his mother; Stroke in his maternal grandmother and mother.     reports that he has never smoked. He has never been exposed to tobacco smoke. He has never used smokeless tobacco. He reports that he does not drink alcohol and does not use drugs.    Allergies   Allergen Reactions    Abilify [Aripiprazole] Confusion and Hallucinations    Lexapro [Escitalopram] Other (See Comments)     Other reaction(s): JERKING    Perphenazine Other (See Comments)     Sleep walking    Depakote [Valproic Acid] Other (See Comments)     Other reaction(s): Other (See Comments)  Other reaction(s): TREMORS  Other reaction(s): TREMORS    Mirtazapine Other (See Comments)     Other reaction(s): Other (See Comments)  Other reaction(s): JERKING IN SLEEP  Other reaction(s): JERKING IN SLEEP    Phenobarbital Confusion and Hallucinations     Hyper, Hallucination  "        Current Outpatient Medications:     amLODIPine (NORVASC) 10 MG tablet, Take 1 tablet by mouth Daily., Disp: 90 tablet, Rfl: 1    busPIRone (BUSPAR) 10 MG tablet, Take 1 tablet by mouth 2 (Two) Times a Day., Disp: , Rfl:     carBAMazepine XR (TEGretol  XR) 200 MG 12 hr tablet, Take 1 tablet by mouth 2 (Two) Times a Day. Med Name: Tegretol  mg tablet,extended release; Indication: seizures, Disp: , Rfl:     citalopram (CeleXA) 20 MG tablet, Take 2 tablets by mouth Daily. Indication: depression, Disp: , Rfl:     cyclobenzaprine (FLEXERIL) 5 MG tablet, Take 1 tablet by mouth 3 (Three) Times a Day As Needed for Muscle Spasms., Disp: 90 tablet, Rfl: 3    levETIRAcetam (KEPPRA) 500 MG tablet, Take  by mouth Daily. 3 tabs AM, 3 tabs PM; Indication: seizures, Disp: , Rfl:     metoprolol tartrate (LOPRESSOR) 50 MG tablet, TAKE 1 TABLET BY MOUTH TWICE  DAILY, Disp: 180 tablet, Rfl: 3    Multiple Vitamins-Minerals (MULTIVITAMIN ADULT PO), Take 1 tablet by mouth Daily., Disp: , Rfl:     primidone (MYSOLINE) 50 MG tablet, Take 3 tablets by mouth Every Night. 3 nightly, Disp: , Rfl:     risperiDONE (risperDAL) 4 MG tablet, TAKE 2 MG IN THE MORNING AND 6 MG IN THE EVENING, Disp: , Rfl:     Physical Exam:  Vitals:    08/10/23 0900   BP: 138/72   BP Location: Left arm   Patient Position: Sitting   Cuff Size: Adult   Pulse: 65   SpO2: 99%   Weight: 99.2 kg (218 lb 9.6 oz)   Height: 170.2 cm (67\")   PainSc: 0-No pain   PainLoc: Chest     Current Pain Level: none  Pulse Ox: Normal  on room air  General: alert, appears stated age and cooperative     Body Habitus: well-nourished    HEENT: Head: Normocephalic, no lesions, without obvious abnormality. No arcus senilis, xanthelasma or xanthomas.    Neuro: alert, oriented x3  Pulses: 2+ and symmetric  JVP: Volume/Pulsation: Normal.  Normal waveforms.   Appropriate inspiratory decrease.  No Kussmaul's. No Sim's.   Carotid Exam: no bruit normal pulsation bilaterally   Carotid " Volume: normal.     Respirations: no increased work of breathing   Chest:  Normal    Pulmonary:Normal   Precordium: Normal impulses. P2 is not palpable.  RV Heave: absent  LV Heave: absent  [place]:  normal size and placement  Palpable S4: absent.  Heart rate: normal    Heart Rhythm: regular     Heart Sounds: S1: normal  S2: normal  S3: absent   S4: absent  Opening Snap: absent    Pericardial Rub:  Absent: .    Abdomen:   Appearance: normal .  Palpation: Soft, non-tender to palpation, bowel sounds positive in all four quadrants; no guarding or rebound tenderness  Extremity: no edema.   LE Skin: no rashes  LE Hair:  normal  LE Pulses: well perfused with normal pulses in the distal extremities  Pallor on elevation: Absent. Rubor on dependency: None    I have reexamined the patient and the results are consistent with the previously documented exam. Alisa Kelsey MD       DATA REVIEWED:     EKG. I personally reviewed and interpreted the EKG.  Sinus rhythm with first-degree AV block, T wave inversions noted in V1 V2 which are unchanged from before.    ECG/EMG Results (all)       Procedure Component Value Units Date/Time    ECG 12 Lead [[id number]] Collected: 06/28/21 0819     Updated: 06/28/21 0822     QT Interval 420 ms      QTC Interval 436 ms     Narrative:      Test Reason : pericardial effusion  Blood Pressure :   */*   mmHG  Vent. Rate :  65 BPM     Atrial Rate :  65 BPM     P-R Int : 250 ms          QRS Dur :  96 ms      QT Int : 420 ms       P-R-T Axes :  45  51  48 degrees     QTc Int : 436 ms    Sinus rhythm with 1st degree AV block  T wave abnormality, consider anterior ischemia  Abnormal ECG  When compared with ECG of 12-JUN-2020 08:52,  No significant change was found    Referred By:            Confirmed By:           ---------------------------------------------------  TTE/NIKO:  Results for orders placed in visit on 06/22/22    Adult Transthoracic Echo Complete W/ Cont if Necessary Per  Protocol    Interpretation Summary  ú Left ventricular ejection fraction appears to be 61 - 65%. Left ventricular systolic function is normal.  ú Left ventricular diastolic function is consistent with (grade I) impaired relaxation.  ú Estimated right ventricular systolic pressure from tricuspid regurgitation is normal (<35 mmHg).        ----------------------------------------------------      --------------------------------------------------------------------------------------------------  LABS:     The 10-year CVD risk score ([place] et al., 2008) is: 6.6%    Values used to calculate the score:      Age: 41 years      Sex: Male      Diabetic: No      Tobacco smoker: No      Systolic Blood Pressure: 122 mmHg      Is BP treated: Yes      HDL Cholesterol: 35 mg/dL      Total Cholesterol: 152 mg/dL         Lab Results   Component Value Date    GLUCOSE 91 08/01/2023    BUN 5 (L) 08/01/2023    CREATININE 0.81 08/01/2023    EGFRIFNONA 88 02/16/2022    BCR 6.2 (L) 08/01/2023    K 4.8 08/01/2023    CO2 28.0 08/01/2023    CALCIUM 9.4 08/01/2023    ALBUMIN 4.9 08/01/2023    LABIL2 2.5 09/25/2019    AST 32 08/01/2023    ALT 37 08/01/2023     Lab Results   Component Value Date    WBC 3.66 08/01/2023    HGB 12.3 (L) 08/01/2023    HCT 35.1 (L) 08/01/2023    MCV 81.3 08/01/2023     (L) 08/01/2023     Lab Results   Component Value Date    CHOL 149 08/01/2023    CHLPL 169 01/13/2017    TRIG 91 08/01/2023    HDL 40 08/01/2023    LDL 92 08/01/2023     No results found for: TSH, U0ZINQU, C0JMPDQ, THYROIDAB  No results found for: CKTOTAL, CKMB, CKMBINDEX, TROPONINI, TROPONINT  No results found for: HGBA1C  No results found for: DDIMER  Lab Results   Component Value Date    ALT 37 08/01/2023     No results found for: HGBA1C  Lab Results   Component Value Date    CREATININE 0.81 08/01/2023     No results found for: IRON, TIBC, FERRITIN  Lab Results   Component Value Date    INR 0.9 02/09/2016    INR 0.9 12/17/2015    PROTIME  11.9 02/09/2016    PROTIME 12.5 12/17/2015       Assessment/Plan     1. Pericardial effusion  Small effusion.  Repeat echocardiogram in June showed trivial pericardial effusion.    2.  Pulmonary arterial hypertension: Previously diagnosed based on echocardiogram.  Was in clinical surveillance.  He has known sleep apnea.  Also has mild obstructive disease confirmed on PFTs from secondary smoking.  Was seen by pulmonary however was told that repeat PFTs are okay.  No history of PE or DVT.  Clinically without any symptoms.  Repeat echocardiogram showed normal RVSP.  He does have grade 1 diastolic dysfunction.    3.  Hypertension: He is on amlodipine 10 mg and metoprolol 50 mg twice daily          Prevention:  Patient's Body mass index is 34.24 kg/mý. indicating that he is obese (BMI >30). Obesity-related health conditions include the following: obstructive sleep apnea. Obesity is newly identified. BMI is is above average; BMI management plan is completed. We discussed low calorie, low carb based diet program, portion control and increasing exercise..      Nicholas Duane Nuha  reports that he has never smoked. He has never been exposed to tobacco smoke. He has never used smokeless tobacco..        This document has been electronically signed by Alisa Kelsey MD on August 10, 2023 09:27 CDT